# Patient Record
Sex: MALE | Race: WHITE | NOT HISPANIC OR LATINO | Employment: FULL TIME | URBAN - METROPOLITAN AREA
[De-identification: names, ages, dates, MRNs, and addresses within clinical notes are randomized per-mention and may not be internally consistent; named-entity substitution may affect disease eponyms.]

---

## 2017-12-22 LAB — CRC RECOMMENDATION EXT: NORMAL

## 2019-07-15 ENCOUNTER — OFFICE VISIT (OUTPATIENT)
Dept: URGENT CARE | Facility: CLINIC | Age: 54
End: 2019-07-15
Payer: COMMERCIAL

## 2019-07-15 VITALS
BODY MASS INDEX: 31.83 KG/M2 | WEIGHT: 235 LBS | OXYGEN SATURATION: 97 % | HEIGHT: 72 IN | DIASTOLIC BLOOD PRESSURE: 71 MMHG | HEART RATE: 69 BPM | RESPIRATION RATE: 20 BRPM | SYSTOLIC BLOOD PRESSURE: 119 MMHG | TEMPERATURE: 98 F

## 2019-07-15 DIAGNOSIS — R05.9 COUGH: ICD-10-CM

## 2019-07-15 DIAGNOSIS — R09.81 NASAL SINUS CONGESTION: Primary | ICD-10-CM

## 2019-07-15 PROCEDURE — 99213 OFFICE O/P EST LOW 20 MIN: CPT | Mod: S$GLB,,, | Performed by: NURSE PRACTITIONER

## 2019-07-15 PROCEDURE — 99213 PR OFFICE/OUTPT VISIT, EST, LEVL III, 20-29 MIN: ICD-10-PCS | Mod: S$GLB,,, | Performed by: NURSE PRACTITIONER

## 2019-07-15 RX ORDER — LEVOTHYROXINE SODIUM 200 UG/1
200 CAPSULE ORAL
COMMUNITY

## 2019-07-15 RX ORDER — FLUTICASONE PROPIONATE 50 MCG
1 SPRAY, SUSPENSION (ML) NASAL DAILY
Qty: 1 BOTTLE | Refills: 0 | Status: SHIPPED | OUTPATIENT
Start: 2019-07-15 | End: 2021-05-24

## 2019-07-15 NOTE — PATIENT INSTRUCTIONS
Return to Urgent Care or go to ER if symptoms worsen or fail to improve.  Follow up with PCP as recommended for further management.   Continue Advil cold and sinus medication    Use Nasal Saline to mechanically move any post nasal drip from your eustachian tube or from the back of your throat.    Use Afrin in each nare for no longer than 5 days, as it is addictive. It can also dry out your mucous membranes and cause elevated blood pressure.    Use Flonase 1-2 sprays/nostril per day. It is a local acting steroid nasal spray, if you develop a bloody nose, stop using the medication immediately.     Use warm salt water gargles to ease your throat pain. Warm salt water gargles as needed for sore throat-  1/2 tsp salt to 1 cup warm water, gargle as desired.    Sometimes Nyquil at night is beneficial to help you get some rest, however it is sedating and it does have an antihistamine, and tylenol.

## 2019-07-15 NOTE — PROGRESS NOTES
Subjective:       Patient ID: Walt Lopez is a 53 y.o. male.    Vitals:  height is 6' (1.829 m) and weight is 106.6 kg (235 lb). His oral temperature is 98.3 °F (36.8 °C). His blood pressure is 119/71 and his pulse is 69. His respiration is 20 and oxygen saturation is 97%.     Chief Complaint: Sinus Problem    Sinus Problem   This is a new problem. The current episode started 1 to 4 weeks ago (2 weeks). The problem has been gradually improving since onset. There has been no fever. His pain is at a severity of 0/10. He is experiencing no pain. Associated symptoms include congestion, coughing, headaches, sinus pressure and sneezing. Pertinent negatives include no chills, diaphoresis, ear pain, hoarse voice, neck pain, shortness of breath, sore throat or swollen glands. Past treatments include antibiotics and oral decongestants (Augmentin, NyQuil, Advil Cold & Sinus). The treatment provided mild relief.       Constitution: Positive for fatigue. Negative for chills, sweating and fever.   HENT: Positive for congestion, sinus pain and sinus pressure. Negative for ear pain, sore throat and voice change.    Neck: negative. Negative for neck pain and painful lymph nodes.   Cardiovascular: Negative.    Eyes: Negative for eye redness.   Respiratory: Positive for cough and sputum production. Negative for chest tightness, bloody sputum, COPD, shortness of breath, stridor, wheezing and asthma.    Gastrointestinal: Negative for nausea and vomiting.   Endocrine: negative.   Genitourinary: Negative.    Musculoskeletal: Negative for muscle ache.   Skin: Negative for rash.   Allergic/Immunologic: Positive for sneezing. Negative for seasonal allergies and asthma.   Neurological: Positive for headaches.   Hematologic/Lymphatic: Negative for swollen lymph nodes.   Psychiatric/Behavioral: Negative.        Objective:      Physical Exam   Constitutional: He is oriented to person, place, and time. He appears well-developed and  well-nourished. He is cooperative.  Non-toxic appearance. He does not appear ill. No distress.   HENT:   Head: Normocephalic and atraumatic.   Right Ear: Hearing, tympanic membrane, external ear and ear canal normal.   Left Ear: Hearing, tympanic membrane, external ear and ear canal normal.   Nose: Mucosal edema and rhinorrhea present. No nasal deformity. No epistaxis. Right sinus exhibits no maxillary sinus tenderness and no frontal sinus tenderness. Left sinus exhibits no maxillary sinus tenderness and no frontal sinus tenderness.   Mouth/Throat: Uvula is midline and mucous membranes are normal. No trismus in the jaw. Normal dentition. No uvula swelling. Posterior oropharyngeal erythema present. No oropharyngeal exudate.   Eyes: Conjunctivae and lids are normal. No scleral icterus.   Sclera clear bilat   Neck: Trachea normal, full passive range of motion without pain and phonation normal. Neck supple.   Cardiovascular: Normal rate, regular rhythm, normal heart sounds, intact distal pulses and normal pulses.   Pulmonary/Chest: Effort normal and breath sounds normal. No respiratory distress.   Abdominal: Soft. Normal appearance and bowel sounds are normal. He exhibits no distension. There is no tenderness.   Musculoskeletal: Normal range of motion. He exhibits no edema or deformity.   Lymphadenopathy:        Head (right side): No submental, no submandibular, no tonsillar, no preauricular and no posterior auricular adenopathy present.        Head (left side): No submental, no submandibular, no tonsillar, no preauricular and no posterior auricular adenopathy present.   Neurological: He is alert and oriented to person, place, and time. He exhibits normal muscle tone. Coordination normal.   Skin: Skin is warm, dry and intact. No rash noted. He is not diaphoretic. No pallor.   Psychiatric: He has a normal mood and affect. His speech is normal and behavior is normal. Judgment and thought content normal. Cognition and  memory are normal.   Nursing note and vitals reviewed.      Assessment:       1. Nasal sinus congestion    2. Cough        Plan:         Nasal sinus congestion    Cough    Other orders  -     sodium chloride (OCEAN NASAL) 0.65 % nasal spray; 1 spray by Nasal route as needed for Congestion.  Dispense: 60 mL; Refill: 1  -     fluticasone propionate (FLONASE) 50 mcg/actuation nasal spray; 1 spray (50 mcg total) by Each Nare route once daily.  Dispense: 1 Bottle; Refill: 0

## 2019-07-18 ENCOUNTER — TELEPHONE (OUTPATIENT)
Dept: URGENT CARE | Facility: CLINIC | Age: 54
End: 2019-07-18

## 2020-12-28 PROBLEM — E03.9 HYPOTHYROIDISM: Status: ACTIVE | Noted: 2020-12-28

## 2020-12-28 PROBLEM — I48.91 ATRIAL FIBRILLATION WITH TACHYCARDIC VENTRICULAR RATE: Status: ACTIVE | Noted: 2020-12-28

## 2020-12-29 PROBLEM — I48.3 TYPICAL ATRIAL FLUTTER: Status: ACTIVE | Noted: 2020-12-29

## 2020-12-29 PROBLEM — I48.91 ATRIAL FIBRILLATION WITH TACHYCARDIC VENTRICULAR RATE: Status: RESOLVED | Noted: 2020-12-28 | Resolved: 2020-12-29

## 2020-12-30 ENCOUNTER — PATIENT OUTREACH (OUTPATIENT)
Dept: ADMINISTRATIVE | Facility: CLINIC | Age: 55
End: 2020-12-30

## 2020-12-30 NOTE — PATIENT INSTRUCTIONS
Atrial Flutter    Atrial flutter means that your heart is beating very fast. It is caused by a problem in the electrical pathways of the heart. It can be a sign of heart disease or other health problems that affect your heart.  Palpitations are the most common symptom of atrial flutter. This is the feeling that your heart is fluttering or beating fast or hard. When your heart beats too fast, it doesnt pump blood very well. This can cause other symptoms, including:  · Anxiety  · Fatigue  · Shortness of breath  · Chest pain  · Dizziness  · Fainting  If this is the first time youve had atrial flutter, and you dont have heart or lung disease, it may never happen again. But in most cases, atrial flutter comes and goes. It can last from a few hours to a couple of days. Sometimes the atrial flutter doesnt ever go away. This is chronic atrial flutter.  Atrial flutter may be caused by heart disease. It may also be caused by other conditions that affect the heart:  · Coronary artery disease (arteriosclerosis)  · High blood pressure  · Disease of the heart valves  · Enlarged heart  Atrial flutter can occur without heart disease. This may be because of:  · Overactive thyroid (hyperthyroid)  · Chronic lung disease (COPD, emphysema, or bronchitis)  · Alcohol use  · Drugs or medicines that stimulate the heart. These include cocaine, amphetamines, diet pills, some decongestant cold medicines, caffeine, and nicotine.  · Infection  Treating or removing these causes will make it more likely that treatment for atrial flutter will work. It will also make it less likely that atrial flutter will come back.  Atrial flutter can happen along with another abnormal rhythm called atrial fibrillation. The risk for stroke is higher with these conditions. Getting treatment can reduce your risk.  Home care  Follow these guidelines when caring for yourself at home:  · Go back to your usual activities as soon as you feel back to normal.  · If  you smoke, stop smoking. Talk with your healthcare provider or call a local stop-smoking program for help.  · Dont take drugs or medicines that stimulate your heart. These include cocaine, amphetamines, diet pills, some decongestant cold medicines, caffeine, and nicotine.  · Your provider may have prescribed medicine to stop atrial fibrillation from coming back. Take this medicine exactly as directed. Some medicines must be taken every day to work as they should.  · Your provider may also have prescribed warfarin to lower your risk for stroke. Have your blood tested regularly as advised by your healthcare provider. This will help make sure the dose is right for you.  Follow-up care  Follow up with your healthcare provider, or as advised.  When should I call my healthcare provider?  Call your healthcare provider right away if any of these occur:  · Shortness of breath gets worse  · Swelling in either leg  · Unexpected weight gain  · Chest pain or pressure  · Near fainting or fainting  · You feel like your heart is fluttering, or beating fast or hard  · Fever of 100.4°F (38°C) or higher, or as directed by your healthcare provider  · Cough with dark-colored or bloody mucus  Also call your provider right away if you have signs of stroke:  · Weakness or numbness of an arm or leg or one side of the face  · Difficulty speaking or seeing  · Extreme drowsiness, confusion, dizziness, or fainting   Date Last Reviewed: 5/1/2016  © 0547-0480 [employer]. [address] [place] [id number]. All rights reserved. This information is not intended as a substitute for professional medical care. Always follow your healthcare professional's instructions.

## 2020-12-30 NOTE — PROGRESS NOTES
Please forward this important TCC information to your provider in order to maximize the post discharge care delivery of this patient.    C3 nurse spoke with Walt Lopez for a TCC post hospital discharge follow up call. The patient does not have a scheduled HOSFU appointment with Philip Kay MD within 7-14 days post hospital discharge date 12/29/2020. C3 nurse was unable to schedule HOSFU appointment in Psychiatric.  Please contact pcp and schedule follow up appointment using HOSFU visit type on or before 01/13/2021    Respectfully,  Doreen Ambrosio LPN    [facility]    [email or web address]       Please do not reply to this message, as this inbox is not routinely monitored.

## 2021-02-06 PROBLEM — I48.92 ATRIAL FLUTTER WITH RAPID VENTRICULAR RESPONSE: Status: ACTIVE | Noted: 2021-02-06

## 2021-03-19 ENCOUNTER — IMMUNIZATION (OUTPATIENT)
Dept: PRIMARY CARE CLINIC | Facility: CLINIC | Age: 56
End: 2021-03-19

## 2021-03-19 DIAGNOSIS — Z23 NEED FOR VACCINATION: Primary | ICD-10-CM

## 2021-04-09 ENCOUNTER — IMMUNIZATION (OUTPATIENT)
Dept: PRIMARY CARE CLINIC | Facility: CLINIC | Age: 56
End: 2021-04-09

## 2021-04-09 DIAGNOSIS — Z23 NEED FOR VACCINATION: Primary | ICD-10-CM

## 2021-05-24 ENCOUNTER — OFFICE VISIT (OUTPATIENT)
Dept: URGENT CARE | Facility: CLINIC | Age: 56
End: 2021-05-24
Payer: COMMERCIAL

## 2021-05-24 VITALS
TEMPERATURE: 97 F | DIASTOLIC BLOOD PRESSURE: 70 MMHG | RESPIRATION RATE: 20 BRPM | OXYGEN SATURATION: 95 % | SYSTOLIC BLOOD PRESSURE: 119 MMHG | BODY MASS INDEX: 34.13 KG/M2 | HEART RATE: 71 BPM | WEIGHT: 252 LBS | HEIGHT: 72 IN

## 2021-05-24 DIAGNOSIS — J06.9 UPPER RESPIRATORY INFECTION WITH COUGH AND CONGESTION: Primary | ICD-10-CM

## 2021-05-24 PROCEDURE — 99214 OFFICE O/P EST MOD 30 MIN: CPT | Mod: S$GLB,,, | Performed by: NURSE PRACTITIONER

## 2021-05-24 PROCEDURE — 99214 PR OFFICE/OUTPT VISIT, EST, LEVL IV, 30-39 MIN: ICD-10-PCS | Mod: S$GLB,,, | Performed by: NURSE PRACTITIONER

## 2021-05-24 RX ORDER — METOPROLOL SUCCINATE 50 MG/1
12.5 TABLET, EXTENDED RELEASE ORAL
COMMUNITY
End: 2021-07-01

## 2021-05-24 RX ORDER — AMOXICILLIN AND CLAVULANATE POTASSIUM 875; 125 MG/1; MG/1
1 TABLET, FILM COATED ORAL 2 TIMES DAILY
Qty: 14 TABLET | Refills: 0 | Status: SHIPPED | OUTPATIENT
Start: 2021-05-24 | End: 2021-05-31

## 2021-05-31 ENCOUNTER — OFFICE VISIT (OUTPATIENT)
Dept: URGENT CARE | Facility: CLINIC | Age: 56
End: 2021-05-31
Payer: COMMERCIAL

## 2021-05-31 VITALS
OXYGEN SATURATION: 98 % | TEMPERATURE: 98 F | DIASTOLIC BLOOD PRESSURE: 73 MMHG | HEART RATE: 66 BPM | WEIGHT: 250 LBS | SYSTOLIC BLOOD PRESSURE: 134 MMHG | BODY MASS INDEX: 33.86 KG/M2 | RESPIRATION RATE: 16 BRPM | HEIGHT: 72 IN

## 2021-05-31 DIAGNOSIS — R09.82 PND (POST-NASAL DRIP): Primary | ICD-10-CM

## 2021-05-31 PROCEDURE — 99214 PR OFFICE/OUTPT VISIT, EST, LEVL IV, 30-39 MIN: ICD-10-PCS | Mod: S$GLB,,, | Performed by: FAMILY MEDICINE

## 2021-05-31 PROCEDURE — 99214 OFFICE O/P EST MOD 30 MIN: CPT | Mod: S$GLB,,, | Performed by: FAMILY MEDICINE

## 2021-05-31 RX ORDER — BENZONATATE 200 MG/1
200 CAPSULE ORAL 3 TIMES DAILY PRN
Qty: 30 CAPSULE | Refills: 0 | Status: SHIPPED | OUTPATIENT
Start: 2021-05-31 | End: 2021-07-01

## 2021-05-31 RX ORDER — FLUTICASONE PROPIONATE 50 MCG
1 SPRAY, SUSPENSION (ML) NASAL 2 TIMES DAILY
Qty: 9.9 ML | Refills: 0 | Status: SHIPPED | OUTPATIENT
Start: 2021-05-31 | End: 2021-07-01

## 2021-06-01 ENCOUNTER — OFFICE VISIT (OUTPATIENT)
Dept: OTOLARYNGOLOGY | Facility: CLINIC | Age: 56
End: 2021-06-01
Payer: COMMERCIAL

## 2021-06-01 VITALS
WEIGHT: 258.5 LBS | BODY MASS INDEX: 35.01 KG/M2 | HEIGHT: 72 IN | SYSTOLIC BLOOD PRESSURE: 126 MMHG | DIASTOLIC BLOOD PRESSURE: 78 MMHG | TEMPERATURE: 98 F

## 2021-06-01 DIAGNOSIS — R09.82 PND (POST-NASAL DRIP): ICD-10-CM

## 2021-06-01 DIAGNOSIS — J34.2 NASAL SEPTAL DEVIATION: ICD-10-CM

## 2021-06-01 DIAGNOSIS — J31.0 RHINITIS, UNSPECIFIED TYPE: ICD-10-CM

## 2021-06-01 DIAGNOSIS — J34.89 NASAL HYPERTROPHY: Primary | ICD-10-CM

## 2021-06-01 DIAGNOSIS — J34.89 SINUS PRESSURE: ICD-10-CM

## 2021-06-01 DIAGNOSIS — R09.A2 GLOBUS SENSATION: ICD-10-CM

## 2021-06-01 PROCEDURE — 99203 OFFICE O/P NEW LOW 30 MIN: CPT | Mod: 25,S$GLB,, | Performed by: NURSE PRACTITIONER

## 2021-06-01 PROCEDURE — 31575 DIAGNOSTIC LARYNGOSCOPY: CPT | Mod: S$GLB,,, | Performed by: NURSE PRACTITIONER

## 2021-06-01 PROCEDURE — 31575 LARYNGOSCOPY: ICD-10-PCS | Mod: S$GLB,,, | Performed by: NURSE PRACTITIONER

## 2021-06-01 PROCEDURE — 99203 PR OFFICE/OUTPT VISIT, NEW, LEVL III, 30-44 MIN: ICD-10-PCS | Mod: 25,S$GLB,, | Performed by: NURSE PRACTITIONER

## 2021-06-01 RX ORDER — METHYLPREDNISOLONE 4 MG/1
TABLET ORAL
Qty: 1 PACKAGE | Refills: 0 | Status: SHIPPED | OUTPATIENT
Start: 2021-06-01 | End: 2021-06-22

## 2021-06-11 ENCOUNTER — PATIENT MESSAGE (OUTPATIENT)
Dept: OTOLARYNGOLOGY | Facility: CLINIC | Age: 56
End: 2021-06-11

## 2021-06-11 DIAGNOSIS — J01.90 ACUTE SINUSITIS, RECURRENCE NOT SPECIFIED, UNSPECIFIED LOCATION: Primary | ICD-10-CM

## 2021-06-11 RX ORDER — DOXYCYCLINE HYCLATE 100 MG
100 TABLET ORAL 2 TIMES DAILY
Qty: 20 TABLET | Refills: 0 | Status: SHIPPED | OUTPATIENT
Start: 2021-06-11 | End: 2021-06-21

## 2021-06-14 ENCOUNTER — OFFICE VISIT (OUTPATIENT)
Dept: OTOLARYNGOLOGY | Facility: CLINIC | Age: 56
End: 2021-06-14
Payer: COMMERCIAL

## 2021-06-14 VITALS — SYSTOLIC BLOOD PRESSURE: 135 MMHG | DIASTOLIC BLOOD PRESSURE: 89 MMHG | HEART RATE: 73 BPM

## 2021-06-14 DIAGNOSIS — R68.2 DRY MOUTH: ICD-10-CM

## 2021-06-14 DIAGNOSIS — R09.A2 GLOBUS SENSATION: ICD-10-CM

## 2021-06-14 DIAGNOSIS — R13.10 DYSPHAGIA, UNSPECIFIED TYPE: Primary | ICD-10-CM

## 2021-06-14 DIAGNOSIS — J34.89 SINUS PRESSURE: ICD-10-CM

## 2021-06-14 PROCEDURE — 99203 PR OFFICE/OUTPT VISIT, NEW, LEVL III, 30-44 MIN: ICD-10-PCS | Mod: S$GLB,,, | Performed by: NURSE PRACTITIONER

## 2021-06-14 PROCEDURE — 99999 PR PBB SHADOW E&M-EST. PATIENT-LVL III: ICD-10-PCS | Mod: PBBFAC,,, | Performed by: NURSE PRACTITIONER

## 2021-06-14 PROCEDURE — 99999 PR PBB SHADOW E&M-EST. PATIENT-LVL III: CPT | Mod: PBBFAC,,, | Performed by: NURSE PRACTITIONER

## 2021-06-14 PROCEDURE — 99203 OFFICE O/P NEW LOW 30 MIN: CPT | Mod: S$GLB,,, | Performed by: NURSE PRACTITIONER

## 2021-06-25 ENCOUNTER — PATIENT MESSAGE (OUTPATIENT)
Dept: OTOLARYNGOLOGY | Facility: CLINIC | Age: 56
End: 2021-06-25

## 2021-07-01 ENCOUNTER — OFFICE VISIT (OUTPATIENT)
Dept: OTOLARYNGOLOGY | Facility: CLINIC | Age: 56
End: 2021-07-01
Payer: COMMERCIAL

## 2021-07-01 VITALS — WEIGHT: 257.5 LBS | BODY MASS INDEX: 34.92 KG/M2

## 2021-07-01 DIAGNOSIS — J34.2 DEVIATED NASAL SEPTUM: ICD-10-CM

## 2021-07-01 DIAGNOSIS — H61.23 IMPACTED CERUMEN OF BOTH EARS: ICD-10-CM

## 2021-07-01 DIAGNOSIS — J34.3 NASAL TURBINATE HYPERTROPHY: ICD-10-CM

## 2021-07-01 DIAGNOSIS — J34.89 NASAL CAVITY MASS: Primary | ICD-10-CM

## 2021-07-01 PROCEDURE — 31231 NASAL ENDOSCOPY DX: CPT | Mod: S$GLB,,, | Performed by: OTOLARYNGOLOGY

## 2021-07-01 PROCEDURE — 99215 PR OFFICE/OUTPT VISIT, EST, LEVL V, 40-54 MIN: ICD-10-PCS | Mod: 25,S$GLB,, | Performed by: OTOLARYNGOLOGY

## 2021-07-01 PROCEDURE — 31231 NASAL/SINUS ENDOSCOPY: ICD-10-PCS | Mod: S$GLB,,, | Performed by: OTOLARYNGOLOGY

## 2021-07-01 PROCEDURE — 99215 OFFICE O/P EST HI 40 MIN: CPT | Mod: 25,S$GLB,, | Performed by: OTOLARYNGOLOGY

## 2021-07-01 PROCEDURE — 99999 PR PBB SHADOW E&M-EST. PATIENT-LVL III: ICD-10-PCS | Mod: PBBFAC,,, | Performed by: OTOLARYNGOLOGY

## 2021-07-01 PROCEDURE — 99999 PR PBB SHADOW E&M-EST. PATIENT-LVL III: CPT | Mod: PBBFAC,,, | Performed by: OTOLARYNGOLOGY

## 2021-07-02 ENCOUNTER — PATIENT MESSAGE (OUTPATIENT)
Dept: OTOLARYNGOLOGY | Facility: CLINIC | Age: 56
End: 2021-07-02

## 2021-07-06 ENCOUNTER — HOSPITAL ENCOUNTER (OUTPATIENT)
Dept: RADIOLOGY | Facility: HOSPITAL | Age: 56
Discharge: HOME | End: 2021-07-06
Attending: PHYSICIAN ASSISTANT
Payer: COMMERCIAL

## 2021-07-06 DIAGNOSIS — J34.2 DEVIATED NASAL SEPTUM: ICD-10-CM

## 2021-07-06 DIAGNOSIS — J34.3 NASAL TURBINATE HYPERTROPHY: ICD-10-CM

## 2021-07-06 PROCEDURE — 70486 CT MEDTRONIC SINUSES WITHOUT: ICD-10-PCS | Mod: 26,,, | Performed by: RADIOLOGY

## 2021-07-06 PROCEDURE — 70486 CT MAXILLOFACIAL W/O DYE: CPT | Mod: TC

## 2021-07-06 PROCEDURE — 70486 CT MAXILLOFACIAL W/O DYE: CPT | Mod: 26,,, | Performed by: RADIOLOGY

## 2021-07-07 ENCOUNTER — PATIENT MESSAGE (OUTPATIENT)
Dept: OTOLARYNGOLOGY | Facility: CLINIC | Age: 56
End: 2021-07-07

## 2021-07-07 ENCOUNTER — OFFICE VISIT (OUTPATIENT)
Dept: OTOLARYNGOLOGY | Facility: CLINIC | Age: 56
End: 2021-07-07
Payer: COMMERCIAL

## 2021-07-07 VITALS — BODY MASS INDEX: 34.92 KG/M2 | WEIGHT: 257.5 LBS

## 2021-07-07 DIAGNOSIS — J34.3 NASAL TURBINATE HYPERTROPHY: ICD-10-CM

## 2021-07-07 DIAGNOSIS — J34.89 NASAL CAVITY MASS: Primary | ICD-10-CM

## 2021-07-07 DIAGNOSIS — J34.2 DEVIATED NASAL SEPTUM: ICD-10-CM

## 2021-07-07 PROCEDURE — 99999 PR PBB SHADOW E&M-EST. PATIENT-LVL III: CPT | Mod: PBBFAC,,, | Performed by: OTOLARYNGOLOGY

## 2021-07-07 PROCEDURE — 99999 PR PBB SHADOW E&M-EST. PATIENT-LVL III: ICD-10-PCS | Mod: PBBFAC,,, | Performed by: OTOLARYNGOLOGY

## 2021-07-07 PROCEDURE — 99214 OFFICE O/P EST MOD 30 MIN: CPT | Mod: S$GLB,,, | Performed by: OTOLARYNGOLOGY

## 2021-07-07 PROCEDURE — 99214 PR OFFICE/OUTPT VISIT, EST, LEVL IV, 30-39 MIN: ICD-10-PCS | Mod: S$GLB,,, | Performed by: OTOLARYNGOLOGY

## 2021-07-12 DIAGNOSIS — J34.3 NASAL TURBINATE HYPERTROPHY: ICD-10-CM

## 2021-07-12 DIAGNOSIS — J34.89 SINUS PRESSURE: ICD-10-CM

## 2021-07-12 DIAGNOSIS — J34.89 NASAL CAVITY MASS: Primary | ICD-10-CM

## 2021-07-12 DIAGNOSIS — J01.90 ACUTE SINUSITIS, RECURRENCE NOT SPECIFIED, UNSPECIFIED LOCATION: ICD-10-CM

## 2021-07-12 DIAGNOSIS — J34.2 DEVIATED NASAL SEPTUM: ICD-10-CM

## 2021-07-22 ENCOUNTER — PATIENT MESSAGE (OUTPATIENT)
Dept: OTOLARYNGOLOGY | Facility: CLINIC | Age: 56
End: 2021-07-22

## 2021-07-28 ENCOUNTER — ANESTHESIA EVENT (OUTPATIENT)
Dept: SURGERY | Facility: HOSPITAL | Age: 56
End: 2021-07-28
Payer: COMMERCIAL

## 2021-07-28 ENCOUNTER — TELEPHONE (OUTPATIENT)
Dept: OTOLARYNGOLOGY | Facility: CLINIC | Age: 56
End: 2021-07-28

## 2021-07-28 RX ORDER — ASPIRIN 81 MG/1
81 TABLET ORAL DAILY
COMMUNITY
End: 2021-12-23

## 2021-07-29 ENCOUNTER — HOSPITAL ENCOUNTER (OUTPATIENT)
Facility: HOSPITAL | Age: 56
Discharge: HOME | End: 2021-07-29
Attending: OTOLARYNGOLOGY | Admitting: OTOLARYNGOLOGY
Payer: COMMERCIAL

## 2021-07-29 ENCOUNTER — ANESTHESIA (OUTPATIENT)
Dept: SURGERY | Facility: HOSPITAL | Age: 56
End: 2021-07-29
Payer: COMMERCIAL

## 2021-07-29 VITALS
DIASTOLIC BLOOD PRESSURE: 82 MMHG | TEMPERATURE: 98 F | HEART RATE: 77 BPM | RESPIRATION RATE: 18 BRPM | OXYGEN SATURATION: 96 % | BODY MASS INDEX: 33.18 KG/M2 | SYSTOLIC BLOOD PRESSURE: 148 MMHG | HEIGHT: 72 IN | WEIGHT: 245 LBS

## 2021-07-29 DIAGNOSIS — J34.89 NASAL MASS: Primary | ICD-10-CM

## 2021-07-29 DIAGNOSIS — J34.3 NASAL TURBINATE HYPERTROPHY: ICD-10-CM

## 2021-07-29 DIAGNOSIS — J34.2 DEVIATED NASAL SEPTUM: ICD-10-CM

## 2021-07-29 PROCEDURE — 37000008 HC ANESTHESIA 1ST 15 MINUTES: Performed by: OTOLARYNGOLOGY

## 2021-07-29 PROCEDURE — D9220A PRA ANESTHESIA: ICD-10-PCS | Mod: ANES,,, | Performed by: ANESTHESIOLOGY

## 2021-07-29 PROCEDURE — 88307 TISSUE EXAM BY PATHOLOGIST: CPT | Performed by: PATHOLOGY

## 2021-07-29 PROCEDURE — 27201423 OPTIME MED/SURG SUP & DEVICES STERILE SUPPLY: Performed by: OTOLARYNGOLOGY

## 2021-07-29 PROCEDURE — 36000707: Performed by: OTOLARYNGOLOGY

## 2021-07-29 PROCEDURE — 63600175 PHARM REV CODE 636 W HCPCS: Performed by: NURSE ANESTHETIST, CERTIFIED REGISTERED

## 2021-07-29 PROCEDURE — 25000003 PHARM REV CODE 250: Performed by: OTOLARYNGOLOGY

## 2021-07-29 PROCEDURE — 30117 REMOVAL OF INTRANASAL LESION: CPT | Mod: 59,,, | Performed by: OTOLARYNGOLOGY

## 2021-07-29 PROCEDURE — 30140 RESECT INFERIOR TURBINATE: CPT | Mod: 50,51,, | Performed by: OTOLARYNGOLOGY

## 2021-07-29 PROCEDURE — 30140 PR EXCISION TURBINATE,SUBMUCOUS: ICD-10-PCS | Mod: 50,51,, | Performed by: OTOLARYNGOLOGY

## 2021-07-29 PROCEDURE — 25000003 PHARM REV CODE 250: Performed by: STUDENT IN AN ORGANIZED HEALTH CARE EDUCATION/TRAINING PROGRAM

## 2021-07-29 PROCEDURE — 88307 TISSUE EXAM BY PATHOLOGIST: CPT | Mod: 26,,, | Performed by: PATHOLOGY

## 2021-07-29 PROCEDURE — D9220A PRA ANESTHESIA: ICD-10-PCS | Mod: CRNA,,, | Performed by: NURSE ANESTHETIST, CERTIFIED REGISTERED

## 2021-07-29 PROCEDURE — 25000003 PHARM REV CODE 250: Performed by: NURSE ANESTHETIST, CERTIFIED REGISTERED

## 2021-07-29 PROCEDURE — 94761 N-INVAS EAR/PLS OXIMETRY MLT: CPT

## 2021-07-29 PROCEDURE — 30117 PR EXCIS/DEST INTRANAS LESION; INT APP: ICD-10-PCS | Mod: 59,,, | Performed by: OTOLARYNGOLOGY

## 2021-07-29 PROCEDURE — 30520 PR REPAIR, NASAL SEPTUM: ICD-10-PCS | Mod: ,,, | Performed by: OTOLARYNGOLOGY

## 2021-07-29 PROCEDURE — D9220A PRA ANESTHESIA: Mod: ANES,,, | Performed by: ANESTHESIOLOGY

## 2021-07-29 PROCEDURE — 88307 PR  SURG PATH,LEVEL V: ICD-10-PCS | Mod: 26,,, | Performed by: PATHOLOGY

## 2021-07-29 PROCEDURE — 63600175 PHARM REV CODE 636 W HCPCS: Performed by: STUDENT IN AN ORGANIZED HEALTH CARE EDUCATION/TRAINING PROGRAM

## 2021-07-29 PROCEDURE — 63600175 PHARM REV CODE 636 W HCPCS: Performed by: OTOLARYNGOLOGY

## 2021-07-29 PROCEDURE — 71000033 HC RECOVERY, INTIAL HOUR: Performed by: OTOLARYNGOLOGY

## 2021-07-29 PROCEDURE — D9220A PRA ANESTHESIA: Mod: CRNA,,, | Performed by: NURSE ANESTHETIST, CERTIFIED REGISTERED

## 2021-07-29 PROCEDURE — 36000706: Performed by: OTOLARYNGOLOGY

## 2021-07-29 PROCEDURE — 37000009 HC ANESTHESIA EA ADD 15 MINS: Performed by: OTOLARYNGOLOGY

## 2021-07-29 PROCEDURE — 30520 REPAIR OF NASAL SEPTUM: CPT | Mod: ,,, | Performed by: OTOLARYNGOLOGY

## 2021-07-29 RX ORDER — CEFAZOLIN SODIUM 1 G/3ML
2 INJECTION, POWDER, FOR SOLUTION INTRAMUSCULAR; INTRAVENOUS
Status: COMPLETED | OUTPATIENT
Start: 2021-07-29 | End: 2021-07-29

## 2021-07-29 RX ORDER — ACETAMINOPHEN 500 MG
1000 TABLET ORAL EVERY 6 HOURS PRN
Qty: 30 TABLET | Refills: 1 | Status: SHIPPED | OUTPATIENT
Start: 2021-07-29 | End: 2021-08-10

## 2021-07-29 RX ORDER — FENTANYL CITRATE 50 UG/ML
INJECTION, SOLUTION INTRAMUSCULAR; INTRAVENOUS
Status: DISCONTINUED | OUTPATIENT
Start: 2021-07-29 | End: 2021-07-29

## 2021-07-29 RX ORDER — MIDAZOLAM HYDROCHLORIDE 1 MG/ML
INJECTION, SOLUTION INTRAMUSCULAR; INTRAVENOUS
Status: DISCONTINUED | OUTPATIENT
Start: 2021-07-29 | End: 2021-07-29

## 2021-07-29 RX ORDER — LIDOCAINE HYDROCHLORIDE 10 MG/ML
1 INJECTION, SOLUTION EPIDURAL; INFILTRATION; INTRACAUDAL; PERINEURAL ONCE
Status: COMPLETED | OUTPATIENT
Start: 2021-07-29 | End: 2021-07-29

## 2021-07-29 RX ORDER — LIDOCAINE HYDROCHLORIDE AND EPINEPHRINE 10; 10 MG/ML; UG/ML
INJECTION, SOLUTION INFILTRATION; PERINEURAL
Status: DISCONTINUED | OUTPATIENT
Start: 2021-07-29 | End: 2021-07-29 | Stop reason: HOSPADM

## 2021-07-29 RX ORDER — ONDANSETRON 4 MG/1
8 TABLET, ORALLY DISINTEGRATING ORAL EVERY 8 HOURS PRN
Qty: 10 TABLET | Refills: 0 | Status: SHIPPED | OUTPATIENT
Start: 2021-07-29 | End: 2021-08-10

## 2021-07-29 RX ORDER — ACETAMINOPHEN 10 MG/ML
INJECTION, SOLUTION INTRAVENOUS
Status: DISCONTINUED | OUTPATIENT
Start: 2021-07-29 | End: 2021-07-29

## 2021-07-29 RX ORDER — SODIUM CHLORIDE 0.9 % (FLUSH) 0.9 %
3 SYRINGE (ML) INJECTION
Status: DISCONTINUED | OUTPATIENT
Start: 2021-07-29 | End: 2021-07-29 | Stop reason: HOSPADM

## 2021-07-29 RX ORDER — SODIUM CHLORIDE 0.9 % (FLUSH) 0.9 %
10 SYRINGE (ML) INJECTION
Status: DISCONTINUED | OUTPATIENT
Start: 2021-07-29 | End: 2021-07-29 | Stop reason: HOSPADM

## 2021-07-29 RX ORDER — PROPOFOL 10 MG/ML
VIAL (ML) INTRAVENOUS
Status: DISCONTINUED | OUTPATIENT
Start: 2021-07-29 | End: 2021-07-29

## 2021-07-29 RX ORDER — AMOXICILLIN 875 MG/1
875 TABLET, FILM COATED ORAL EVERY 12 HOURS
Qty: 14 TABLET | Refills: 0 | Status: SHIPPED | OUTPATIENT
Start: 2021-07-29 | End: 2021-08-05

## 2021-07-29 RX ORDER — IBUPROFEN 600 MG/1
600 TABLET ORAL EVERY 6 HOURS PRN
Qty: 30 TABLET | Refills: 1 | Status: SHIPPED | OUTPATIENT
Start: 2021-07-29 | End: 2021-08-10

## 2021-07-29 RX ORDER — DEXAMETHASONE SODIUM PHOSPHATE 4 MG/ML
INJECTION, SOLUTION INTRA-ARTICULAR; INTRALESIONAL; INTRAMUSCULAR; INTRAVENOUS; SOFT TISSUE
Status: DISCONTINUED | OUTPATIENT
Start: 2021-07-29 | End: 2021-07-29

## 2021-07-29 RX ORDER — LIDOCAINE HYDROCHLORIDE 20 MG/ML
INJECTION, SOLUTION EPIDURAL; INFILTRATION; INTRACAUDAL; PERINEURAL
Status: DISCONTINUED | OUTPATIENT
Start: 2021-07-29 | End: 2021-07-29

## 2021-07-29 RX ORDER — REMIFENTANIL HYDROCHLORIDE 1 MG/ML
INJECTION, POWDER, LYOPHILIZED, FOR SOLUTION INTRAVENOUS CONTINUOUS PRN
Status: DISCONTINUED | OUTPATIENT
Start: 2021-07-29 | End: 2021-07-29

## 2021-07-29 RX ORDER — ROCURONIUM BROMIDE 10 MG/ML
INJECTION, SOLUTION INTRAVENOUS
Status: DISCONTINUED | OUTPATIENT
Start: 2021-07-29 | End: 2021-07-29

## 2021-07-29 RX ORDER — EPINEPHRINE CONVENIENCE KIT 1 MG/ML(1)
KIT INTRAMUSCULAR; SUBCUTANEOUS
Status: DISCONTINUED | OUTPATIENT
Start: 2021-07-29 | End: 2021-07-29 | Stop reason: HOSPADM

## 2021-07-29 RX ORDER — ONDANSETRON 2 MG/ML
INJECTION INTRAMUSCULAR; INTRAVENOUS
Status: DISCONTINUED | OUTPATIENT
Start: 2021-07-29 | End: 2021-07-29

## 2021-07-29 RX ADMIN — FENTANYL CITRATE 100 MCG: 50 INJECTION INTRAMUSCULAR; INTRAVENOUS at 04:07

## 2021-07-29 RX ADMIN — PROPOFOL 200 MG: 10 INJECTION, EMULSION INTRAVENOUS at 04:07

## 2021-07-29 RX ADMIN — ONDANSETRON 4 MG: 2 INJECTION INTRAMUSCULAR; INTRAVENOUS at 06:07

## 2021-07-29 RX ADMIN — CEFAZOLIN 2 G: 330 INJECTION, POWDER, FOR SOLUTION INTRAMUSCULAR; INTRAVENOUS at 04:07

## 2021-07-29 RX ADMIN — REMIFENTANIL HYDROCHLORIDE 0.1 MCG/KG/MIN: 1 INJECTION, POWDER, LYOPHILIZED, FOR SOLUTION INTRAVENOUS at 04:07

## 2021-07-29 RX ADMIN — PROPOFOL 150 MCG/KG/MIN: 10 INJECTION, EMULSION INTRAVENOUS at 04:07

## 2021-07-29 RX ADMIN — DEXAMETHASONE SODIUM PHOSPHATE 4 MG: 4 INJECTION INTRA-ARTICULAR; INTRALESIONAL; INTRAMUSCULAR; INTRAVENOUS; SOFT TISSUE at 04:07

## 2021-07-29 RX ADMIN — LIDOCAINE HYDROCHLORIDE 100 MG: 20 INJECTION, SOLUTION EPIDURAL; INFILTRATION; INTRACAUDAL at 04:07

## 2021-07-29 RX ADMIN — MIDAZOLAM 2 MG: 1 INJECTION INTRAMUSCULAR; INTRAVENOUS at 04:07

## 2021-07-29 RX ADMIN — DEXAMETHASONE SODIUM PHOSPHATE 100 MG: 4 INJECTION INTRA-ARTICULAR; INTRALESIONAL; INTRAMUSCULAR; INTRAVENOUS; SOFT TISSUE at 04:07

## 2021-07-29 RX ADMIN — ACETAMINOPHEN 1000 MG: 10 INJECTION INTRAVENOUS at 05:07

## 2021-07-29 RX ADMIN — SODIUM CHLORIDE: 0.9 INJECTION, SOLUTION INTRAVENOUS at 03:07

## 2021-07-29 RX ADMIN — ROCURONIUM BROMIDE 30 MG: 10 INJECTION INTRAVENOUS at 04:07

## 2021-07-30 ENCOUNTER — PATIENT MESSAGE (OUTPATIENT)
Dept: OTOLARYNGOLOGY | Facility: CLINIC | Age: 56
End: 2021-07-30

## 2021-08-03 LAB
FINAL PATHOLOGIC DIAGNOSIS: NORMAL
GROSS: NORMAL
Lab: NORMAL

## 2021-08-10 ENCOUNTER — OFFICE VISIT (OUTPATIENT)
Dept: OTOLARYNGOLOGY | Facility: CLINIC | Age: 56
End: 2021-08-10
Payer: COMMERCIAL

## 2021-08-10 VITALS — BODY MASS INDEX: 34.04 KG/M2 | HEIGHT: 72 IN | WEIGHT: 251.31 LBS

## 2021-08-10 DIAGNOSIS — J34.3 NASAL TURBINATE HYPERTROPHY: ICD-10-CM

## 2021-08-10 DIAGNOSIS — J34.2 DEVIATED NASAL SEPTUM: Primary | ICD-10-CM

## 2021-08-10 DIAGNOSIS — D23.39 PAPILLOMA OF NOSE: ICD-10-CM

## 2021-08-10 PROCEDURE — 99999 PR PBB SHADOW E&M-EST. PATIENT-LVL III: CPT | Mod: PBBFAC,,, | Performed by: OTOLARYNGOLOGY

## 2021-08-10 PROCEDURE — 99024 POSTOP FOLLOW-UP VISIT: CPT | Mod: S$GLB,,, | Performed by: OTOLARYNGOLOGY

## 2021-08-10 PROCEDURE — 99024 PR POST-OP FOLLOW-UP VISIT: ICD-10-PCS | Mod: S$GLB,,, | Performed by: OTOLARYNGOLOGY

## 2021-08-10 PROCEDURE — 99999 PR PBB SHADOW E&M-EST. PATIENT-LVL III: ICD-10-PCS | Mod: PBBFAC,,, | Performed by: OTOLARYNGOLOGY

## 2021-08-10 PROCEDURE — 31231 NASAL ENDOSCOPY DX: CPT | Mod: 79,S$GLB,, | Performed by: OTOLARYNGOLOGY

## 2021-08-10 PROCEDURE — 31231 NASAL/SINUS ENDOSCOPY: ICD-10-PCS | Mod: 79,S$GLB,, | Performed by: OTOLARYNGOLOGY

## 2021-09-15 ENCOUNTER — OFFICE VISIT (OUTPATIENT)
Dept: OTOLARYNGOLOGY | Facility: CLINIC | Age: 56
End: 2021-09-15
Payer: COMMERCIAL

## 2021-09-15 VITALS — WEIGHT: 248.25 LBS | HEIGHT: 72 IN | BODY MASS INDEX: 33.62 KG/M2

## 2021-09-15 DIAGNOSIS — J34.2 DEVIATED NASAL SEPTUM: ICD-10-CM

## 2021-09-15 DIAGNOSIS — D23.39 PAPILLOMA OF NOSE: Primary | ICD-10-CM

## 2021-09-15 DIAGNOSIS — J34.3 NASAL TURBINATE HYPERTROPHY: ICD-10-CM

## 2021-09-15 PROCEDURE — 99999 PR PBB SHADOW E&M-EST. PATIENT-LVL III: CPT | Mod: PBBFAC,,, | Performed by: OTOLARYNGOLOGY

## 2021-09-15 PROCEDURE — 31231 NASAL ENDOSCOPY DX: CPT | Mod: 79,S$GLB,, | Performed by: OTOLARYNGOLOGY

## 2021-09-15 PROCEDURE — 99024 POSTOP FOLLOW-UP VISIT: CPT | Mod: S$GLB,,, | Performed by: OTOLARYNGOLOGY

## 2021-09-15 PROCEDURE — 31231 NASAL/SINUS ENDOSCOPY: ICD-10-PCS | Mod: 79,S$GLB,, | Performed by: OTOLARYNGOLOGY

## 2021-09-15 PROCEDURE — 99024 PR POST-OP FOLLOW-UP VISIT: ICD-10-PCS | Mod: S$GLB,,, | Performed by: OTOLARYNGOLOGY

## 2021-09-15 PROCEDURE — 99999 PR PBB SHADOW E&M-EST. PATIENT-LVL III: ICD-10-PCS | Mod: PBBFAC,,, | Performed by: OTOLARYNGOLOGY

## 2021-10-18 ENCOUNTER — PATIENT MESSAGE (OUTPATIENT)
Dept: GASTROENTEROLOGY | Facility: CLINIC | Age: 56
End: 2021-10-18

## 2021-10-18 ENCOUNTER — OFFICE VISIT (OUTPATIENT)
Dept: GASTROENTEROLOGY | Facility: CLINIC | Age: 56
End: 2021-10-18
Payer: COMMERCIAL

## 2021-10-18 VITALS
WEIGHT: 249.81 LBS | RESPIRATION RATE: 18 BRPM | HEART RATE: 80 BPM | DIASTOLIC BLOOD PRESSURE: 78 MMHG | HEIGHT: 72 IN | BODY MASS INDEX: 33.83 KG/M2 | OXYGEN SATURATION: 98 % | SYSTOLIC BLOOD PRESSURE: 120 MMHG

## 2021-10-18 DIAGNOSIS — R09.A2 GLOBUS SENSATION: ICD-10-CM

## 2021-10-18 DIAGNOSIS — R13.19 ESOPHAGEAL DYSPHAGIA: Primary | ICD-10-CM

## 2021-10-18 PROBLEM — I48.92 ATRIAL FLUTTER WITH RAPID VENTRICULAR RESPONSE: Status: RESOLVED | Noted: 2021-02-06 | Resolved: 2021-10-18

## 2021-10-18 PROBLEM — I48.3 TYPICAL ATRIAL FLUTTER: Status: RESOLVED | Noted: 2020-12-29 | Resolved: 2021-10-18

## 2021-10-18 PROBLEM — Z98.890 S/P ABLATION OF ATRIAL FLUTTER: Status: ACTIVE | Noted: 2021-10-18

## 2021-10-18 PROBLEM — Z86.79 S/P ABLATION OF ATRIAL FLUTTER: Status: ACTIVE | Noted: 2021-10-18

## 2021-10-18 PROCEDURE — 99999 PR PBB SHADOW E&M-EST. PATIENT-LVL V: CPT | Mod: PBBFAC,,, | Performed by: NURSE PRACTITIONER

## 2021-10-18 PROCEDURE — 99203 PR OFFICE/OUTPT VISIT, NEW, LEVL III, 30-44 MIN: ICD-10-PCS | Mod: S$GLB,,, | Performed by: NURSE PRACTITIONER

## 2021-10-18 PROCEDURE — 99999 PR PBB SHADOW E&M-EST. PATIENT-LVL V: ICD-10-PCS | Mod: PBBFAC,,, | Performed by: NURSE PRACTITIONER

## 2021-10-18 PROCEDURE — 99203 OFFICE O/P NEW LOW 30 MIN: CPT | Mod: S$GLB,,, | Performed by: NURSE PRACTITIONER

## 2021-10-28 ENCOUNTER — TELEPHONE (OUTPATIENT)
Dept: OTOLARYNGOLOGY | Facility: CLINIC | Age: 56
End: 2021-10-28
Payer: COMMERCIAL

## 2021-11-04 ENCOUNTER — TELEPHONE (OUTPATIENT)
Dept: GASTROENTEROLOGY | Facility: CLINIC | Age: 56
End: 2021-11-04
Payer: COMMERCIAL

## 2021-11-09 ENCOUNTER — OFFICE VISIT (OUTPATIENT)
Dept: FAMILY MEDICINE | Facility: CLINIC | Age: 56
End: 2021-11-09
Payer: COMMERCIAL

## 2021-11-09 VITALS
OXYGEN SATURATION: 98 % | DIASTOLIC BLOOD PRESSURE: 58 MMHG | HEART RATE: 85 BPM | WEIGHT: 250.13 LBS | BODY MASS INDEX: 33.88 KG/M2 | HEIGHT: 72 IN | SYSTOLIC BLOOD PRESSURE: 110 MMHG

## 2021-11-09 DIAGNOSIS — Z12.5 PROSTATE CANCER SCREENING: ICD-10-CM

## 2021-11-09 DIAGNOSIS — Z11.4 ENCOUNTER FOR SCREENING FOR HIV: ICD-10-CM

## 2021-11-09 DIAGNOSIS — Z00.00 GENERAL MEDICAL EXAM: ICD-10-CM

## 2021-11-09 DIAGNOSIS — Z11.59 ENCOUNTER FOR HEPATITIS C SCREENING TEST FOR LOW RISK PATIENT: Primary | ICD-10-CM

## 2021-11-09 PROCEDURE — 90686 FLU VACCINE (QUAD) GREATER THAN OR EQUAL TO 3YO PRESERVATIVE FREE IM: ICD-10-PCS | Mod: S$GLB,,, | Performed by: FAMILY MEDICINE

## 2021-11-09 PROCEDURE — 99999 PR PBB SHADOW E&M-EST. PATIENT-LVL III: CPT | Mod: PBBFAC,,, | Performed by: FAMILY MEDICINE

## 2021-11-09 PROCEDURE — 99396 PR PREVENTIVE VISIT,EST,40-64: ICD-10-PCS | Mod: 25,S$GLB,, | Performed by: FAMILY MEDICINE

## 2021-11-09 PROCEDURE — 90471 IMMUNIZATION ADMIN: CPT | Mod: S$GLB,,, | Performed by: FAMILY MEDICINE

## 2021-11-09 PROCEDURE — 90686 IIV4 VACC NO PRSV 0.5 ML IM: CPT | Mod: S$GLB,,, | Performed by: FAMILY MEDICINE

## 2021-11-09 PROCEDURE — 90471 FLU VACCINE (QUAD) GREATER THAN OR EQUAL TO 3YO PRESERVATIVE FREE IM: ICD-10-PCS | Mod: S$GLB,,, | Performed by: FAMILY MEDICINE

## 2021-11-09 PROCEDURE — 99396 PREV VISIT EST AGE 40-64: CPT | Mod: 25,S$GLB,, | Performed by: FAMILY MEDICINE

## 2021-11-09 PROCEDURE — 99999 PR PBB SHADOW E&M-EST. PATIENT-LVL III: ICD-10-PCS | Mod: PBBFAC,,, | Performed by: FAMILY MEDICINE

## 2021-11-27 LAB
HBA1C MFR BLD: 5.6 % (ref 4.8–5.6)
HCV AB S/CO SERPL IA: 0.2 S/CO RATIO (ref 0–0.9)
HIV 1+2 AB+HIV1 P24 AG SERPL QL IA: NON REACTIVE
PSA SERPL-MCNC: 0.5 NG/ML (ref 0–4)

## 2021-12-23 ENCOUNTER — OFFICE VISIT (OUTPATIENT)
Dept: OTOLARYNGOLOGY | Facility: CLINIC | Age: 56
End: 2021-12-23
Payer: COMMERCIAL

## 2021-12-23 VITALS — BODY MASS INDEX: 32.51 KG/M2 | HEIGHT: 72 IN | WEIGHT: 240 LBS

## 2021-12-23 DIAGNOSIS — K21.9 GASTROESOPHAGEAL REFLUX DISEASE WITHOUT ESOPHAGITIS: ICD-10-CM

## 2021-12-23 DIAGNOSIS — D23.39 PAPILLOMA OF NOSE: Primary | ICD-10-CM

## 2021-12-23 PROCEDURE — 99999 PR PBB SHADOW E&M-EST. PATIENT-LVL III: CPT | Mod: PBBFAC,,, | Performed by: OTOLARYNGOLOGY

## 2021-12-23 PROCEDURE — 99999 PR PBB SHADOW E&M-EST. PATIENT-LVL III: ICD-10-PCS | Mod: PBBFAC,,, | Performed by: OTOLARYNGOLOGY

## 2021-12-23 PROCEDURE — 99213 OFFICE O/P EST LOW 20 MIN: CPT | Mod: 25,S$GLB,, | Performed by: OTOLARYNGOLOGY

## 2021-12-23 PROCEDURE — 31231 NASAL ENDOSCOPY DX: CPT | Mod: S$GLB,,, | Performed by: OTOLARYNGOLOGY

## 2021-12-23 PROCEDURE — 99213 PR OFFICE/OUTPT VISIT, EST, LEVL III, 20-29 MIN: ICD-10-PCS | Mod: 25,S$GLB,, | Performed by: OTOLARYNGOLOGY

## 2021-12-23 PROCEDURE — 31231 NASAL/SINUS ENDOSCOPY: ICD-10-PCS | Mod: S$GLB,,, | Performed by: OTOLARYNGOLOGY

## 2021-12-23 RX ORDER — TERBINAFINE HYDROCHLORIDE 250 MG/1
250 TABLET ORAL DAILY
COMMUNITY
Start: 2021-12-08 | End: 2022-06-17

## 2022-05-30 ENCOUNTER — OFFICE VISIT (OUTPATIENT)
Dept: OPTOMETRY | Facility: CLINIC | Age: 57
End: 2022-05-30
Payer: COMMERCIAL

## 2022-05-30 DIAGNOSIS — H43.812 POSTERIOR VITREOUS DETACHMENT OF LEFT EYE: Primary | ICD-10-CM

## 2022-05-30 PROCEDURE — 92004 COMPRE OPH EXAM NEW PT 1/>: CPT | Mod: S$GLB,,, | Performed by: OPTOMETRIST

## 2022-05-30 PROCEDURE — 99999 PR PBB SHADOW E&M-EST. PATIENT-LVL III: CPT | Mod: PBBFAC,,, | Performed by: OPTOMETRIST

## 2022-05-30 PROCEDURE — 99999 PR PBB SHADOW E&M-EST. PATIENT-LVL III: ICD-10-PCS | Mod: PBBFAC,,, | Performed by: OPTOMETRIST

## 2022-05-30 PROCEDURE — 92004 PR EYE EXAM, NEW PATIENT,COMPREHESV: ICD-10-PCS | Mod: S$GLB,,, | Performed by: OPTOMETRIST

## 2022-05-30 NOTE — PROGRESS NOTES
"HPI     Pt here for urgent exam for new floater OS. Pt sts noticed on Saturday at   pool party. Denies head trauma. Pt sts when in the dark he can see "a   little flutter of lights" for a second in OS. Denies eye pain.     Last edited by Rachele Ball MA on 5/30/2022  2:12 PM. (History)            Assessment /Plan     For exam results, see Encounter Report.    Posterior vitreous detachment of left eye      1. No evidence of holes, tears or detachment of retina. Negative Shafers sign in vitreous. 90 diopter lens exam negative in all quadrants. Patient educated to signs and symptoms of retinal detachment and return to clinic immediately if signs or symptoms arise. RTC 1 month follow up or sooner if any worsening or no improvement in symptoms.                  "

## 2022-06-17 ENCOUNTER — OFFICE VISIT (OUTPATIENT)
Dept: GASTROENTEROLOGY | Facility: CLINIC | Age: 57
End: 2022-06-17
Payer: COMMERCIAL

## 2022-06-17 VITALS
SYSTOLIC BLOOD PRESSURE: 137 MMHG | HEART RATE: 72 BPM | DIASTOLIC BLOOD PRESSURE: 81 MMHG | WEIGHT: 252.88 LBS | BODY MASS INDEX: 34.3 KG/M2

## 2022-06-17 DIAGNOSIS — K21.00 GASTROESOPHAGEAL REFLUX DISEASE WITH ESOPHAGITIS WITHOUT HEMORRHAGE: Primary | ICD-10-CM

## 2022-06-17 PROCEDURE — 99999 PR PBB SHADOW E&M-EST. PATIENT-LVL III: ICD-10-PCS | Mod: PBBFAC,,, | Performed by: NURSE PRACTITIONER

## 2022-06-17 PROCEDURE — 99214 PR OFFICE/OUTPT VISIT, EST, LEVL IV, 30-39 MIN: ICD-10-PCS | Mod: S$GLB,,, | Performed by: NURSE PRACTITIONER

## 2022-06-17 PROCEDURE — 99999 PR PBB SHADOW E&M-EST. PATIENT-LVL III: CPT | Mod: PBBFAC,,, | Performed by: NURSE PRACTITIONER

## 2022-06-17 PROCEDURE — 99214 OFFICE O/P EST MOD 30 MIN: CPT | Mod: S$GLB,,, | Performed by: NURSE PRACTITIONER

## 2022-06-17 RX ORDER — MECOBALAMIN 1000 MCG
TABLET,CHEWABLE ORAL
COMMUNITY
End: 2022-10-14

## 2022-06-17 NOTE — PROGRESS NOTES
Subjective:       Patient ID: Walt Lopez is a 56 y.o. male.    Chief Complaint: Gastroesophageal Reflux    55 y/o male with GERD presents to clinic for follow up. Patient reports symptoms resolved and would like to stop PPI. EGD 10/2021 showed reflux esophagitis. No issues with dysphagia or cough. Will titrate off medication and resume medication if symptoms return.      Past Medical History:   Diagnosis Date    Atrial fibrillation     History of prior ablation treatment     Hypothyroidism     S/P ablation of atrial fibrillation        Past Surgical History:   Procedure Laterality Date    CARDIAC ELECTROPHYSIOLOGY STUDY AND ABLATION      ESOPHAGOGASTRODUODENOSCOPY N/A 10/28/2021    Procedure: EGD (ESOPHAGOGASTRODUODENOSCOPY);  Surgeon: Patricia Soni MD;  [facility]: [facility];  Service: Endoscopy;  Laterality: N/A;    KNEE SURGERY      NASAL SEPTOPLASTY N/A 7/29/2021    Procedure: SEPTOPLASTY, NOSE;  Surgeon: Josué Villafuerte MD;  Location: [address];  Service: ENT;  Laterality: N/A;    NASAL TURBINATE REDUCTION Bilateral 7/29/2021    Procedure: REDUCTION, NASAL TURBINATE;  Surgeon: Josué Villafuerte MD;  Location: [facility] OR [address] [place] [address];  Service: ENT;  Laterality: Bilateral;       Family History   Problem Relation Age of Onset    Hypertension Mother     Atrial fibrillation Mother     Cancer Father        Social History     Socioeconomic History    Marital status:    Tobacco Use    Smoking status: Never Smoker    Smokeless tobacco: Never Used   Substance and Sexual Activity    Alcohol use: Yes     Comment: occasional    Drug use: Never    Sexual activity: Yes     Partners: Female       Review of Systems   Constitutional: Negative for appetite change, fatigue, fever and unexpected weight change.   HENT: Negative for trouble swallowing.    Respiratory: Negative for cough, choking and shortness of breath.    Cardiovascular: Negative for chest pain.   Gastrointestinal: Negative  The patient is a 70y Female complaining of diarrhea. for abdominal pain.   Musculoskeletal: Negative for back pain.   Neurological: Negative for dizziness and weakness.   Hematological: Negative for adenopathy. Does not bruise/bleed easily.   Psychiatric/Behavioral: Negative for dysphoric mood.         Objective:     Vitals:    06/17/22 1126   BP: 137/81   BP Location: Right arm   Patient Position: Sitting   BP Method: Large (Automatic)   Pulse: 72   Weight: 114.7 kg (252 lb 14.4 oz)          Physical Exam  Constitutional:       General: He is not in acute distress.     Appearance: Normal appearance. He is not ill-appearing.   HENT:      Head: Normocephalic.   Eyes:      Conjunctiva/sclera: Conjunctivae normal.      Pupils: Pupils are equal, round, and reactive to light.   Pulmonary:      Effort: Pulmonary effort is normal. No respiratory distress.   Musculoskeletal:         General: Normal range of motion.      Cervical back: Normal range of motion.   Skin:     General: Skin is warm and dry.   Neurological:      Mental Status: He is alert and oriented to person, place, and time.   Psychiatric:         Mood and Affect: Mood normal.         Behavior: Behavior normal.               Assessment:         ICD-10-CM ICD-9-CM   1. Gastroesophageal reflux disease with esophagitis without hemorrhage  K21.00 530.81     530.10       Plan:       Gastroesophageal reflux disease with esophagitis without hemorrhage  - May stop medication and resume if symptoms recur.    Follow up if symptoms worsen or fail to improve.     Patient's Medications   New Prescriptions    No medications on file   Previous Medications    LEVOTHYROXINE 200 MCG CAP    Take 200 mcg by mouth.     MECOBALAMIN, VITAMIN B12, (B12 ACTIVE) 1,000 MCG CHEW        MULTIVIT-MIN/FA/LYCOPEN/LUTEIN (CENTRUM SILVER MEN ORAL)    Take 1 tablet by mouth once daily.    OMEGA-3 FATTY ACIDS-FISH -1,200 MG CPDR    Take 1,000 mg by mouth.    PANTOPRAZOLE (PROTONIX) 40 MG TABLET    Take 1 tablet (40 mg total) by mouth once  daily.    TERBINAFINE HCL (LAMISIL) 250 MG TABLET    Take 250 mg by mouth once daily.   Modified Medications    No medications on file   Discontinued Medications    No medications on file

## 2022-06-23 ENCOUNTER — OFFICE VISIT (OUTPATIENT)
Dept: OTOLARYNGOLOGY | Facility: CLINIC | Age: 57
End: 2022-06-23
Payer: COMMERCIAL

## 2022-06-23 VITALS — HEIGHT: 72 IN | WEIGHT: 250 LBS | BODY MASS INDEX: 33.86 KG/M2

## 2022-06-23 DIAGNOSIS — D23.39 PAPILLOMA OF NOSE: Primary | ICD-10-CM

## 2022-06-23 DIAGNOSIS — H61.23 IMPACTED CERUMEN OF BOTH EARS: ICD-10-CM

## 2022-06-23 PROCEDURE — 69210 REMOVE IMPACTED EAR WAX UNI: CPT | Mod: 51,S$GLB,, | Performed by: OTOLARYNGOLOGY

## 2022-06-23 PROCEDURE — 31231 NASAL/SINUS ENDOSCOPY: ICD-10-PCS | Mod: S$GLB,,, | Performed by: OTOLARYNGOLOGY

## 2022-06-23 PROCEDURE — 99212 PR OFFICE/OUTPT VISIT, EST, LEVL II, 10-19 MIN: ICD-10-PCS | Mod: 25,S$GLB,, | Performed by: OTOLARYNGOLOGY

## 2022-06-23 PROCEDURE — 69210 EAR CERUMEN REMOVAL: ICD-10-PCS | Mod: 51,S$GLB,, | Performed by: OTOLARYNGOLOGY

## 2022-06-23 PROCEDURE — 99212 OFFICE O/P EST SF 10 MIN: CPT | Mod: 25,S$GLB,, | Performed by: OTOLARYNGOLOGY

## 2022-06-23 PROCEDURE — 99999 PR PBB SHADOW E&M-EST. PATIENT-LVL III: ICD-10-PCS | Mod: PBBFAC,,, | Performed by: OTOLARYNGOLOGY

## 2022-06-23 PROCEDURE — 99999 PR PBB SHADOW E&M-EST. PATIENT-LVL III: CPT | Mod: PBBFAC,,, | Performed by: OTOLARYNGOLOGY

## 2022-06-23 PROCEDURE — 31231 NASAL ENDOSCOPY DX: CPT | Mod: S$GLB,,, | Performed by: OTOLARYNGOLOGY

## 2022-06-23 NOTE — PROGRESS NOTES
Subjective:      Walt is a 56 y.o. male who comes for follow-up of sinusitis.  His last visit with me was on 12/23/2021.  Now nearly 11 months status-post endoscopic sinus surgery.   Doing well, no blockage, facial pain, epistaxis.  Some minor congestion and postnasal drip.  Also left ear fullness and muffled hearing.    SNOT-22 score: : (P) 2  NOSE score:: (P) 10%  ETDQ-7 score:: (P) 1.4    The patient's medications, allergies, past medical, surgical, social and family histories were reviewed and updated as appropriate.    A detailed review of systems was obtained with pertinent positives as per the above HPI, and otherwise negative.        Objective:     Ht 6' (1.829 m)   Wt 113.4 kg (250 lb)   BMI 33.91 kg/m²        Constitutional:   He appears well-developed. He is cooperative. Normal speech.  No hoarse voice.      Head:  Normocephalic. Salivary glands normal.  Facial strength is normal.      Ears:    Right Ear: No drainage or tenderness. Tympanic membrane is not perforated. Tympanic membrane mobility is normal. No middle ear effusion. No decreased hearing is noted.   Left Ear: No drainage or tenderness. Tympanic membrane is not perforated. Tympanic membrane mobility is normal.  No middle ear effusion. No decreased hearing is noted.     Nose:  No mucosal edema, rhinorrhea, septal deviation or polyps. No epistaxis. Turbinates normal, no turbinate masses and no turbinate hypertrophy.  Right sinus exhibits no maxillary sinus tenderness and no frontal sinus tenderness. Left sinus exhibits no maxillary sinus tenderness and no frontal sinus tenderness.     Mouth/Throat  Oropharynx clear and moist without lesions or asymmetry and normal uvula midline. He does not have dentures. Normal dentition. No oral lesions or mucous membrane lesions. No oropharyngeal exudate or posterior oropharyngeal erythema. Mirror exam not performed due to patient tolerance.  Mirror exam not performed due to patient tolerance.       Neck:  Neck normal without thyromegaly masses, asymmetry, normal tracheal structure, crepitus, and tenderness, thyroid normal, trachea normal and no adenopathy. Normal range of motion present.     He has no cervical adenopathy.     Cardiovascular:   Regular rhythm.      Pulmonary/Chest:   Effort normal.     Psychiatric:   He has a normal mood and affect. His speech is normal and behavior is normal.     Neurological:   No cranial nerve deficit.     Skin:   No rash noted.       Procedure    Cerumen impaction removed.  See procedure note.    Nasal endoscopy performed.  See procedure note.        Data Reviewed    WBC (K/uL)   Date Value   02/04/2021 9.92     Eosinophil % (%)   Date Value   02/04/2021 1.4     Eos # (K/uL)   Date Value   02/04/2021 0.1     Platelets (K/uL)   Date Value   02/04/2021 200     Glucose (mg/dL)   Date Value   02/05/2021 128 (H)     No results found for: IGE    Pathology report indicated exophytic papilloma.           Assessment:     1. Papilloma of nose    2. Impacted cerumen of both ears         Plan:     Reassurance, no evidence of recurrence.  Try debrox for wax prevention.  Follow up if symptoms worsen or fail to improve.

## 2022-06-23 NOTE — PROCEDURES
Nasal/sinus endoscopy    Date/Time: 6/23/2022 8:45 AM  Performed by: Josué Villafuerte MD  Authorized by: Josué Villafuerte MD     Consent Done?:  Yes (Verbal)  Anesthesia:     Local anesthetic:  Lidocaine 4% and Benoit-Synephrine 1/2%    Patient tolerance:  Patient tolerated the procedure well with no immediate complications  Nose:     Procedure Performed:  Nasal Endoscopy  External:      No external nasal deformity  Intranasal:      Mucosa no polyps     Mucosa ulcers not present     No mucosa lesions present     Turbinates not enlarged     No septum gross deformity  Nasopharynx:      No mucosa lesions     Adenoids not present     Posterior choanae patent     Eustachian tube patent     No papilloma or mass

## 2022-06-23 NOTE — PROCEDURES
Ear Cerumen Removal    Date/Time: 6/23/2022 8:45 AM  Performed by: Josué Villafuerte MD  Authorized by: Josué Villafuerte MD     Consent Done?:  Yes (Verbal)    Local anesthetic:  None  Location details:  Both ears  Procedure type: curette    Cerumen  Removal Results:  Cerumen completely removed  Patient tolerance:  Patient tolerated the procedure well with no immediate complications

## 2022-09-05 PROBLEM — E87.6 HYPOKALEMIA: Status: ACTIVE | Noted: 2022-09-05

## 2022-09-12 ENCOUNTER — OFFICE VISIT (OUTPATIENT)
Dept: CARDIOLOGY | Facility: CLINIC | Age: 57
End: 2022-09-12
Payer: COMMERCIAL

## 2022-09-12 VITALS
SYSTOLIC BLOOD PRESSURE: 118 MMHG | HEART RATE: 75 BPM | WEIGHT: 246 LBS | BODY MASS INDEX: 33.32 KG/M2 | HEIGHT: 72 IN | DIASTOLIC BLOOD PRESSURE: 72 MMHG | OXYGEN SATURATION: 98 %

## 2022-09-12 DIAGNOSIS — I48.0 PAF (PAROXYSMAL ATRIAL FIBRILLATION): ICD-10-CM

## 2022-09-12 DIAGNOSIS — Z86.79 S/P ABLATION OF ATRIAL FLUTTER: ICD-10-CM

## 2022-09-12 DIAGNOSIS — Z98.890 S/P ABLATION OF ATRIAL FLUTTER: ICD-10-CM

## 2022-09-12 PROCEDURE — 99999 PR PBB SHADOW E&M-EST. PATIENT-LVL III: CPT | Mod: PBBFAC,,, | Performed by: INTERNAL MEDICINE

## 2022-09-12 PROCEDURE — 99999 PR PBB SHADOW E&M-EST. PATIENT-LVL III: ICD-10-PCS | Mod: PBBFAC,,, | Performed by: INTERNAL MEDICINE

## 2022-09-12 PROCEDURE — 93000 ELECTROCARDIOGRAM COMPLETE: CPT | Mod: S$GLB,,, | Performed by: INTERNAL MEDICINE

## 2022-09-12 PROCEDURE — 93000 EKG 12-LEAD: ICD-10-PCS | Mod: S$GLB,,, | Performed by: INTERNAL MEDICINE

## 2022-09-12 PROCEDURE — 99204 OFFICE O/P NEW MOD 45 MIN: CPT | Mod: 25,S$GLB,, | Performed by: INTERNAL MEDICINE

## 2022-09-12 PROCEDURE — 99204 PR OFFICE/OUTPT VISIT, NEW, LEVL IV, 45-59 MIN: ICD-10-PCS | Mod: 25,S$GLB,, | Performed by: INTERNAL MEDICINE

## 2022-09-12 NOTE — ASSESSMENT & PLAN NOTE
CHADSVAS=0.  Continue w/ ASA.  Pt was previously followed by [facility] and is s/p RFA.    - obtain [facility] records for EP  - continue medical therapy   - risk factor and lifestyle modifications   - check ECG  - refer to EP  - check ETT to r/o ischemia as may attempt pill in pocket method v. maintenance

## 2022-09-13 DIAGNOSIS — I48.0 PAF (PAROXYSMAL ATRIAL FIBRILLATION): Primary | ICD-10-CM

## 2022-09-14 PROBLEM — E87.6 HYPOKALEMIA: Status: RESOLVED | Noted: 2022-09-05 | Resolved: 2022-09-14

## 2022-10-14 ENCOUNTER — OFFICE VISIT (OUTPATIENT)
Dept: CARDIOLOGY | Facility: CLINIC | Age: 57
End: 2022-10-14
Payer: COMMERCIAL

## 2022-10-14 VITALS
HEART RATE: 75 BPM | BODY MASS INDEX: 32.89 KG/M2 | DIASTOLIC BLOOD PRESSURE: 81 MMHG | SYSTOLIC BLOOD PRESSURE: 118 MMHG | WEIGHT: 242.5 LBS

## 2022-10-14 DIAGNOSIS — I48.4 ATYPICAL ATRIAL FLUTTER: ICD-10-CM

## 2022-10-14 DIAGNOSIS — I48.0 PAF (PAROXYSMAL ATRIAL FIBRILLATION): Primary | ICD-10-CM

## 2022-10-14 PROCEDURE — 99204 PR OFFICE/OUTPT VISIT, NEW, LEVL IV, 45-59 MIN: ICD-10-PCS | Mod: S$GLB,,, | Performed by: INTERNAL MEDICINE

## 2022-10-14 PROCEDURE — 93010 ELECTROCARDIOGRAM REPORT: CPT | Mod: S$GLB,,, | Performed by: INTERNAL MEDICINE

## 2022-10-14 PROCEDURE — 93005 EKG 12-LEAD: ICD-10-PCS | Mod: S$GLB,,, | Performed by: INTERNAL MEDICINE

## 2022-10-14 PROCEDURE — 93005 ELECTROCARDIOGRAM TRACING: CPT | Mod: S$GLB,,, | Performed by: INTERNAL MEDICINE

## 2022-10-14 PROCEDURE — 99204 OFFICE O/P NEW MOD 45 MIN: CPT | Mod: S$GLB,,, | Performed by: INTERNAL MEDICINE

## 2022-10-14 PROCEDURE — 93010 EKG 12-LEAD: ICD-10-PCS | Mod: S$GLB,,, | Performed by: INTERNAL MEDICINE

## 2022-10-14 PROCEDURE — 99999 PR PBB SHADOW E&M-EST. PATIENT-LVL III: ICD-10-PCS | Mod: PBBFAC,,, | Performed by: INTERNAL MEDICINE

## 2022-10-14 PROCEDURE — 99999 PR PBB SHADOW E&M-EST. PATIENT-LVL III: CPT | Mod: PBBFAC,,, | Performed by: INTERNAL MEDICINE

## 2022-10-14 RX ORDER — DILTIAZEM HYDROCHLORIDE 30 MG/1
30 TABLET, FILM COATED ORAL 4 TIMES DAILY PRN
Qty: 120 TABLET | Refills: 11 | Status: SHIPPED | OUTPATIENT
Start: 2022-10-14 | End: 2023-08-28

## 2022-10-14 NOTE — PROGRESS NOTES
Subjective:    Patient ID:  Walt Lopez is a 57 y.o. male who presents for evaluation of Atrial Fibrillation and Atrial Flutter    Referring Cardiologist: Kiel Britton III, MD    HPI  I had the pleasure of seeing Mr. Lopez today in our electrophysiology clinic in consultation for his atrial arrhythmias. As you are aware he is a pleasant 57 year-old man with paroxysmal atrial fibrillation and atypical atrial flutter s/p redo-PVI x 3 and mitral annular flutter ablation by Dr. Chaudhari in March of 2021. At that ablation procedure his right veins required re-isolation. The patient presented in atrial flutter which per procedure report was consistent with mitral annular flutter. He underwent a lateral mitral isthmus ablation. He did well until recently when he presented to the ER on 9/4/2022 with palpitations and was in AFL with RVR. He was admitted to the ICU with a diltiazem and amiodarone drip however quickly converted to sinus rhythm. He had recently started diethylpropion for weight loss. He stopped this. Since that time he has done well.    ECHO 9/6/2022 notes preserved LV function.  Treadmill stress ECG 9/26/2022 noted no ischemia.    ECGs from 2/4-5/2021 and 9/4/2022 are consistent with atypical atrial flutter.    My interpretation of today's in clinic ECG is sinus rhythm with a rate of 74 bpm.    Review of Systems   Constitutional: Negative for fever and malaise/fatigue.   HENT:  Negative for congestion and sore throat.    Eyes:  Negative for blurred vision and visual disturbance.   Cardiovascular:  Negative for chest pain, dyspnea on exertion, irregular heartbeat, near-syncope, palpitations and syncope.   Respiratory:  Negative for cough and shortness of breath.    Hematologic/Lymphatic: Negative for bleeding problem. Does not bruise/bleed easily.   Skin: Negative.    Musculoskeletal: Negative.    Gastrointestinal:  Negative for bloating, abdominal pain, hematochezia and melena.   Neurological:   Negative for focal weakness and weakness.   Psychiatric/Behavioral: Negative.        Objective:    Physical Exam  Vitals reviewed.   Constitutional:       General: He is not in acute distress.     Appearance: He is well-developed. He is not diaphoretic.   HENT:      Head: Normocephalic and atraumatic.   Eyes:      General:         Right eye: No discharge.         Left eye: No discharge.      Conjunctiva/sclera: Conjunctivae normal.   Cardiovascular:      Rate and Rhythm: Normal rate and regular rhythm.      Heart sounds: No murmur heard.    No friction rub. No gallop.   Pulmonary:      Effort: Pulmonary effort is normal. No respiratory distress.      Breath sounds: Normal breath sounds. No wheezing or rales.   Abdominal:      General: Bowel sounds are normal. There is no distension.      Palpations: Abdomen is soft.      Tenderness: There is no abdominal tenderness.   Musculoskeletal:      Cervical back: Neck supple.   Skin:     General: Skin is warm and dry.   Neurological:      Mental Status: He is alert and oriented to person, place, and time.   Psychiatric:         Behavior: Behavior normal.         Thought Content: Thought content normal.         Judgment: Judgment normal.         Assessment:       1. PAF (paroxysmal atrial fibrillation)    2. Atypical atrial flutter         Plan:       In summary, Mr. Lopez is a pleasant 57 year-old man with paroxysmal atrial fibrillation and atypical atrial flutter s/p redo-PVI x 3 and mitral annular flutter ablation by Dr. Chaudhari in March of 2021 with recurrent atypical atrial flutter with RVR that may have been induced with weight loss medication. [id number] score is 0. He is on aspirin. Discussed if this occurs off the weight loss drug then recommend redo-ablation. He is inquiring about a pill in pocket type strategy. Discussed my concern was his recent episode was AFL with 2:1 AV conduction and 1c agent may paradoxically increase heart rate from this (would need a lot of  AV jen blockade concomitantly). For now will give as needed diltiazem and if it occurs again would be him back to the lab.    RTC in 6 months    Thank you for allowing me to participate in the care of this patient. Please do not hesitate to call me with any questions or concerns.    Godwin Argueta MD, PhD  Cardiac Electrophysiology

## 2022-11-23 ENCOUNTER — OFFICE VISIT (OUTPATIENT)
Dept: OTOLARYNGOLOGY | Facility: CLINIC | Age: 57
End: 2022-11-23
Payer: COMMERCIAL

## 2022-11-23 VITALS — HEIGHT: 72 IN | BODY MASS INDEX: 31.83 KG/M2 | WEIGHT: 235 LBS

## 2022-11-23 DIAGNOSIS — J34.0 FURUNCLE OF NOSE: Primary | ICD-10-CM

## 2022-11-23 DIAGNOSIS — D23.39 PAPILLOMA OF NOSE: ICD-10-CM

## 2022-11-23 PROCEDURE — 99999 PR PBB SHADOW E&M-EST. PATIENT-LVL III: CPT | Mod: PBBFAC,,, | Performed by: OTOLARYNGOLOGY

## 2022-11-23 PROCEDURE — 99214 PR OFFICE/OUTPT VISIT, EST, LEVL IV, 30-39 MIN: ICD-10-PCS | Mod: S$GLB,,, | Performed by: OTOLARYNGOLOGY

## 2022-11-23 PROCEDURE — 99999 PR PBB SHADOW E&M-EST. PATIENT-LVL III: ICD-10-PCS | Mod: PBBFAC,,, | Performed by: OTOLARYNGOLOGY

## 2022-11-23 PROCEDURE — 99214 OFFICE O/P EST MOD 30 MIN: CPT | Mod: S$GLB,,, | Performed by: OTOLARYNGOLOGY

## 2022-11-23 RX ORDER — OXYMETAZOLINE HYDROCHLORIDE 1 G/100G
1 CREAM TOPICAL EVERY MORNING
COMMUNITY
Start: 2022-11-04 | End: 2023-04-14

## 2022-11-23 RX ORDER — SULFAMETHOXAZOLE AND TRIMETHOPRIM 800; 160 MG/1; MG/1
1 TABLET ORAL 2 TIMES DAILY
Qty: 14 TABLET | Refills: 0 | Status: SHIPPED | OUTPATIENT
Start: 2022-11-23 | End: 2022-11-30

## 2022-11-23 RX ORDER — MUPIROCIN CALCIUM 20 MG/G
CREAM TOPICAL 2 TIMES DAILY
Qty: 15 G | Refills: 0 | Status: SHIPPED | OUTPATIENT
Start: 2022-11-23 | End: 2022-11-30

## 2022-11-23 NOTE — PROGRESS NOTES
Subjective:      Walt is a 57 y.o. male who comes for follow-up of sinusitis.  His last visit with me was on 6/23/2022.  Now 16 months status-post endoscopic sinus surgery.   Fine until 1 month ago, new onset of swollen red area to right side of nasal dip.  Saw dermatologist, given doxycycline 7 day course, some improvement then given Rhofade cream, but still persists.  Minimally tender, not today at all, unable to pop or drain.  No epistaxis or blockage.    SNOT-22 score: : (P) 3  NOSE score:: (P) 10%  ETDQ-7 score:: (P) 1.1    The patient's medications, allergies, past medical, surgical, social and family histories were reviewed and updated as appropriate.    A detailed review of systems was obtained with pertinent positives as per the above HPI, and otherwise negative.        Objective:     Ht 6' (1.829 m)   Wt 106.6 kg (235 lb)   BMI 31.87 kg/m²        Constitutional:   He appears well-developed. He is cooperative.     Head:  Normocephalic.     Nose:  No mucosal edema, rhinorrhea, septal deviation or polyps. No epistaxis. Turbinates normal, no turbinate masses and no turbinate hypertrophy.  Right sinus exhibits no maxillary sinus tenderness and no frontal sinus tenderness. Left sinus exhibits no maxillary sinus tenderness and no frontal sinus tenderness.   1 cm raised red swelling to right of nasal dip  Minimally tender, nonfluctuant  Vibrissiae trimmed on right, no evidence of lesion at vestibular apex    Mouth/Throat  Oropharynx clear and moist without lesions or asymmetry. No oropharyngeal exudate or posterior oropharyngeal erythema.     Neck:  No adenopathy. Normal range of motion present.     He has no cervical adenopathy.     Procedure    None        Data Reviewed    WBC (K/uL)   Date Value   09/06/2022 7.57     Eosinophil % (%)   Date Value   09/06/2022 1.8     Eos # (K/uL)   Date Value   09/06/2022 0.1     Platelets (K/uL)   Date Value   09/06/2022 153     Glucose (mg/dL)   Date Value   09/06/2022  97     No results found for: IGE    Pathology report indicated  exophytic papilloma .          Assessment:     1. Furuncle of nose    2. Papilloma of nose         Plan:     Reassurance, this is an external skin condition, no evidence of intranasal pathology.  Rx bactrim 7 day course.  Rx mupirocin ointment concurrently.  F/U with dermatologist if persists.  Will defer scope today as papilloma was removed over 1 year ago with low likelihood of recurrence.  Follow up if symptoms worsen or fail to improve.

## 2023-04-14 ENCOUNTER — OFFICE VISIT (OUTPATIENT)
Dept: CARDIOLOGY | Facility: CLINIC | Age: 58
End: 2023-04-14
Payer: COMMERCIAL

## 2023-04-14 VITALS
HEIGHT: 72 IN | WEIGHT: 248.69 LBS | BODY MASS INDEX: 33.68 KG/M2 | SYSTOLIC BLOOD PRESSURE: 131 MMHG | HEART RATE: 68 BPM | DIASTOLIC BLOOD PRESSURE: 87 MMHG

## 2023-04-14 DIAGNOSIS — I48.4 ATYPICAL ATRIAL FLUTTER: ICD-10-CM

## 2023-04-14 DIAGNOSIS — I48.0 PAF (PAROXYSMAL ATRIAL FIBRILLATION): Primary | ICD-10-CM

## 2023-04-14 DIAGNOSIS — Z98.890 S/P ABLATION OF ATRIAL FLUTTER: ICD-10-CM

## 2023-04-14 DIAGNOSIS — Z86.79 S/P ABLATION OF ATRIAL FLUTTER: ICD-10-CM

## 2023-04-14 PROCEDURE — 99999 PR PBB SHADOW E&M-EST. PATIENT-LVL III: ICD-10-PCS | Mod: PBBFAC,,, | Performed by: INTERNAL MEDICINE

## 2023-04-14 PROCEDURE — 93010 ELECTROCARDIOGRAM REPORT: CPT | Mod: S$GLB,,, | Performed by: INTERNAL MEDICINE

## 2023-04-14 PROCEDURE — 93005 EKG 12-LEAD: ICD-10-PCS | Mod: S$GLB,,, | Performed by: INTERNAL MEDICINE

## 2023-04-14 PROCEDURE — 93010 EKG 12-LEAD: ICD-10-PCS | Mod: S$GLB,,, | Performed by: INTERNAL MEDICINE

## 2023-04-14 PROCEDURE — 99214 OFFICE O/P EST MOD 30 MIN: CPT | Mod: S$GLB,,, | Performed by: INTERNAL MEDICINE

## 2023-04-14 PROCEDURE — 99214 PR OFFICE/OUTPT VISIT, EST, LEVL IV, 30-39 MIN: ICD-10-PCS | Mod: S$GLB,,, | Performed by: INTERNAL MEDICINE

## 2023-04-14 PROCEDURE — 93005 ELECTROCARDIOGRAM TRACING: CPT | Mod: S$GLB,,, | Performed by: INTERNAL MEDICINE

## 2023-04-14 PROCEDURE — 99999 PR PBB SHADOW E&M-EST. PATIENT-LVL III: CPT | Mod: PBBFAC,,, | Performed by: INTERNAL MEDICINE

## 2023-04-14 RX ORDER — MELOXICAM 15 MG/1
15 TABLET ORAL
COMMUNITY
Start: 2023-04-06 | End: 2023-08-28

## 2023-04-14 NOTE — PROGRESS NOTES
Subjective:    Patient ID:  Walt Lopez is a 57 y.o. male who presents for evaluation of No chief complaint on file.    Referring Cardiologist: Kiel Britton III, MD    HPI  Prior Hx:  I had the pleasure of seeing Mr. Lopez today in our electrophysiology clinic in follow-up for his atrial arrhythmias. As you are aware he is a pleasant 57 year-old man with paroxysmal atrial fibrillation and atypical atrial flutter s/p redo-PVI x 3 and mitral annular flutter ablation by Dr. Chaudhari in March of 2021. At that ablation procedure his right veins required re-isolation. The patient presented in atrial flutter which per procedure report was consistent with mitral annular flutter. He underwent a lateral mitral isthmus ablation. He did well until recently when he presented to the ER on 9/4/2022 with palpitations and was in AFL with RVR. He was admitted to the ICU with a diltiazem and amiodarone drip however quickly converted to sinus rhythm. He had recently started diethylpropion for weight loss. He stopped this. Since that time he has done well. At our initial visit in October of 2022 we discussed treatment options. He was stopping the weight loss drug. He was inquiring about a pill in pocket type strategy. Discussed my concern was his recent episode was AFL with 2:1 AV conduction and 1c agent may paradoxically increase heart rate from this (would need a lot of AV jen blockade concomitantly). For now will give as needed diltiazem and if it occurs again would be him back to the lab.    ECHO 9/6/2022 notes preserved LV function.  Treadmill stress ECG 9/26/2022 noted no ischemia.    ECGs from 2/4-5/2021 and 9/4/2022 are consistent with atypical atrial flutter.    Interim Hx:  Mr. Lopez returns for follow-up. No symptomatic AF/AFL. Only occasional skipped beats.    My interpretation of today's in clinic ECG is sinus rhythm with incomplete RBBB    Review of Systems   Constitutional: Negative for fever and  malaise/fatigue.   HENT:  Negative for congestion and sore throat.    Eyes:  Negative for blurred vision and visual disturbance.   Cardiovascular:  Positive for palpitations. Negative for chest pain, dyspnea on exertion, irregular heartbeat, near-syncope and syncope.   Respiratory:  Negative for cough and shortness of breath.    Hematologic/Lymphatic: Negative for bleeding problem. Does not bruise/bleed easily.   Skin: Negative.    Musculoskeletal: Negative.    Gastrointestinal:  Negative for bloating, abdominal pain, hematochezia and melena.   Neurological:  Negative for focal weakness and weakness.   Psychiatric/Behavioral: Negative.        Objective:    Physical Exam  Vitals reviewed.   Constitutional:       General: He is not in acute distress.     Appearance: He is well-developed. He is not diaphoretic.   HENT:      Head: Normocephalic and atraumatic.   Eyes:      General:         Right eye: No discharge.         Left eye: No discharge.      Conjunctiva/sclera: Conjunctivae normal.   Cardiovascular:      Rate and Rhythm: Normal rate and regular rhythm.      Heart sounds: No murmur heard.    No friction rub. No gallop.   Pulmonary:      Effort: Pulmonary effort is normal. No respiratory distress.      Breath sounds: Normal breath sounds. No wheezing or rales.   Abdominal:      General: Bowel sounds are normal. There is no distension.      Palpations: Abdomen is soft.      Tenderness: There is no abdominal tenderness.   Musculoskeletal:      Cervical back: Neck supple.   Skin:     General: Skin is warm and dry.   Neurological:      Mental Status: He is alert and oriented to person, place, and time.   Psychiatric:         Behavior: Behavior normal.         Thought Content: Thought content normal.         Judgment: Judgment normal.         Assessment:       1. PAF (paroxysmal atrial fibrillation)    2. Atypical atrial flutter    3. S/P ablation of atrial flutter         Plan:       In summary, Mr. Lopez is a  pleasant 57 year-old man with paroxysmal atrial fibrillation and atypical atrial flutter s/p redo-PVI x 3 and mitral annular flutter ablation by Dr. Chaudhari in March of 2021 with recurrent atypical atrial flutter with RVR that may have been induced with weight loss medication. [id number] score is 0. He is on aspirin. Discussed if this occurs off the weight loss drug then recommend redo-ablation. Continue prn diltiazem, hasn't needed any recently.    RTC in 6 months    Thank you for allowing me to participate in the care of this patient. Please do not hesitate to call me with any questions or concerns.    Godwin Argueta MD, PhD  Cardiac Electrophysiology

## 2023-08-28 ENCOUNTER — PATIENT MESSAGE (OUTPATIENT)
Dept: FAMILY MEDICINE | Facility: CLINIC | Age: 58
End: 2023-08-28

## 2023-08-28 ENCOUNTER — OFFICE VISIT (OUTPATIENT)
Dept: FAMILY MEDICINE | Facility: CLINIC | Age: 58
End: 2023-08-28
Payer: COMMERCIAL

## 2023-08-28 VITALS
DIASTOLIC BLOOD PRESSURE: 78 MMHG | SYSTOLIC BLOOD PRESSURE: 132 MMHG | OXYGEN SATURATION: 98 % | BODY MASS INDEX: 34.64 KG/M2 | TEMPERATURE: 98 F | HEART RATE: 91 BPM | WEIGHT: 255.75 LBS | HEIGHT: 72 IN

## 2023-08-28 DIAGNOSIS — E06.3 HYPOTHYROIDISM DUE TO HASHIMOTO'S THYROIDITIS: ICD-10-CM

## 2023-08-28 DIAGNOSIS — E78.2 MIXED HYPERLIPIDEMIA: ICD-10-CM

## 2023-08-28 DIAGNOSIS — R51.9 NONINTRACTABLE HEADACHE, UNSPECIFIED CHRONICITY PATTERN, UNSPECIFIED HEADACHE TYPE: ICD-10-CM

## 2023-08-28 DIAGNOSIS — E03.8 HYPOTHYROIDISM DUE TO HASHIMOTO'S THYROIDITIS: ICD-10-CM

## 2023-08-28 DIAGNOSIS — Z86.79 S/P ABLATION OF ATRIAL FLUTTER: ICD-10-CM

## 2023-08-28 DIAGNOSIS — Z98.890 S/P ABLATION OF ATRIAL FLUTTER: ICD-10-CM

## 2023-08-28 DIAGNOSIS — R73.03 PREDIABETES: ICD-10-CM

## 2023-08-28 DIAGNOSIS — Z00.00 ENCOUNTER FOR MEDICAL EXAMINATION TO ESTABLISH CARE: Primary | ICD-10-CM

## 2023-08-28 DIAGNOSIS — I48.0 PAF (PAROXYSMAL ATRIAL FIBRILLATION): ICD-10-CM

## 2023-08-28 DIAGNOSIS — Z12.5 SCREENING PSA (PROSTATE SPECIFIC ANTIGEN): ICD-10-CM

## 2023-08-28 DIAGNOSIS — E66.09 CLASS 1 OBESITY DUE TO EXCESS CALORIES WITH BODY MASS INDEX (BMI) OF 34.0 TO 34.9 IN ADULT, UNSPECIFIED WHETHER SERIOUS COMORBIDITY PRESENT: ICD-10-CM

## 2023-08-28 PROBLEM — J34.89 NASAL MASS: Status: RESOLVED | Noted: 2021-07-29 | Resolved: 2023-08-28

## 2023-08-28 PROBLEM — R09.A2 GLOBUS SENSATION: Status: RESOLVED | Noted: 2021-10-18 | Resolved: 2023-08-28

## 2023-08-28 PROBLEM — E66.811 CLASS 1 OBESITY DUE TO EXCESS CALORIES WITH BODY MASS INDEX (BMI) OF 34.0 TO 34.9 IN ADULT: Status: ACTIVE | Noted: 2023-08-28

## 2023-08-28 PROBLEM — R13.19 ESOPHAGEAL DYSPHAGIA: Status: RESOLVED | Noted: 2021-10-18 | Resolved: 2023-08-28

## 2023-08-28 PROCEDURE — 99214 PR OFFICE/OUTPT VISIT, EST, LEVL IV, 30-39 MIN: ICD-10-PCS | Mod: S$GLB,,, | Performed by: NURSE PRACTITIONER

## 2023-08-28 PROCEDURE — 99214 OFFICE O/P EST MOD 30 MIN: CPT | Mod: S$GLB,,, | Performed by: NURSE PRACTITIONER

## 2023-08-28 PROCEDURE — 99999 PR PBB SHADOW E&M-EST. PATIENT-LVL IV: ICD-10-PCS | Mod: PBBFAC,,, | Performed by: NURSE PRACTITIONER

## 2023-08-28 PROCEDURE — 99999 PR PBB SHADOW E&M-EST. PATIENT-LVL IV: CPT | Mod: PBBFAC,,, | Performed by: NURSE PRACTITIONER

## 2023-08-28 RX ORDER — IBUPROFEN 600 MG/1
600 TABLET ORAL 3 TIMES DAILY PRN
Qty: 30 TABLET | Refills: 1 | Status: SHIPPED | OUTPATIENT
Start: 2023-08-28

## 2023-08-28 RX ORDER — FLUTICASONE PROPIONATE 50 MCG
2 SPRAY, SUSPENSION (ML) NASAL DAILY
Qty: 16 G | Refills: 3 | Status: SHIPPED | OUTPATIENT
Start: 2023-08-28

## 2023-08-28 NOTE — PROGRESS NOTES
Subjective:       Patient ID: Walt Lopez is a 57 y.o. male.    Chief Complaint: Establish Care and Headache    ####NEW PATIENT TO ME - previous patient of Dr. Houser####    Patient is a 57 year old white male with paroxysmal AFIB S/P Cardiac Ablation, Hypothyroidism and Prediabetes followed by Endocrinology Dr. Alexander, and Mixed Hyperlipidemia that is here today to ESTABLISH CARE WITH NEW PROVIDER and complaint of mild frontal headache for past several weeks.    Paroxysmal AFIB  S/P cardiac ablation  Followed by Electrophysiology Dr. Argueta - last appt 4/14/2023  Taking ASA 81 mg daily    Hypothyroidism due to Hashimoto's  Followed by Endocrinology Dr. Alexander  Taking Levothyroxine 200 mcg daily  TSH 1.78 with Free T4 1.52 in May 2023  TPO Ab 231      Prediabetes  HgbA1C 5.7% in May 2023  Followed by Endocrinology Dr. Alexander    Mixed Hyperlipidemia  Total cholesterol 214 with  and HDL 38  10- year risk 8.7%  Recommendations for cholesterol med based on risk  States he is working on lifestyle modifications and diet - his endocrinologist is planning to recheck in 6 months  Gave handouts on diet and other lifestyle modifications to improve levels.      Obesity  Body mass index is 34.68 kg/m².    Headache   This is a new problem. Episode onset: past 3 to 4 weeks. The problem occurs constantly. The problem has been unchanged. The pain is located in the Bilateral and frontal region. The quality of the pain is described as dull and aching. The pain is at a severity of 1/10. The pain is mild. Associated symptoms include sinus pressure. Pertinent negatives include no blurred vision, dizziness, ear pain, eye pain, eye redness, eye watering, fever, loss of balance, phonophobia, photophobia or rhinorrhea. He has tried acetaminophen for the symptoms. His past medical history is significant for sinus disease. There is no history of migraine headaches.               Review of Systems   Constitutional:   Negative for fever.   HENT:  Positive for sinus pressure. Negative for ear pain and rhinorrhea.    Eyes:  Negative for blurred vision, photophobia, pain and redness.   Neurological:  Positive for headaches. Negative for dizziness and loss of balance.         Objective:     Vitals:    08/28/23 1414 08/28/23 1441   BP: (!) 140/70 132/78   BP Location: Left arm    Patient Position: Sitting    BP Method: Large (Manual)    Pulse: 91    Temp: 97.7 °F (36.5 °C)    TempSrc: Temporal    SpO2: 98%    Weight: 116 kg (255 lb 11.7 oz)    Height: 6' (1.829 m)           Physical Exam  Constitutional:       General: He is not in acute distress.     Appearance: Normal appearance. He is obese. He is not toxic-appearing.      Comments: Body mass index is 34.68 kg/m².     HENT:      Head: Normocephalic and atraumatic.      Right Ear: Tympanic membrane, ear canal and external ear normal.      Left Ear: Tympanic membrane, ear canal and external ear normal.      Nose: Congestion present. No rhinorrhea.      Mouth/Throat:      Pharynx: No oropharyngeal exudate or posterior oropharyngeal erythema.   Eyes:      Extraocular Movements: Extraocular movements intact.      Conjunctiva/sclera: Conjunctivae normal.   Cardiovascular:      Rate and Rhythm: Normal rate and regular rhythm.      Heart sounds: Normal heart sounds.   Pulmonary:      Effort: Pulmonary effort is normal. No respiratory distress.      Breath sounds: Normal breath sounds.   Abdominal:      General: Bowel sounds are normal. There is no distension.      Palpations: Abdomen is soft. There is no mass.      Tenderness: There is no abdominal tenderness. There is no guarding.      Hernia: No hernia is present.   Musculoskeletal:         General: Normal range of motion.      Right lower leg: No edema.      Left lower leg: No edema.   Skin:     General: Skin is warm and dry.   Neurological:      Mental Status: He is alert and oriented to person, place, and time.   Psychiatric:          Mood and Affect: Mood normal.         Behavior: Behavior normal.         Thought Content: Thought content normal.           Assessment:         ICD-10-CM ICD-9-CM   1. Encounter for medical examination to establish care  Z00.00 V70.9   2. PAF (paroxysmal atrial fibrillation)  I48.0 427.31   3. S/P ablation of atrial flutter  Z98.890 V45.89    Z86.79    4. Hypothyroidism due to Hashimoto's thyroiditis  E03.8 244.8    E06.3 245.2   5. Prediabetes  R73.03 790.29   6. Mixed hyperlipidemia  E78.2 272.2   7. Class 1 obesity due to excess calories with body mass index (BMI) of 34.0 to 34.9 in adult, unspecified whether serious comorbidity present  E66.09 278.00    Z68.34 V85.34   8. Screening PSA (prostate specific antigen)  Z12.5 V76.44   9. Nonintractable headache, unspecified chronicity pattern, unspecified headache type  R51.9 784.0       Plan:       Encounter for medical examination to establish care    PAF (paroxysmal atrial fibrillation)  S/p ablation  Followed by EP Dr. Kendall Phillips.    S/P ablation of atrial flutter    Hypothyroidism due to Hashimoto's thyroiditis  Stable  Followed by ENDO    Prediabetes  Work on diet    Mixed hyperlipidemia  Gave handouts on lifestyle modifications  Will be rechecking labs with umang at end of year.    Class 1 obesity due to excess calories with body mass index (BMI) of 34.0 to 34.9 in adult, unspecified whether serious comorbidity present  Work on lifestyle modifications.    Screening PSA (prostate specific antigen)  PSA level today  -     PSA, SCREENING; Future; Expected date: 08/28/2023    Nonintractable headache, unspecified chronicity pattern, unspecified headache type  -     fluticasone propionate (FLONASE) 50 mcg/actuation nasal spray; 2 sprays (100 mcg total) by Each Nostril route once daily.  Dispense: 16 g; Refill: 3  -     ibuprofen (ADVIL,MOTRIN) 600 MG tablet; Take 1 tablet (600 mg total) by mouth 3 (three) times daily as needed (headacne).  Dispense: 30 tablet;  Refill: 1          Patient's Medications   New Prescriptions    FLUTICASONE PROPIONATE (FLONASE) 50 MCG/ACTUATION NASAL SPRAY    2 sprays (100 mcg total) by Each Nostril route once daily.    IBUPROFEN (ADVIL,MOTRIN) 600 MG TABLET    Take 1 tablet (600 mg total) by mouth 3 (three) times daily as needed (headacne).   Previous Medications    ASPIRIN 81 MG CHEW    Take 1 tablet (81 mg total) by mouth once daily.    LEVOTHYROXINE 200 MCG CAP    Take 200 mcg by mouth.     MULTIVIT-MIN/FA/LYCOPEN/LUTEIN (CENTRUM SILVER MEN ORAL)    Take 1 tablet by mouth once daily.    OMEGA-3 FATTY ACIDS-FISH -1,200 MG CPDR    Take 1,000 mg by mouth.   Modified Medications    No medications on file   Discontinued Medications    DILTIAZEM (CARDIZEM) 30 MG TABLET    Take 1 tablet (30 mg total) by mouth 4 (four) times daily as needed (palpitations).    MELOXICAM (MOBIC) 15 MG TABLET    Take 15 mg by mouth.       Past Medical History:   Diagnosis Date    Atrial fibrillation     History of prior ablation treatment     Hypothyroidism     S/P ablation of atrial fibrillation        Past Surgical History:   Procedure Laterality Date    CARDIAC ELECTROPHYSIOLOGY STUDY AND ABLATION      ESOPHAGOGASTRODUODENOSCOPY N/A 10/28/2021    Procedure: EGD (ESOPHAGOGASTRODUODENOSCOPY);  Surgeon: Patricia Soni MD;  Location: [facility];  Service: Endoscopy;  Laterality: N/A;    KNEE SURGERY Right     NASAL SEPTOPLASTY N/A 07/29/2021    Procedure: SEPTOPLASTY, NOSE;  Surgeon: Josué Villafuerte MD;  Location: [address];  Service: ENT;  Laterality: N/A;    NASAL TURBINATE REDUCTION Bilateral 07/29/2021    Procedure: REDUCTION, NASAL TURBINATE;  Surgeon: Josué Villafuerte MD;  Location: [address];  Service: ENT;  Laterality: Bilateral;    SHOULDER ARTHROSCOPY Right        Family History   Problem Relation Age of Onset    Hypertension Mother     Atrial fibrillation Mother     Cancer Father         esophageal cancer    No Known Problems  Brother        Social History     Socioeconomic History    Marital status:    Occupational History    Occupation:    Tobacco Use    Smoking status: Never     Passive exposure: Never    Smokeless tobacco: Never   Substance and Sexual Activity    Alcohol use: Yes     Comment: once a month or less    Drug use: Never    Sexual activity: Yes     Partners: Female

## 2023-09-06 ENCOUNTER — PATIENT OUTREACH (OUTPATIENT)
Dept: ADMINISTRATIVE | Facility: HOSPITAL | Age: 58
End: 2023-09-06
Payer: COMMERCIAL

## 2023-09-14 ENCOUNTER — TELEPHONE (OUTPATIENT)
Dept: ELECTROPHYSIOLOGY | Facility: CLINIC | Age: 58
End: 2023-09-14
Payer: COMMERCIAL

## 2023-09-15 ENCOUNTER — OFFICE VISIT (OUTPATIENT)
Dept: CARDIOLOGY | Facility: CLINIC | Age: 58
End: 2023-09-15
Payer: COMMERCIAL

## 2023-09-15 VITALS
HEIGHT: 73 IN | HEART RATE: 74 BPM | BODY MASS INDEX: 33 KG/M2 | DIASTOLIC BLOOD PRESSURE: 80 MMHG | WEIGHT: 249 LBS | SYSTOLIC BLOOD PRESSURE: 112 MMHG

## 2023-09-15 DIAGNOSIS — I48.0 PAF (PAROXYSMAL ATRIAL FIBRILLATION): Primary | ICD-10-CM

## 2023-09-15 DIAGNOSIS — I48.4 ATYPICAL ATRIAL FLUTTER: ICD-10-CM

## 2023-09-15 PROCEDURE — 99213 PR OFFICE/OUTPT VISIT, EST, LEVL III, 20-29 MIN: ICD-10-PCS | Mod: S$GLB,,, | Performed by: INTERNAL MEDICINE

## 2023-09-15 PROCEDURE — 99213 OFFICE O/P EST LOW 20 MIN: CPT | Mod: S$GLB,,, | Performed by: INTERNAL MEDICINE

## 2023-09-15 PROCEDURE — 93010 EKG 12-LEAD: ICD-10-PCS | Mod: S$GLB,,, | Performed by: INTERNAL MEDICINE

## 2023-09-15 PROCEDURE — 93005 ELECTROCARDIOGRAM TRACING: CPT | Mod: S$GLB,,, | Performed by: INTERNAL MEDICINE

## 2023-09-15 PROCEDURE — 99999 PR PBB SHADOW E&M-EST. PATIENT-LVL III: CPT | Mod: PBBFAC,,, | Performed by: INTERNAL MEDICINE

## 2023-09-15 PROCEDURE — 99999 PR PBB SHADOW E&M-EST. PATIENT-LVL III: ICD-10-PCS | Mod: PBBFAC,,, | Performed by: INTERNAL MEDICINE

## 2023-09-15 PROCEDURE — 93005 EKG 12-LEAD: ICD-10-PCS | Mod: S$GLB,,, | Performed by: INTERNAL MEDICINE

## 2023-09-15 PROCEDURE — 93010 ELECTROCARDIOGRAM REPORT: CPT | Mod: S$GLB,,, | Performed by: INTERNAL MEDICINE

## 2023-09-15 NOTE — PROGRESS NOTES
Subjective:    Patient ID:  Walt Lopez is a 57 y.o. male who presents for evaluation of Atrial Fibrillation    Referring Cardiologist: Kiel Britton III, MD    HPI  Prior Hx:  I had the pleasure of seeing Mr. Lopez today in our electrophysiology clinic in follow-up for his atrial arrhythmias. As you are aware he is a pleasant 57 year-old man with paroxysmal atrial fibrillation and atypical atrial flutter s/p redo-PVI x 3 and mitral annular flutter ablation by Dr. Chaudhari in March of 2021. At that ablation procedure his right veins required re-isolation. The patient presented in atrial flutter which per procedure report was consistent with mitral annular flutter. He underwent a lateral mitral isthmus ablation. He did well until recently when he presented to the ER on 9/4/2022 with palpitations and was in AFL with RVR. He was admitted to the ICU with a diltiazem and amiodarone drip however quickly converted to sinus rhythm. He had recently started diethylpropion for weight loss. He stopped this. Since that time he has done well. At our initial visit in October of 2022 we discussed treatment options. He was stopping the weight loss drug. He was inquiring about a pill in pocket type strategy. Discussed my concern was his recent episode was AFL with 2:1 AV conduction and 1c agent may paradoxically increase heart rate from this (would need a lot of AV jen blockade concomitantly). For now will give as needed diltiazem and if it occurs again would be him back to the lab.    ECHO 9/6/2022 notes preserved LV function.  Treadmill stress ECG 9/26/2022 noted no ischemia.    ECGs from 2/4-5/2021 and 9/4/2022 are consistent with atypical atrial flutter.    4/2023: Mr. Lopez returns for follow-up. No symptomatic AF/AFL. Only occasional skipped beats.    Interim Hx:  Mr. Lopez returns for follow-up. Notes rare short brief palpitations.    My interpretation of today's in clinic ECG is sinus rhythm with incomplete  RBBB    Review of Systems   Constitutional: Negative for fever and malaise/fatigue.   HENT:  Negative for congestion and sore throat.    Eyes:  Negative for blurred vision and visual disturbance.   Cardiovascular:  Positive for palpitations. Negative for chest pain, dyspnea on exertion, irregular heartbeat, near-syncope and syncope.   Respiratory:  Negative for cough and shortness of breath.    Hematologic/Lymphatic: Negative for bleeding problem. Does not bruise/bleed easily.   Skin: Negative.    Musculoskeletal: Negative.    Gastrointestinal:  Negative for bloating, abdominal pain, hematochezia and melena.   Neurological:  Negative for focal weakness and weakness.   Psychiatric/Behavioral: Negative.          Objective:    Physical Exam  Vitals reviewed.   Constitutional:       General: He is not in acute distress.     Appearance: He is well-developed. He is not diaphoretic.   HENT:      Head: Normocephalic and atraumatic.   Eyes:      General:         Right eye: No discharge.         Left eye: No discharge.      Conjunctiva/sclera: Conjunctivae normal.   Cardiovascular:      Rate and Rhythm: Normal rate and regular rhythm.      Heart sounds: No murmur heard.     No friction rub. No gallop.   Pulmonary:      Effort: Pulmonary effort is normal. No respiratory distress.      Breath sounds: Normal breath sounds. No wheezing or rales.   Abdominal:      General: Bowel sounds are normal. There is no distension.      Palpations: Abdomen is soft.      Tenderness: There is no abdominal tenderness.   Musculoskeletal:      Cervical back: Neck supple.   Skin:     General: Skin is warm and dry.   Neurological:      Mental Status: He is alert and oriented to person, place, and time.   Psychiatric:         Behavior: Behavior normal.         Thought Content: Thought content normal.         Judgment: Judgment normal.           Assessment:       1. PAF (paroxysmal atrial fibrillation)    2. Atypical atrial flutter         Plan:        In summary, Mr. Lopez is a pleasant 57 year-old man with paroxysmal atrial fibrillation and atypical atrial flutter s/p redo-PVI x 3 and mitral annular flutter ablation by Dr. Chaudhari in March of 2021 with recurrent atypical atrial flutter with RVR that may have been induced with weight loss medication. [id number] score is 0. He is on aspirin. Discussed if this occurs off the weight loss drug then recommend redo-ablation. Continue prn diltiazem, hasn't needed any recently.    RTC in 6 months    Thank you for allowing me to participate in the care of this patient. Please do not hesitate to call me with any questions or concerns.    Godwin Argueta MD, PhD  Cardiac Electrophysiology

## 2024-05-31 LAB — HBA1C MFR BLD: 5.6 % (ref 4–6)

## 2024-06-06 ENCOUNTER — CLINICAL SUPPORT (OUTPATIENT)
Dept: OTOLARYNGOLOGY | Facility: CLINIC | Age: 59
End: 2024-06-06
Payer: COMMERCIAL

## 2024-06-06 ENCOUNTER — OFFICE VISIT (OUTPATIENT)
Dept: FAMILY MEDICINE | Facility: CLINIC | Age: 59
End: 2024-06-06
Payer: COMMERCIAL

## 2024-06-06 ENCOUNTER — OFFICE VISIT (OUTPATIENT)
Dept: OTOLARYNGOLOGY | Facility: CLINIC | Age: 59
End: 2024-06-06
Payer: COMMERCIAL

## 2024-06-06 ENCOUNTER — TELEPHONE (OUTPATIENT)
Dept: FAMILY MEDICINE | Facility: CLINIC | Age: 59
End: 2024-06-06
Payer: COMMERCIAL

## 2024-06-06 VITALS
BODY MASS INDEX: 33.54 KG/M2 | HEART RATE: 82 BPM | SYSTOLIC BLOOD PRESSURE: 146 MMHG | WEIGHT: 254.19 LBS | DIASTOLIC BLOOD PRESSURE: 87 MMHG

## 2024-06-06 VITALS
DIASTOLIC BLOOD PRESSURE: 80 MMHG | WEIGHT: 254.19 LBS | HEART RATE: 82 BPM | HEIGHT: 73 IN | SYSTOLIC BLOOD PRESSURE: 130 MMHG | BODY MASS INDEX: 33.69 KG/M2 | TEMPERATURE: 98 F | OXYGEN SATURATION: 98 %

## 2024-06-06 DIAGNOSIS — Z09 HOSPITAL DISCHARGE FOLLOW-UP: Primary | ICD-10-CM

## 2024-06-06 DIAGNOSIS — H93.293 ABNORMAL AUDITORY PERCEPTION OF BOTH EARS: Primary | ICD-10-CM

## 2024-06-06 DIAGNOSIS — I48.0 PAROXYSMAL ATRIAL FIBRILLATION WITH RVR: ICD-10-CM

## 2024-06-06 DIAGNOSIS — E06.3 HYPOTHYROIDISM DUE TO HASHIMOTO'S THYROIDITIS: ICD-10-CM

## 2024-06-06 DIAGNOSIS — E66.09 CLASS 1 OBESITY DUE TO EXCESS CALORIES WITH SERIOUS COMORBIDITY AND BODY MASS INDEX (BMI) OF 33.0 TO 33.9 IN ADULT: ICD-10-CM

## 2024-06-06 DIAGNOSIS — E78.2 MIXED HYPERLIPIDEMIA: ICD-10-CM

## 2024-06-06 DIAGNOSIS — Z77.122 HISTORY OF EXPOSURE TO NOISE: ICD-10-CM

## 2024-06-06 DIAGNOSIS — H93.299 ABNORMAL AUDITORY PERCEPTION, UNSPECIFIED LATERALITY: Primary | ICD-10-CM

## 2024-06-06 DIAGNOSIS — E03.8 HYPOTHYROIDISM DUE TO HASHIMOTO'S THYROIDITIS: ICD-10-CM

## 2024-06-06 DIAGNOSIS — R73.03 PREDIABETES: ICD-10-CM

## 2024-06-06 PROCEDURE — 92567 TYMPANOMETRY: CPT | Mod: S$GLB,,,

## 2024-06-06 PROCEDURE — 99999 PR PBB SHADOW E&M-EST. PATIENT-LVL I: CPT | Mod: PBBFAC,,,

## 2024-06-06 PROCEDURE — 99999 PR PBB SHADOW E&M-EST. PATIENT-LVL IV: CPT | Mod: PBBFAC,,, | Performed by: NURSE PRACTITIONER

## 2024-06-06 PROCEDURE — 99214 OFFICE O/P EST MOD 30 MIN: CPT | Mod: S$GLB,,, | Performed by: NURSE PRACTITIONER

## 2024-06-06 PROCEDURE — 92552 PURE TONE AUDIOMETRY AIR: CPT | Mod: S$GLB,,,

## 2024-06-06 PROCEDURE — 92556 SPEECH AUDIOMETRY COMPLETE: CPT | Mod: S$GLB,,,

## 2024-06-06 PROCEDURE — 99999 PR PBB SHADOW E&M-EST. PATIENT-LVL III: CPT | Mod: PBBFAC,,, | Performed by: NURSE PRACTITIONER

## 2024-06-06 PROCEDURE — 99213 OFFICE O/P EST LOW 20 MIN: CPT | Mod: S$GLB,,, | Performed by: NURSE PRACTITIONER

## 2024-06-06 RX ORDER — LORATADINE 10 MG/1
10 TABLET ORAL DAILY
COMMUNITY

## 2024-06-06 RX ORDER — LEVOTHYROXINE SODIUM 200 UG/1
200 TABLET ORAL
COMMUNITY

## 2024-06-06 NOTE — TELEPHONE ENCOUNTER
----- Message from Donna Hamilton sent at 6/6/2024  4:13 PM CDT -----  Type:  Needs Medical Advice    Who Called: Pt  Would the patient rather a call back or a response via [facility]? Callback   Best Call Back Number: [phone]  Additional Information: Caller requesting callback

## 2024-06-06 NOTE — TELEPHONE ENCOUNTER
Spoke with patient- patient was seen in office left a copy of his labs in office from [facility]- I ask patient if he wanted his copy back he said no he already one.

## 2024-06-06 NOTE — PROGRESS NOTES
Walt Lopez, a 58 y.o. male was seen today in the clinic for an audiologic evaluation. The patient's primary complaint was possible wax impaction. Patient  reports having abnormal auditory perception prior to using drops to help clear wax prior to today's appointment. He was seen by ENT prior to testing where no significant wax was found. 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 He denies tinnitus, ear pain, and dizziness.     Pure tone testing revealed normal hearing, bilaterally. Speech reception thresholds were obtained at 15 dBHL in the right ear and 15 dBHL in the left ear. Speech discrimination scores were 100% in the right ear and 100% in the left ear. Tympanometry revealed Type A tympanograms in both ears.    Recommendations:  Otologic evaluation  Annual audiologic evaluation  Hearing protection in noise

## 2024-06-06 NOTE — PROGRESS NOTES
"Subjective:       Patient ID: Walt Lopez is a 58 y.o. male.    Chief Complaint: Hospital Follow Up    HPI    Patient is a 58 year old white male with paroxysmal AFIB S/P Cardiac Ablation, Hypothyroidism and Prediabetes followed by Endocrinology Dr. Alexander, and Mixed Hyperlipidemia that is here today to HOSPITAL FOLLOW UP.    Hospital Follow up  Admitted 5/29/24-5/30/24 [facility]) for Aflutter  Patient felt "different" = went to [facility] for evaluation. + irregular rhythm  The patient was placed on amiodarone infusion and transferred to [facility] for cardiology evaluation  Initial labs were unremarkable with the exception of his 2nd troponin was 0.034. Patient was in AFib with RVR until he received 15 mg bolus of diltiazem per ED MD (Alvin) which resulted in him going into a flutter with CVR approximately 5 minutes after the bolus. Will start him on a 5 mg continuous diltiazem infusion, aggressively hydrate, and monitor on telemetry.   Hospital Course:   5/30-ef-patient is seen and examined at bedside.  Converted to normal sinus rhythm earlier this morning.  Cardiology evaluated patient and recommended 30 day course of Eliquis as CHADS-VASc is 0.  Additionally added on diltiazem 30 mg Q 8 hours, patient instructed to take an extra 30 mg if he starts to feel palpitations.  Patient cleared by Cardiology to discharge.  Patient to follow up with his electrophysiologist outpatient within 1-2 weeks, call to schedule as he sees them in [place].  Additionally given strict return precautions.  Patient in agreement.   Cardiology Discharge Follow Up:  Seen on 6/4/24 by [facility] Cardiologist Dr. Chaudhari progress notes:  ASSESSMENT  Plan  - Rare atrial fibrillation events  - Discussed option of possible pill in pocket strategy.  - Recommend stress testing prior to pursuing. Also echocardiogram for structural evaluation.  - Please schedule Lexiscan PET MPI for ischemic evaluation, atrial fibrillation. " Plans to initiate class Ic anterior drug.  - Echocardiogram for structural assessment given recurrent atrial fibrillation. Multiple prior ablations.  - Continue Eliquis 1 month after ER visit. Can transition to daily 81 mg aspirin.  - Discontinue current Cardizem  - Continue Synthroid for thyroid management, defer to endocrine  - Follow-up after stress, echo. Same day if ableIncreased BMI: Provide patient with information regarding diet and lifestyle changes.  Stress Test scheduled for 6/14/24  ECHO scheduled for 6/18/24  Dr. Chaudhari follow up scheduled for 6/18/24  Stable today      Paroxysmal AFIB  S/P cardiac ablation in past  Followed by Electrophysiology Dr. Argueta - last appt 9/15/2023  Recent episode 5/29/2024 requiring hospitalization  Seen on 6/4/24 by [facility] Cardiologist Dr. Chaudhari progress notes:  ASSESSMENT  Plan  - Rare atrial fibrillation events  - Discussed option of possible pill in pocket strategy.  - Recommend stress testing prior to pursuing. Also echocardiogram for structural evaluation.  - Please schedule Lexiscan PET MPI for ischemic evaluation, atrial fibrillation. Plans to initiate class Ic anterior drug.  - Echocardiogram for structural assessment given recurrent atrial fibrillation. Multiple prior ablations.  - Continue Eliquis 1 month after ER visit. Can transition to daily 81 mg aspirin.  - Discontinue current Cardizem  - Continue Synthroid for thyroid management, defer to endocrine  - Follow-up after stress, echo. Same day if ableIncreased BMI: Provide patient with information regarding diet and lifestyle changes.  Stress Test scheduled for 6/14/24  ECHO scheduled for 6/18/24  Dr. Chaudhari follow up scheduled for 6/18/24  Stable today      Hypothyroidism due to Hashimoto's  Followed by Endocrinology Dr. Alexander  Taking Synthroid 200 mcg daily  TSH 1.32 with Free T4 1.75 ion 5/31/24  TPO Ab 297  Next appt with Endocrinology 6/12/24          Prediabetes  HgbA1C 5.7% in May 2023  Currently  "5.6% 5/31/24 labs  Followed by Endocrinology Dr. Alexander        Mixed Hyperlipidemia  Total cholesterol 199 with  and HDL 42  States he is working on lifestyle modifications and diet   His levels are trending down and monitored by cardiologist        Obesity  Body mass index is 33.54 kg/m².  Working on lifestyle modifications                 Vitals:    06/06/24 1425   BP: 130/80   BP Location: Left arm   Patient Position: Sitting   BP Method: Large (Manual)   Pulse: 82   Temp: 97.9 °F (36.6 °C)   TempSrc: Temporal   SpO2: 98%   Weight: 115.3 kg (254 lb 3.1 oz)   Height: 6' 1" (1.854 m)       Assessment:         ICD-10-CM ICD-9-CM   1. Hospital discharge follow-up  Z09 V67.59   2. Paroxysmal atrial fibrillation with RVR  I48.0 427.31   3. Hypothyroidism due to Hashimoto's thyroiditis  E03.8 244.8    E06.3 245.2   4. Prediabetes  R73.03 790.29   5. Mixed hyperlipidemia  E78.2 272.2   6. Class 1 obesity due to excess calories with serious comorbidity and body mass index (BMI) of 33.0 to 33.9 in adult  E66.09 278.00    Z68.33 V85.33       Plan:       1. Hospital discharge follow-up  Overview:  Admitted 5/29/24-5/30/24 [facility] ([facility]) for Aflutter  Patient felt "different" = went to [facility] for evaluation. + irregular rhythm  The patient was placed on amiodarone infusion and transferred to [facility] for cardiology evaluation  Initial labs were unremarkable with the exception of his 2nd troponin was 0.034. Patient was in AFib with RVR until he received 15 mg bolus of diltiazem per ED MD (Alvin) which resulted in him going into a flutter with CVR approximately 5 minutes after the bolus. Will start him on a 5 mg continuous diltiazem infusion, aggressively hydrate, and monitor on telemetry.   Hospital Course:   5/30-ef-patient is seen and examined at bedside.  Converted to normal sinus rhythm earlier this morning.  Cardiology evaluated patient and recommended 30 day course of Eliquis as CHADS-VASc is 0.  " Additionally added on diltiazem 30 mg Q 8 hours, patient instructed to take an extra 30 mg if he starts to feel palpitations.  Patient cleared by Cardiology to discharge.  Patient to follow up with his electrophysiologist outpatient within 1-2 weeks, call to schedule as he sees them in [place].  Additionally given strict return precautions.  Patient in agreement.   Cardiology Discharge Follow Up:  Seen on 6/4/24 by [facility] Cardiologist Dr. Chaudhari progress notes:  ASSESSMENT  Plan  - Rare atrial fibrillation events  - Discussed option of possible pill in pocket strategy.  - Recommend stress testing prior to pursuing. Also echocardiogram for structural evaluation.  - Please schedule Lexiscan PET MPI for ischemic evaluation, atrial fibrillation. Plans to initiate class Ic anterior drug.  - Echocardiogram for structural assessment given recurrent atrial fibrillation. Multiple prior ablations.  - Continue Eliquis 1 month after ER visit. Can transition to daily 81 mg aspirin.  - Discontinue current Cardizem  - Continue Synthroid for thyroid management, defer to endocrine  - Follow-up after stress, echo. Same day if ableIncreased BMI: Provide patient with information regarding diet and lifestyle changes.  Stress Test scheduled for 6/14/24  ECHO scheduled for 6/18/24  Dr. Chaudhari follow up scheduled for 6/18/24  Stable today      2. Paroxysmal atrial fibrillation with RVR  Overview:  Paroxysmal AFIB  S/P cardiac ablation in past  Followed by Electrophysiology Dr. Argueta - last appt 9/15/2023  Recent episode 5/29/2024 requiring hospitalization  Seen on 6/4/24 by [facility] Cardiologist Dr. Chaudhari progress notes:  ASSESSMENT  Plan  - Rare atrial fibrillation events  - Discussed option of possible pill in pocket strategy.  - Recommend stress testing prior to pursuing. Also echocardiogram for structural evaluation.  - Please schedule Lexiscan PET MPI for ischemic evaluation, atrial fibrillation. Plans to initiate class Ic anterior  drug.  - Echocardiogram for structural assessment given recurrent atrial fibrillation. Multiple prior ablations.  - Continue Eliquis 1 month after ER visit. Can transition to daily 81 mg aspirin.  - Discontinue current Cardizem  - Continue Synthroid for thyroid management, defer to endocrine  - Follow-up after stress, echo. Same day if ableIncreased BMI: Provide patient with information regarding diet and lifestyle changes.  Stress Test scheduled for 6/14/24  ECHO scheduled for 6/18/24  Dr. Chaudhari follow up scheduled for 6/18/24  Stable today      3. Hypothyroidism due to Hashimoto's thyroiditis  Overview:  Hypothyroidism due to Hashimoto's  Followed by Endocrinology Dr. Alexander  Taking Synthroid 200 mcg daily  TSH 1.32 with Free T4 1.75 ion 5/31/24  TPO Ab 297  Next appt with Endocrinology 6/12/24        4. Prediabetes  Overview:  HgbA1C 5.7% in May 2023  Currently 5.6% 5/31/24 labs  Followed by Endocrinology Dr. Alexander        5. Mixed hyperlipidemia  Overview:  Total cholesterol 199 with  and HDL 42  States he is working on lifestyle modifications and diet   His levels are trending down and monitored by cardiologist        6. Class 1 obesity due to excess calories with serious comorbidity and body mass index (BMI) of 33.0 to 33.9 in adult  Overview:  Body mass index is 33.54 kg/m².  Working on lifestyle modifications         Follow up for keep appts with cardiology and endocrinology as scheduled..     Patient's Medications   New Prescriptions    No medications on file   Previous Medications    APIXABAN (ELIQUIS) 5 MG TAB    Take 1 tablet (5 mg total) by mouth 2 (two) times daily.    ASPIRIN (ECOTRIN) 81 MG EC TABLET    Take 81 mg by mouth once daily.    LORATADINE (CLARITIN) 10 MG TABLET    Take 10 mg by mouth once daily.    MULTIVIT-MIN/FA/LYCOPEN/LUTEIN (CENTRUM SILVER MEN ORAL)    Take 1 tablet by mouth once daily.    OMEGA-3 FATTY ACIDS-FISH -1,200 MG CPDR    Take 1,000 mg by mouth.     SYNTHROID 200 MCG TABLET    Take 200 mcg by mouth before breakfast.   Modified Medications    No medications on file   Discontinued Medications    DILTIAZEM (CARDIZEM) 30 MG TABLET    Take 1 tablet (30 mg total) by mouth every 8 (eight) hours.    FLUTICASONE PROPIONATE (FLONASE) 50 MCG/ACTUATION NASAL SPRAY    2 sprays (100 mcg total) by Each Nostril route once daily.    IBUPROFEN (ADVIL,MOTRIN) 600 MG TABLET    Take 1 tablet (600 mg total) by mouth 3 (three) times daily as needed (headacne).    LEVOTHYROXINE 200 MCG CAP    Take 200 mcg by mouth.          Review of Systems   Constitutional:  Negative for activity change and unexpected weight change.   HENT:  Negative for hearing loss, rhinorrhea and trouble swallowing.    Eyes:  Negative for discharge and visual disturbance.   Respiratory:  Negative for chest tightness and wheezing.    Cardiovascular:  Positive for palpitations. Negative for chest pain.   Gastrointestinal:  Negative for blood in stool, constipation, diarrhea and vomiting.   Endocrine: Negative for polydipsia and polyuria.   Genitourinary:  Negative for difficulty urinating, hematuria and urgency.   Musculoskeletal:  Positive for arthralgias. Negative for joint swelling and neck pain.   Neurological:  Negative for weakness and headaches.   Psychiatric/Behavioral:  Negative for confusion and dysphoric mood.          Objective:        Physical Exam  Constitutional:       General: He is not in acute distress.     Appearance: Normal appearance. He is obese. He is not toxic-appearing.      Comments: Body mass index is 33.54 kg/m².       HENT:      Head: Normocephalic and atraumatic.      Right Ear: Tympanic membrane, ear canal and external ear normal.      Left Ear: Tympanic membrane, ear canal and external ear normal.   Eyes:      Extraocular Movements: Extraocular movements intact.      Conjunctiva/sclera: Conjunctivae normal.   Cardiovascular:      Rate and Rhythm: Normal rate and regular rhythm.       Heart sounds: Normal heart sounds.   Pulmonary:      Effort: Pulmonary effort is normal. No respiratory distress.      Breath sounds: Normal breath sounds.   Abdominal:      General: Bowel sounds are normal. There is no distension.      Palpations: Abdomen is soft. There is no mass.      Tenderness: There is no abdominal tenderness. There is no guarding.      Hernia: No hernia is present.   Musculoskeletal:         General: Normal range of motion.      Right lower leg: No edema.      Left lower leg: No edema.   Skin:     General: Skin is warm and dry.   Neurological:      Mental Status: He is alert and oriented to person, place, and time.   Psychiatric:         Mood and Affect: Mood normal.         Behavior: Behavior normal.         Thought Content: Thought content normal.             Past Medical History:   Diagnosis Date    Atrial fibrillation     History of prior ablation treatment     Hypothyroidism     S/P ablation of atrial fibrillation        Past Surgical History:   Procedure Laterality Date    CARDIAC ELECTROPHYSIOLOGY STUDY AND ABLATION      COLONOSCOPY  12/22/2017    internal hemorrhoids, otherwise normal = repeat in 10 years - Dr. Cally Fuchs with [facility]    ESOPHAGOGASTRODUODENOSCOPY N/A 10/28/2021    Procedure: EGD (ESOPHAGOGASTRODUODENOSCOPY);  Surgeon: Patricia Soni MD;  Location: [facility];  Service: Endoscopy;  Laterality: N/A;    KNEE SURGERY Right     NASAL SEPTOPLASTY N/A 07/29/2021    Procedure: SEPTOPLASTY, NOSE;  Surgeon: Josué Villafuerte MD;  Location: [facility] OR [address] [place] [address];  Service: ENT;  Laterality: N/A;    NASAL TURBINATE REDUCTION Bilateral 07/29/2021    Procedure: REDUCTION, NASAL TURBINATE;  Surgeon: Josué Villafuerte MD;  Location: [facility] OR [address] [place] [address];  Service: ENT;  Laterality: Bilateral;    SHOULDER ARTHROSCOPY Right        Family History   Problem Relation Name Age of Onset    Hypertension Mother      Atrial fibrillation Mother      Cancer Father          esophageal cancer    No  Known Problems Brother         Social History     Socioeconomic History    Marital status:    Occupational History    Occupation:    Tobacco Use    Smoking status: Never     Passive exposure: Never    Smokeless tobacco: Never   Substance and Sexual Activity    Alcohol use: Yes     Comment: once a month or less    Drug use: Never    Sexual activity: Yes     Partners: Female     Social Determinants of Health     Financial Resource Strain: Low Risk  (6/5/2024)    Overall Financial Resource Strain (CARDIA)     Difficulty of Paying Living Expenses: Not hard at all   Food Insecurity: No Food Insecurity (6/5/2024)    Hunger Vital Sign     Worried About Running Out of Food in the Last Year: Never true     Ran Out of Food in the Last Year: Never true   Physical Activity: Sufficiently Active (6/5/2024)    Exercise Vital Sign     Days of Exercise per Week: 5 days     Minutes of Exercise per Session: 40 min   Stress: No Stress Concern Present (6/5/2024)    [employer] [employer] - Occupational Stress Questionnaire     Feeling of Stress : Not at all   Housing Stability: Unknown (6/5/2024)    Housing Stability Vital Sign     Unable to Pay for Housing in the Last Year: No

## 2024-06-06 NOTE — PROGRESS NOTES
Patient ID: Walt Lopez is a 58 y.o. y.o. male    Chief Complaint:   Chief Complaint   Patient presents with    Cerumen Impaction       Patient is self-referred for evaluation of a possible wax impaction in bilateral ears.  he has complaints of hearing loss in the affected ears, but denies pain or drainage.  This has been an issue in the past.  The patient has been using any sort of ear drop to soften the wax.  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 Denies tinnitus and dizziness.       Review of Systems   Constitutional: Negative for fever, chills, fatigue and unexpected weight change.   HENT: Positive for ear blockage. Negative for hearing loss, nosebleeds, congestion, sore throat, facial swelling, rhinorrhea, sneezing, trouble swallowing, dental problem, voice change, postnasal drip, sinus pressure, tinnitus and ear discharge.    Eyes: Negative for redness, itching and visual disturbance.   Respiratory: Negative for cough, choking and wheezing.    Cardiovascular: Negative for chest pain and palpitations.   Gastrointestinal: Negative for abdominal pain.        No reflux.   Musculoskeletal: Negative for gait problem.   Skin: Negative for rash.   Neurological: Negative for dizziness, light-headedness and headaches.     Past Medical History:   Diagnosis Date    Atrial fibrillation     History of prior ablation treatment     Hypothyroidism     S/P ablation of atrial fibrillation      Past Surgical History:   Procedure Laterality Date    CARDIAC ELECTROPHYSIOLOGY STUDY AND ABLATION      COLONOSCOPY  12/22/2017    internal hemorrhoids, otherwise normal = repeat in 10 years - Dr. Cally Fuchs with [facility]    ESOPHAGOGASTRODUODENOSCOPY N/A 10/28/2021    Procedure: EGD (ESOPHAGOGASTRODUODENOSCOPY);  Surgeon: Patricia Soni MD;  Location: [facility];  Service: Endoscopy;  Laterality: N/A;    KNEE SURGERY Right     NASAL SEPTOPLASTY N/A 07/29/2021    Procedure: SEPTOPLASTY,  NOSE;  Surgeon: Josué Villafuerte MD;  [facility];  Service: ENT;  Laterality: N/A;    NASAL TURBINATE REDUCTION Bilateral 07/29/2021    Procedure: REDUCTION, NASAL TURBINATE;  Surgeon: Josué Villafuerte MD;  [facility];  Service: ENT;  Laterality: Bilateral;    SHOULDER ARTHROSCOPY Right      Social History     Socioeconomic History    Marital status:    Occupational History    Occupation:    Tobacco Use    Smoking status: Never     Passive exposure: Never    Smokeless tobacco: Never   Substance and Sexual Activity    Alcohol use: Yes     Comment: once a month or less    Drug use: Never    Sexual activity: Yes     Partners: Female     Social Determinants of Health     Financial Resource Strain: Low Risk  (6/5/2024)    Overall Financial Resource Strain (CARDIA)     Difficulty of Paying Living Expenses: Not hard at all   Food Insecurity: No Food Insecurity (6/5/2024)    Hunger Vital Sign     Worried About Running Out of Food in the Last Year: Never true     Ran Out of Food in the Last Year: Never true   Physical Activity: Sufficiently Active (6/5/2024)    Exercise Vital Sign     Days of Exercise per Week: 5 days     Minutes of Exercise per Session: 40 min   Stress: No Stress Concern Present (6/5/2024)    [employer] [place] [employer] - Occupational Stress Questionnaire     Feeling of Stress : Not at all   Housing Stability: Unknown (6/5/2024)    Housing Stability Vital Sign     Unable to Pay for Housing in the Last Year: No     Family History   Problem Relation Name Age of Onset    Hypertension Mother      Atrial fibrillation Mother      Cancer Father          esophageal cancer    No Known Problems Brother         Objective:      Vitals:    06/06/24 1327   BP: (!) 146/87   Pulse: 82       Constitutional: Well appearing / communicating without difficutly.  NAD.  Eyes: EOM I Bilaterally  Head/Face: Normocephalic. Negative paranasal sinus pressure/tenderness.   Salivary glands WNL.  House Brackmann I Bilaterally.    Right Ear: Auricle normal appearance. External Auditory Canal within normal limits no lesions or masses,TM w/o masses/lesions/perforations. TM mobility noted.   Left Ear: Auricle normal appearance. External Auditory Canal within normal limits no lesions or masses,TM w/o masses/lesions/perforations. TM mobility noted.  Nose: No gross nasal septal deviation. Inferior Turbinates 3+ bilaterally. No septal perforation. No masses/lesions. External nasal skin appears normal without masses/lesions.   Oral Cavity: Gingiva/lips within normal limits.  Dentition/gingiva healthy appearing. Mucus membranes moist. Floor of mouth soft, no masses palpated. Oral Tongue mobile. Hard Palate appears normal.    Oropharynx: Base of tongue appears normal. No masses/lesions noted. Tonsillar fossa/pharyngeal wall without lesions. Posterior oropharynx WNL.  Soft palate without masses. Midline uvula.   Neck/Lymphatic: No LAD I-VI bilaterally.  No thyromegaly.  No masses noted on exam.    Mirror laryngoscopy/nasopharyngoscopy: Active gag reflex.  Unable to perform.    Neuro/Psychiatric: AOx3.  Normal mood and affect.   Cardiovascular: Normal carotid pulses bilaterally, no increasing jugular venous distention noted at cervical region bilaterally.    Respiratory: Normal respiratory effort, no stridor, no retractions noted.        Audiogram interpreted personally by me and discussed in detail with the patient today.   Pure tone testing revealed normal hearing, bilaterally. Speech reception thresholds were obtained at 15 dBHL in the right ear and 15 dBHL in the left ear. Speech discrimination scores were 100% in the right ear and 100% in the left ear. Tympanometry revealed Type A tympanograms in both ears.         Assessment:         ICD-10-CM ICD-9-CM    1. Abnormal auditory perception of both ears  H93.293 388.40       2. History of exposure to noise  Z77.122 V15.89            Plan:        -otoscopic exam revealed no cerumen impaction  -audiogram revealed normal hearing in bilateral ears  - We also discussed the use hearing protection when exposed to loud noise, including lawn equipment.   -follow up CRISTA Cazares NP    Answers submitted by the patient for this visit:  Review of Symptoms Questionnaire  (Submitted on 6/5/2024)  None of these: Yes  None of these : Yes  Snoring?: Yes  Irregular heartbeat?: Yes  None of these: Yes  None of these: Yes  None of these : Yes  Seasonal Allergies?: Yes  None of these : Yes  None of these: Yes  None of these: Yes  None of these: Yes

## 2024-06-07 PROBLEM — I48.4 ATYPICAL ATRIAL FLUTTER: Status: RESOLVED | Noted: 2021-02-06 | Resolved: 2024-06-07

## 2024-06-07 PROBLEM — Z09 HOSPITAL DISCHARGE FOLLOW-UP: Status: ACTIVE | Noted: 2024-06-07

## 2024-06-19 ENCOUNTER — PATIENT MESSAGE (OUTPATIENT)
Dept: CARDIOLOGY | Facility: CLINIC | Age: 59
End: 2024-06-19
Payer: COMMERCIAL

## 2024-06-21 ENCOUNTER — TELEPHONE (OUTPATIENT)
Dept: CARDIOLOGY | Facility: CLINIC | Age: 59
End: 2024-06-21
Payer: COMMERCIAL

## 2024-06-21 ENCOUNTER — OFFICE VISIT (OUTPATIENT)
Dept: CARDIOLOGY | Facility: CLINIC | Age: 59
End: 2024-06-21
Payer: COMMERCIAL

## 2024-06-21 VITALS
HEART RATE: 75 BPM | DIASTOLIC BLOOD PRESSURE: 88 MMHG | SYSTOLIC BLOOD PRESSURE: 132 MMHG | WEIGHT: 252 LBS | BODY MASS INDEX: 33.4 KG/M2 | HEIGHT: 73 IN | OXYGEN SATURATION: 98 %

## 2024-06-21 DIAGNOSIS — E03.8 HYPOTHYROIDISM DUE TO HASHIMOTO'S THYROIDITIS: ICD-10-CM

## 2024-06-21 DIAGNOSIS — E66.09 CLASS 1 OBESITY DUE TO EXCESS CALORIES WITH SERIOUS COMORBIDITY AND BODY MASS INDEX (BMI) OF 33.0 TO 33.9 IN ADULT: ICD-10-CM

## 2024-06-21 DIAGNOSIS — Z86.79 S/P ABLATION OF ATRIAL FLUTTER: ICD-10-CM

## 2024-06-21 DIAGNOSIS — I48.0 PAROXYSMAL ATRIAL FIBRILLATION WITH RVR: ICD-10-CM

## 2024-06-21 DIAGNOSIS — E78.2 MIXED HYPERLIPIDEMIA: Primary | ICD-10-CM

## 2024-06-21 DIAGNOSIS — E06.3 HYPOTHYROIDISM DUE TO HASHIMOTO'S THYROIDITIS: ICD-10-CM

## 2024-06-21 DIAGNOSIS — R73.03 PREDIABETES: ICD-10-CM

## 2024-06-21 DIAGNOSIS — Z98.890 S/P ABLATION OF ATRIAL FLUTTER: ICD-10-CM

## 2024-06-21 PROCEDURE — 99999 PR PBB SHADOW E&M-EST. PATIENT-LVL III: CPT | Mod: PBBFAC,,,

## 2024-06-21 PROCEDURE — 99204 OFFICE O/P NEW MOD 45 MIN: CPT | Mod: S$GLB,,,

## 2024-06-21 RX ORDER — FLECAINIDE ACETATE 150 MG/1
150 TABLET ORAL
COMMUNITY
Start: 2024-06-18

## 2024-06-21 RX ORDER — ROSUVASTATIN CALCIUM 10 MG/1
TABLET, FILM COATED ORAL
COMMUNITY
Start: 2024-06-18

## 2024-06-21 NOTE — TELEPHONE ENCOUNTER
----- Message from Eris Epperson sent at 6/21/2024  8:45 AM CDT -----  Regarding: self  Type: Patient Call Back    Who called:self    What is the request in detail:pt is asking to speak with the office about seeing if the dr murguia have privileges at [facility]     Can the clinic reply by [facility]?no    Would the patient rather a call back or a response via [facility]? callback    Best call back number:[phone]    Additional Information:

## 2024-06-21 NOTE — ASSESSMENT & PLAN NOTE
Body mass index is 33.25 kg/m². Morbid obesity complicates all aspects of disease management from diagnostic modalities to treatment. Weight loss encouraged and health benefits explained to patient.

## 2024-06-21 NOTE — ASSESSMENT & PLAN NOTE
- 5/30/24  -Continue - Crestor  10 mg   - patient request to remain on the lower dose despite elevated LDL

## 2024-06-21 NOTE — TELEPHONE ENCOUNTER
"----- Message from Asiya Topete sent at 6/21/2024  9:11 AM CDT -----  Type:  Pt Advice    Who Called:Pt   Does the patient know what this is regarding?: pt would like to know if this provider has "privileges" if he is admitted to [facility] for his heart palpitations. If not, would like any referrals to cardiologists with [facility] that does   Would the patient rather a call back or a response via [facility]? Call   Best Call Back Number: [phone]  Additional Information: Pt went to [facility] before for heart palpitations & there was no cardiologist with "privileges" to assist pt's disorder, so pt was transferred to [facility] hospital outside of [facility], so in result pt would like to establish with a provider that has "privileges" if he is admitted to [facility] in future. Or you have any referrals  "

## 2024-06-21 NOTE — ASSESSMENT & PLAN NOTE
Paroxysmal AFIB  S/P cardiac ablation in past  Followed by Electrophysiology Dr. Argueta - last appt 9/15/2023  Recent episode 5/29/2024 requiring hospitalization  Seen on 6/4/24 by [facility] Cardiologist Dr. Chaudhari progress notes:  ASSESSMENT  Plan  - Rare atrial fibrillation events  - Discussed option of possible pill in pocket strategy.  - Recommend stress testing prior to pursuing. Also echocardiogram for structural evaluation.  - Please schedule Lexiscan PET MPI for ischemic evaluation, atrial fibrillation. Plans to initiate class Ic anterior drug.  - Echocardiogram for structural assessment given recurrent atrial fibrillation. Multiple prior ablations.  - Continue Eliquis 1 month after ER visit. Can transition to daily 81 mg aspirin.  - Discontinue current Cardizem  - Continue Synthroid for thyroid management, defer to endocrine  - Follow-up after stress, echo. Same day if ableIncreased BMI: Provide patient with information regarding diet and lifestyle changes.  Stress Test scheduled for 6/14/24  ECHO scheduled for 6/18/24  Dr. Chaudhari follow up scheduled for 6/18/24  Stable today    -Will continue to follow with Dr. Almanza - PET[facility] av[facility]

## 2024-06-21 NOTE — PROGRESS NOTES
"Subjective:    Patient ID:  Walt Lopez is a 58 y.o. male who presents for evaluation of No chief complaint on file.      PCP: Love Carlisle NP     Referring Provider:     HPI: Patient is a 58 year old white male with paroxysmal AFIB S/P Cardiac Ablation, Hypothyroidism and Prediabetes followed by Endocrinology Dr. Alexander, and Mixed Hyperlipidemia that is here today to HOSPITAL FOLLOW UP and establish care for general cardiology. He plans to continue EP services with [facility] EP services Dr. Almanza.       Hospital Follow up  Admitted 5/29/24-5/30/[place] for [place]  Patient felt "different" = went to [facility] for evaluation. + irregular rhythm  The patient was placed on amiodarone infusion and transferred to [facility] for cardiology evaluation  Initial labs were unremarkable with the exception of his 2nd troponin was 0.034. Patient was in AFib with RVR until he received 15 mg bolus of diltiazem per ED MD (Alvin) which resulted in him going into a flutter with CVR approximately 5 minutes after the bolus. Will start him on a 5 mg continuous diltiazem infusion, aggressively hydrate, and monitor on telemetry.     Hospital Course:   5/30-ef-patient is seen and examined at bedside.  Converted to normal sinus rhythm earlier this morning.  Cardiology evaluated patient and recommended 30 day course of Eliquis as CHADS-VASc is 0.  Additionally added on diltiazem 30 mg Q 8 hours, patient instructed to take an extra 30 mg if he starts to feel palpitations.  Patient cleared by Cardiology to discharge.  Patient to follow up with his electrophysiologist outpatient within 1-2 weeks, call to schedule as he sees them in [place].  Additionally given strict return precautions.  Patient in agreement.     Cardiology Discharge Follow Up:  Seen on 6/4/24 by [facility] Cardiologist Dr. Chaudhari progress notes:  ASSESSMENT  Plan  - Rare atrial fibrillation events  - Discussed option of possible pill in pocket " strategy.  - Recommend stress testing prior to pursuing. Also echocardiogram for structural evaluation.  - Please schedule Lexiscan PET MPI for ischemic evaluation, atrial fibrillation. Plans to initiate class Ic anterior drug.  - Echocardiogram for structural assessment given recurrent atrial fibrillation. Multiple prior ablations.  - Continue Eliquis 1 month after ER visit. Can transition to daily 81 mg aspirin.  - Discontinue current Cardizem  - Continue Synthroid for thyroid management, defer to endocrine  - Follow-up after stress, echo. Same day if ableIncreased BMI: Provide patient with information regarding diet and lifestyle changes.  Stress Test scheduled for 6/14/24  ECHO scheduled for 6/18/24  Dr. Chaudhari follow up scheduled for 6/18/24  Stable today    Paroxysmal AFIB  S/P cardiac ablation in past  Followed by Electrophysiology Dr. Argueta - last appt 9/15/2023  Recent episode 5/29/2024 requiring hospitalization  Seen on 6/4/24 by [facility] Cardiologist Dr. Chaudhari progress notes:  ASSESSMENT  Plan  - Rare atrial fibrillation events  - Discussed option of possible pill in pocket strategy.  - Recommend stress testing prior to pursuing. Also echocardiogram for structural evaluation.  - Please schedule Lexiscan PET MPI for ischemic evaluation, atrial fibrillation. Plans to initiate class Ic anterior drug.  - Echocardiogram for structural assessment given recurrent atrial fibrillation. Multiple prior ablations.  - Continue Eliquis 1 month after ER visit. Can transition to daily 81 mg aspirin.  - Discontinue current Cardizem  - Continue Synthroid for thyroid management, defer to endocrine  - Follow-up after stress, echo. Same day if ableIncreased BMI: Provide patient with information regarding diet and lifestyle changes.  Stress Test scheduled for 6/14/24  ECHO scheduled for 6/18/24  Dr. Chaudhari follow up scheduled for 6/18/24  Stable today        Patient denies CP, SOB, palpitations, orthopnea, PND, presyncope,  LOC, swelling, or claudication. Patient monitors home BP and ranges 130s. Patient reports medication compliance without side effects  Patient does exercise regularly and cut his grass without limitations.        Past Medical History:   Diagnosis Date    Atrial fibrillation     History of prior ablation treatment     Hypothyroidism     S/P ablation of atrial fibrillation      Past Surgical History:   Procedure Laterality Date    CARDIAC ELECTROPHYSIOLOGY STUDY AND ABLATION      COLONOSCOPY  12/22/2017    internal hemorrhoids, otherwise normal = repeat in 10 years - Dr. Cally Fuchs with [facility]    ESOPHAGOGASTRODUODENOSCOPY N/A 10/28/2021    Procedure: EGD (ESOPHAGOGASTRODUODENOSCOPY);  Surgeon: Patricia Soni MD;  Location: [facility] END[facility];  Service: Endoscopy;  Laterality: N/A;    KNEE SURGERY Right     NASAL SEPTOPLASTY N/A 07/29/2021    Procedure: SEPTOPLASTY, NOSE;  Surgeon: Josué Villafuerte MD;  [facility];  Service: ENT;  Laterality: N/A;    NASAL TURBINATE REDUCTION Bilateral 07/29/2021    Procedure: REDUCTION, NASAL TURBINATE;  Surgeon: Josué Villafuerte MD;  [facility];  Service: ENT;  Laterality: Bilateral;    SHOULDER ARTHROSCOPY Right      Social History     Socioeconomic History    Marital status:    Occupational History    Occupation:    Tobacco Use    Smoking status: Never     Passive exposure: Never    Smokeless tobacco: Never   Substance and Sexual Activity    Alcohol use: Yes     Comment: once a month or less    Drug use: Never    Sexual activity: Yes     Partners: Female     Social Determinants of Health     Financial Resource Strain: Low Risk  (6/5/2024)    Overall Financial Resource Strain (CARDIA)     Difficulty of Paying Living Expenses: Not hard at all   Food Insecurity: No Food Insecurity (6/5/2024)    Hunger Vital Sign     Worried About Running Out of Food in the Last Year: Never true     Ran Out of Food in the Last Year: Never true   Physical  Activity: Sufficiently Active (6/5/2024)    Exercise Vital Sign     Days of Exercise per Week: 5 days     Minutes of Exercise per Session: 40 min   Stress: No Stress Concern Present (6/5/2024)    [employer] [place] - Occupational Stress Questionnaire     Feeling of Stress : Not at all   Housing Stability: Unknown (6/5/2024)    Housing Stability Vital Sign     Unable to Pay for Housing in the Last Year: No     Family History   Problem Relation Name Age of Onset    Hypertension Mother      Atrial fibrillation Mother      Cancer Father          esophageal cancer    No Known Problems Brother         Review of patient's allergies indicates:  No Known Allergies    Medication List with Changes/Refills   Current Medications    APIXABAN (ELIQUIS) 5 MG TAB    Take 1 tablet (5 mg total) by mouth 2 (two) times daily.    ASPIRIN (ECOTRIN) 81 MG EC TABLET    Take 81 mg by mouth once daily.    CRESTOR 10 MG TABLET    Crestor 10 mg tablet, [RxNorm: 794575]    FLECAINIDE (TAMBOCOR) 150 MG TAB    Take 150 mg by mouth as needed.    LORATADINE (CLARITIN) 10 MG TABLET    Take 10 mg by mouth once daily.    MULTIVIT-MIN/FA/LYCOPEN/LUTEIN (CENTRUM SILVER MEN ORAL)    Take 1 tablet by mouth once daily.    OMEGA-3 FATTY ACIDS-FISH -1,200 MG CPDR    Take 1,000 mg by mouth.    SYNTHROID 200 MCG TABLET    Take 200 mcg by mouth before breakfast.       Review of Systems   Constitutional: Negative for diaphoresis and fever.   HENT:  Negative for congestion and hearing loss.    Eyes:  Negative for blurred vision and pain.   Cardiovascular:  Negative for chest pain, claudication, dyspnea on exertion, leg swelling, near-syncope, palpitations and syncope.   Respiratory:  Negative for shortness of breath and sleep disturbances due to breathing.    Hematologic/Lymphatic: Negative for bleeding problem. Does not bruise/bleed easily.   Skin:  Negative for color change and poor wound healing.   Gastrointestinal:  Negative for  "abdominal pain and nausea.   Genitourinary:  Negative for bladder incontinence and flank pain.   Neurological:  Negative for focal weakness and light-headedness.        Objective:   /88 (BP Location: Left arm, Patient Position: Sitting, BP Method: Large (Manual))   Pulse 75   Ht 6' 1" (1.854 m)   Wt 114.3 kg (251 lb 15.8 oz)   SpO2 98%   BMI 33.25 kg/m²    Physical Exam  Constitutional:       Appearance: He is well-developed. He is not diaphoretic.   HENT:      Head: Normocephalic and atraumatic.   Eyes:      General: No scleral icterus.     Pupils: Pupils are equal, round, and reactive to light.   Neck:      Vascular: No JVD.   Cardiovascular:      Rate and Rhythm: Normal rate and regular rhythm.      Pulses: Intact distal pulses.      Heart sounds: S1 normal and S2 normal. No murmur heard.     No friction rub. No gallop.   Pulmonary:      Effort: Pulmonary effort is normal. No respiratory distress.      Breath sounds: Normal breath sounds. No wheezing or rales.   Chest:      Chest wall: No tenderness.   Abdominal:      General: Bowel sounds are normal. There is no distension.      Palpations: Abdomen is soft. There is no mass.      Tenderness: There is no abdominal tenderness. There is no rebound.   Musculoskeletal:         General: No tenderness. Normal range of motion.      Cervical back: Normal range of motion and neck supple.   Skin:     General: Skin is warm and dry.      Coloration: Skin is not pale.   Neurological:      Mental Status: He is alert and oriented to person, place, and time.      Coordination: Coordination normal.      Deep Tendon Reflexes: Reflexes normal.   Psychiatric:         Behavior: Behavior normal.         Judgment: Judgment normal.           Assessment:       1. Mixed hyperlipidemia    2. Paroxysmal atrial fibrillation with RVR    3. S/P ablation of atrial flutter    4. Class 1 obesity due to excess calories with serious comorbidity and body mass index (BMI) of 33.0 to 33.9 " in adult    5. Hypothyroidism due to Hashimoto's thyroiditis         Plan:         Mixed hyperlipidemia  - 5/30/24  -Continue - Crestor  10 mg   - patient request to remain on the lower dose despite elevated LDL       Paroxysmal atrial fibrillation with RVR  Paroxysmal AFIB  S/P cardiac ablation in past  Followed by Electrophysiology Dr. Argueta - last appt 9/15/2023  Recent episode 5/29/2024 requiring hospitalization  Seen on 6/4/24 by [facility] Cardiologist Dr. Chaudhari progress notes:  ASSESSMENT  Plan  - Rare atrial fibrillation events  - Discussed option of possible pill in pocket strategy.  - Recommend stress testing prior to pursuing. Also echocardiogram for structural evaluation.  - Please schedule Lexiscan PET MPI for ischemic evaluation, atrial fibrillation. Plans to initiate class Ic anterior drug.  - Echocardiogram for structural assessment given recurrent atrial fibrillation. Multiple prior ablations.  - Continue Eliquis 1 month after ER visit. Can transition to daily 81 mg aspirin.  - Discontinue current Cardizem  - Continue Synthroid for thyroid management, defer to endocrine  - Follow-up after stress, echo. Same day if ableIncreased BMI: Provide patient with information regarding diet and lifestyle changes.  Stress Test scheduled for 6/14/24  ECHO scheduled for 6/18/24  Dr. Chaudhari follow up scheduled for 6/18/24  Stable today    -Will continue to follow with Dr. Almanza - EP w [facility]     S/P ablation of atrial flutter  -Obtain records from  [facility]- EP     Class 1 obesity due to excess calories with body mass index (BMI) of 33.0 to 33.9 in adult  Body mass index is 33.25 kg/m². Morbid obesity complicates all aspects of disease management from diagnostic modalities to treatment. Weight loss encouraged and health benefits explained to patient.       Hypothyroidism  - Followed by PCP  -continue medical therapy       Total duration of face to face visit time 30 minutes.  Total time spent counseling greater than  fifty percent of total visit time.  Counseling included discussion regarding imaging findings, diagnosis, possibilities, treatment options, risks and benefits.  The patient had many questions regarding the options and long-term effects      He Gonsales DNP  Cardiology

## 2024-08-09 ENCOUNTER — PATIENT MESSAGE (OUTPATIENT)
Dept: FAMILY MEDICINE | Facility: CLINIC | Age: 59
End: 2024-08-09
Payer: COMMERCIAL

## 2024-08-29 ENCOUNTER — PATIENT MESSAGE (OUTPATIENT)
Dept: CARDIOLOGY | Facility: CLINIC | Age: 59
End: 2024-08-29
Payer: COMMERCIAL

## 2024-09-04 ENCOUNTER — OFFICE VISIT (OUTPATIENT)
Dept: OTOLARYNGOLOGY | Facility: CLINIC | Age: 59
End: 2024-09-04
Payer: COMMERCIAL

## 2024-09-04 VITALS
SYSTOLIC BLOOD PRESSURE: 144 MMHG | BODY MASS INDEX: 34.22 KG/M2 | WEIGHT: 259.38 LBS | DIASTOLIC BLOOD PRESSURE: 77 MMHG | HEART RATE: 72 BPM

## 2024-09-04 DIAGNOSIS — J30.89 NON-SEASONAL ALLERGIC RHINITIS, UNSPECIFIED TRIGGER: Primary | ICD-10-CM

## 2024-09-04 DIAGNOSIS — J34.3 NASAL TURBINATE HYPERTROPHY: ICD-10-CM

## 2024-09-04 PROCEDURE — 99999 PR PBB SHADOW E&M-EST. PATIENT-LVL III: CPT | Mod: PBBFAC,,, | Performed by: NURSE PRACTITIONER

## 2024-09-04 PROCEDURE — 99214 OFFICE O/P EST MOD 30 MIN: CPT | Mod: S$GLB,,, | Performed by: NURSE PRACTITIONER

## 2024-09-04 RX ORDER — CETIRIZINE HYDROCHLORIDE 10 MG/1
10 TABLET ORAL DAILY
Qty: 30 TABLET | Refills: 11 | Status: SHIPPED | OUTPATIENT
Start: 2024-09-04 | End: 2025-09-04

## 2024-09-04 RX ORDER — FLUTICASONE PROPIONATE 50 MCG
2 SPRAY, SUSPENSION (ML) NASAL DAILY
Qty: 9.9 ML | Refills: 11 | Status: SHIPPED | OUTPATIENT
Start: 2024-09-04

## 2024-09-04 NOTE — PROGRESS NOTES
Patient ID: Walt Lopez is a 58 y.o. y.o. male    Chief Complaint:   Chief Complaint   Patient presents with    Sinus Problem     HPI 6/6/2024: Patient is self-referred for evaluation of a possible wax impaction in bilateral ears.  he has complaints of hearing loss in the affected ears, but denies pain or drainage.  This has been an issue in the past.  The patient has been using any sort of ear drop to soften the wax.  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 Denies tinnitus and dizziness.     Interval HPI 9/4/2024:  Mr. Lopez is here with c/o nasal congestion for three months. He has been using the nasal saline flushes and Astepro along with taking Claritin. Denies runny nose, sinus pressure and headaches.   The patient with hx of chronic sinusitis and papilloma of nose. He is s/p endoscopic sinus surgery with Dr. Villafuerte in 2021.       Review of Systems   Constitutional: Negative for fever, chills, fatigue and unexpected weight change.   HENT: Positive for ear blockage. Negative for hearing loss, nosebleeds, congestion, sore throat, facial swelling, rhinorrhea, sneezing, trouble swallowing, dental problem, voice change, postnasal drip, sinus pressure, tinnitus and ear discharge.    Eyes: Negative for redness, itching and visual disturbance.   Respiratory: Negative for cough, choking and wheezing.    Cardiovascular: Negative for chest pain and palpitations.   Gastrointestinal: Negative for abdominal pain.        No reflux.   Musculoskeletal: Negative for gait problem.   Skin: Negative for rash.   Neurological: Negative for dizziness, light-headedness and headaches.     Past Medical History:   Diagnosis Date    Atrial fibrillation     History of prior ablation treatment     Hypothyroidism     S/P ablation of atrial fibrillation      Past Surgical History:   Procedure Laterality Date    CARDIAC ELECTROPHYSIOLOGY STUDY AND ABLATION      COLONOSCOPY  12/22/2017    internal  hemorrhoids, otherwise normal = repeat in 10 years - Dr. Cally Fuchs with [facility]    ESOPHAGOGASTRODUODENOSCOPY N/A 10/28/2021    Procedure: EGD (ESOPHAGOGASTRODUODENOSCOPY);  Surgeon: Patricia Soni MD;  Location: [facility];  Service: Endoscopy;  Laterality: N/A;    KNEE SURGERY Right     NASAL SEPTOPLASTY N/A 07/29/2021    Procedure: SEPTOPLASTY, NOSE;  Surgeon: Josué Villafuerte MD;  Location: [facility] OR [address] [place] [address];  Service: ENT;  Laterality: N/A;    NASAL TURBINATE REDUCTION Bilateral 07/29/2021    Procedure: REDUCTION, NASAL TURBINATE;  Surgeon: Josué Villafuerte MD;  Location: [facility] OR [address] [place] [address];  Service: ENT;  Laterality: Bilateral;    SHOULDER ARTHROSCOPY Right      Social History     Socioeconomic History    Marital status:    Occupational History    Occupation:    Tobacco Use    Smoking status: Never     Passive exposure: Never    Smokeless tobacco: Never   Substance and Sexual Activity    Alcohol use: Yes     Comment: once a month or less    Drug use: Never    Sexual activity: Yes     Partners: Female     Social Determinants of Health     Financial Resource Strain: Low Risk  (6/5/2024)    Overall Financial Resource Strain (CARDIA)     Difficulty of Paying Living Expenses: Not hard at all   Food Insecurity: No Food Insecurity (6/5/2024)    Hunger Vital Sign     Worried About Running Out of Food in the Last Year: Never true     Ran Out of Food in the Last Year: Never true   Physical Activity: Sufficiently Active (6/5/2024)    Exercise Vital Sign     Days of Exercise per Week: 5 days     Minutes of Exercise per Session: 40 min   Stress: No Stress Concern Present (6/5/2024)    [employer] - Occupational Stress Questionnaire     Feeling of Stress : Not at all   Housing Stability: Unknown (6/5/2024)    Housing Stability Vital Sign     Unable to Pay for Housing in the Last Year: No     Family History   Problem Relation Name Age of Onset    Hypertension Mother       Atrial fibrillation Mother      Cancer Father          esophageal cancer    No Known Problems Brother         Objective:      Vitals:    09/04/24 1336   BP: (!) 144/77   Pulse: 72         Constitutional: Well appearing / communicating without difficutly.  NAD.  Eyes: EOM I Bilaterally  Head/Face: Normocephalic. Negative paranasal sinus pressure/tenderness.  Salivary glands WNL.  House Brackmann I Bilaterally.    Right Ear: Auricle normal appearance. External Auditory Canal within normal limits no lesions or masses,TM w/o masses/lesions/perforations. TM mobility noted.   Left Ear: Auricle normal appearance. External Auditory Canal within normal limits no lesions or masses,TM w/o masses/lesions/perforations. TM mobility noted.  Nose: No gross nasal septal deviation. Inferior Turbinates 3+ bilaterally. No septal perforation. No masses/lesions. External nasal skin appears normal without masses/lesions.   Oral Cavity: Gingiva/lips within normal limits.  Dentition/gingiva healthy appearing. Mucus membranes moist. Floor of mouth soft, no masses palpated. Oral Tongue mobile. Hard Palate appears normal.    Oropharynx: Base of tongue appears normal. No masses/lesions noted. Tonsillar fossa/pharyngeal wall without lesions. Posterior oropharynx WNL.  Soft palate without masses. Midline uvula.   Neck/Lymphatic: No LAD I-VI bilaterally.  No thyromegaly.  No masses noted on exam.    Mirror laryngoscopy/nasopharyngoscopy: Active gag reflex.  Unable to perform.    Neuro/Psychiatric: AOx3.  Normal mood and affect.   Cardiovascular: Normal carotid pulses bilaterally, no increasing jugular venous distention noted at cervical region bilaterally.    Respiratory: Normal respiratory effort, no stridor, no retractions noted.      Audiogram interpreted personally by me and discussed in detail with the patient today.           Assessment:         ICD-10-CM ICD-9-CM    1. Non-seasonal allergic rhinitis, unspecified trigger  J30.89 477.8        2. Nasal turbinate hypertrophy  J34.3 478.0              Plan:       -start on Flonase 2 sprays in each nostril daily  -start on cetirizine 10 mg daily. Stop Claritin  -continue on azelastine nasal spray  -continue with nasal saline rinses  -follow up 1 year or sooner if no improvement      Leonie Cazares NP        Answers submitted by the patient for this visit:  Review of Symptoms Questionnaire  (Submitted on 9/4/2024)  None of these: Yes  sinus pressure : Yes  Sinus infection(s)?: Yes  None of these : Yes  Snoring?: Yes  None of these : Yes  heartburn: Yes  Acid Reflux?: Yes  None of these: Yes  None of these : Yes  None of these: Yes  None of these : Yes  None of these: Yes  None of these: Yes  None of these: Yes

## 2024-09-05 ENCOUNTER — OFFICE VISIT (OUTPATIENT)
Dept: FAMILY MEDICINE | Facility: CLINIC | Age: 59
End: 2024-09-05
Payer: COMMERCIAL

## 2024-09-05 VITALS
HEIGHT: 72 IN | OXYGEN SATURATION: 97 % | HEART RATE: 77 BPM | TEMPERATURE: 98 F | BODY MASS INDEX: 34.95 KG/M2 | SYSTOLIC BLOOD PRESSURE: 140 MMHG | DIASTOLIC BLOOD PRESSURE: 81 MMHG | WEIGHT: 258.06 LBS

## 2024-09-05 DIAGNOSIS — Z86.79 S/P ABLATION OF ATRIAL FLUTTER: ICD-10-CM

## 2024-09-05 DIAGNOSIS — E06.3 HYPOTHYROIDISM DUE TO HASHIMOTO'S THYROIDITIS: ICD-10-CM

## 2024-09-05 DIAGNOSIS — E78.2 MIXED HYPERLIPIDEMIA: ICD-10-CM

## 2024-09-05 DIAGNOSIS — E03.8 HYPOTHYROIDISM DUE TO HASHIMOTO'S THYROIDITIS: ICD-10-CM

## 2024-09-05 DIAGNOSIS — E66.01 CLASS 2 SEVERE OBESITY DUE TO EXCESS CALORIES WITH SERIOUS COMORBIDITY AND BODY MASS INDEX (BMI) OF 35.0 TO 35.9 IN ADULT: Primary | ICD-10-CM

## 2024-09-05 DIAGNOSIS — I48.0 PAROXYSMAL ATRIAL FIBRILLATION WITH RVR: ICD-10-CM

## 2024-09-05 DIAGNOSIS — Z98.890 S/P ABLATION OF ATRIAL FLUTTER: ICD-10-CM

## 2024-09-05 DIAGNOSIS — R73.03 PREDIABETES: ICD-10-CM

## 2024-09-05 PROBLEM — Z09 HOSPITAL DISCHARGE FOLLOW-UP: Status: RESOLVED | Noted: 2024-06-07 | Resolved: 2024-09-05

## 2024-09-05 PROBLEM — E66.812 CLASS 2 SEVERE OBESITY DUE TO EXCESS CALORIES WITH SERIOUS COMORBIDITY AND BODY MASS INDEX (BMI) OF 35.0 TO 35.9 IN ADULT: Status: ACTIVE | Noted: 2024-09-05

## 2024-09-05 PROCEDURE — 99999 PR PBB SHADOW E&M-EST. PATIENT-LVL IV: CPT | Mod: PBBFAC,,, | Performed by: NURSE PRACTITIONER

## 2024-09-05 PROCEDURE — 99214 OFFICE O/P EST MOD 30 MIN: CPT | Mod: S$GLB,,, | Performed by: NURSE PRACTITIONER

## 2024-09-05 RX ORDER — ROSUVASTATIN CALCIUM 20 MG/1
20 TABLET, COATED ORAL NIGHTLY
COMMUNITY
Start: 2024-07-13

## 2024-09-05 RX ORDER — TIRZEPATIDE 2.5 MG/.5ML
2.5 INJECTION, SOLUTION SUBCUTANEOUS
Qty: 4 PEN | Refills: 0 | Status: SHIPPED | OUTPATIENT
Start: 2024-09-05

## 2024-09-05 NOTE — PROGRESS NOTES
Subjective:       Patient ID: Walt Lopez is a 58 y.o. male.    Chief Complaint: Weight Loss        HPI WITH ASSESSMENT AND PLAN OF CARE:      Patient is a 58 year old white male with paroxysmal AFIB S/P Cardiac Ablation, Hypothyroidism and Prediabetes followed by Endocrinology Dr. Alexander, and Mixed Hyperlipidemia that is here today to talk about weight loss.         Paroxysmal AFIB  S/P cardiac ablation in past  Followed by Electrophysiology Dr. Argueta - last appt 9/15/2023  Recent episode 5/29/2024 requiring hospitalization  Transferred to [facility] and followed by Electrophysiologist Dr. Chaudhari  F/U on 6/17/24 by [facility] Electrophysiologist Dr. Chaudhari progress notes:  ASSESSMENT  Plan  - Testing results reviewed patient  Stress test with no perfusion abnormalities appreciated.  - Prior coronary calcium scoring 6.  - Mild elevated LDL  - Advised Crestor 10 mg daily, new prescription to be provided.  - PAF, will initiate pill in pocket strategy. Flecainide 300mg to be taken as needed for atrial fibrillation recurrence. Advised patient to present to ER for evaluation prior to taking.  - Patient can also utilize diltiazem for aid in heart rate control should events recur.  - Will repeat coronary calcium scoring, patient request performing an [facility] hospital.  - Also plan to repeat cholesterol panel 3 months, [facility]  - Plan to notify patient of results  - Follow-up 6 months, sooner if neededIncreased BMI: Provide patient with information regarding diet and lifestyle changes.         Prediabetes  HgbA1C 5.7% in May 2023  Currently 5.6% 5/31/24 labs  Followed by Endocrinology Dr. Alexander          Mixed Hyperlipidemia  Total cholesterol 199 with  and HDL 42  Currently taking Rosuvastatin 20 mg daily.  States he is working on lifestyle modifications and diet   His levels are trending down and monitored by cardiologist            Hypothyroidism due to  Hashimoto's  Followed by Endocrinology Dr. Alexander  Last appt with Endocrinology 6/12/24 POC:  Refill Synthroid Tablet, 200 MCG, 1 tablet in the morning on an empty stomach, Oral, 1 tab M- Sat and 1/2 on Sunday, 90 days, 90, Refills 3.         LAB: Thyroid Peroxidase (TPO) [id number]       LAB: CBC, Platelet, No [id number]       LAB: PSA, [id number]       LAB: Comp. Metabolic Panel [id number]       LAB: Lipid Panel-[id number]       LAB: TSH+T4F+[id number]       LAB: Hemoglobin [id number]  Notes: stable  1. decrease Synthroid 200 mcg daily - MALOU, M- Sat and 1/2 on Sunday  2. discussed how to take  3. monitor free t4     4.  Repeat labs and follow up in 6 months.        Obesity  Body mass index is 35 kg/m².  Working on lifestyle modifications  Would like to try Zepbound injections for weight loss.   Denies personal and family history on pancreatitis or thyroid cancer.   Will start on 2.5mg for 4 weeks, then increase to .5 for 4 weeks  Encouraged to continue working on diet and exercise.   Will follow-up in 8 weeks.        Vitals:    09/05/24 1408   BP: (!) 140/81   BP Location: Right arm   Patient Position: Sitting   BP Method: Large (Manual)   Pulse: 77   Temp: 97.7 °F (36.5 °C)   SpO2: 97%   Weight: 117 kg (258 lb 0.8 oz)   Height: 6' (1.829 m)         Diagnoses this Encounter:         ICD-10-CM ICD-9-CM   1. Class 2 severe obesity due to excess calories with serious comorbidity and body mass index (BMI) of 35.0 to 35.9 in adult  E66.01 278.01    Z68.35 V85.35   2. Prediabetes  R73.03 790.29   3. Hypothyroidism due to Hashimoto's thyroiditis  E03.8 244.8    E06.3 245.2   4. Mixed hyperlipidemia  E78.2 272.2   5. Paroxysmal atrial fibrillation with RVR  I48.0 427.31   6. S/P ablation of atrial flutter  Z98.890 V45.89    Z86.79        Orders Placed This Encounter    tirzepatide, weight loss, (ZEPBOUND) 2.5 mg/0.5 mL PnIj        Follow up in about 8 weeks (around 10/31/2024) for follow-up weight loss.      Patient's Medications   New Prescriptions    TIRZEPATIDE, WEIGHT LOSS, (ZEPBOUND) 2.5 MG/0.5 ML PNIJ    Inject 2.5 mg into the skin every 7 days.   Previous Medications    ASPIRIN (ECOTRIN) 81 MG EC TABLET    Take 81 mg by mouth once daily.    CETIRIZINE (ZYRTEC) 10 MG TABLET    Take 1 tablet (10 mg total) by mouth once daily.    FLECAINIDE (TAMBOCOR) 150 MG TAB    Take 150 mg by mouth as needed.    FLUTICASONE PROPIONATE (FLONASE) 50 MCG/ACTUATION NASAL SPRAY    2 sprays (100 mcg total) by Each Nostril route once daily.    MULTIVIT-MIN/FA/LYCOPEN/LUTEIN (CENTRUM SILVER MEN ORAL)    Take 1 tablet by mouth once daily.    OMEGA-3 FATTY ACIDS-FISH -1,200 MG CPDR    Take 1,000 mg by mouth.    ROSUVASTATIN (CRESTOR) 20 MG TABLET    Take 20 mg by mouth every evening.    SYNTHROID 200 MCG TABLET    Take 200 mcg by mouth before breakfast.   Modified Medications    No medications on file   Discontinued Medications    CRESTOR 10 MG TABLET    Crestor 10 mg tablet, [RxNorm: [id number]]         Review of Systems   Constitutional:  Negative for chills and fever.   Respiratory:  Negative for shortness of breath.    Cardiovascular:  Negative for chest pain.   Gastrointestinal:  Negative for diarrhea, nausea and vomiting.         Objective:        Physical Exam  Constitutional:       General: He is not in acute distress.     Appearance: Normal appearance. He is obese. He is not ill-appearing.   HENT:      Right Ear: Tympanic membrane normal.      Left Ear: Tympanic membrane normal.      Nose: Nose normal.      Mouth/Throat:      Mouth: Mucous membranes are moist.      Pharynx: Oropharynx is clear.   Eyes:      Conjunctiva/sclera: Conjunctivae normal.   Cardiovascular:      Rate and Rhythm: Normal rate and regular rhythm.      Heart sounds: Normal heart sounds.   Pulmonary:      Effort: Pulmonary effort is normal. No respiratory distress.      Breath sounds: Normal breath sounds. No wheezing.   Abdominal:      General: Bowel  sounds are normal. There is no distension.      Palpations: Abdomen is soft. There is no mass.      Tenderness: There is no abdominal tenderness.   Musculoskeletal:      Cervical back: Neck supple.      Right lower leg: No edema.      Left lower leg: No edema.   Skin:     General: Skin is warm and dry.   Neurological:      Mental Status: He is alert and oriented to person, place, and time.   Psychiatric:         Mood and Affect: Mood normal.         Behavior: Behavior normal.         Thought Content: Thought content normal.         Judgment: Judgment normal.             Past Medical History:   Diagnosis Date    Atrial fibrillation     History of prior ablation treatment     Hypothyroidism     S/P ablation of atrial fibrillation        Past Surgical History:   Procedure Laterality Date    CARDIAC ELECTROPHYSIOLOGY STUDY AND ABLATION      COLONOSCOPY  12/22/2017    internal hemorrhoids, otherwise normal = repeat in 10 years - Dr. Cally Fuchs with [facility]    ESOPHAGOGASTRODUODENOSCOPY N/A 10/28/2021    Procedure: EGD (ESOPHAGOGASTRODUODENOSCOPY);  Surgeon: Patricia Soni MD;  Location: [facility];  Service: Endoscopy;  Laterality: N/A;    KNEE SURGERY Right     NASAL SEPTOPLASTY N/A 07/29/2021    Procedure: SEPTOPLASTY, NOSE;  Surgeon: Josué Villafuerte MD;  Location: [facility] OR [address] [place] [address];  Service: ENT;  Laterality: N/A;    NASAL TURBINATE REDUCTION Bilateral 07/29/2021    Procedure: REDUCTION, NASAL TURBINATE;  Surgeon: Josué Villafuerte MD;  Location: [facility] OR [address] [place] [address];  Service: ENT;  Laterality: Bilateral;    SHOULDER ARTHROSCOPY Right        Family History   Problem Relation Name Age of Onset    Hypertension Mother      Atrial fibrillation Mother      Cancer Father          esophageal cancer    No Known Problems Brother         Social History     Socioeconomic History    Marital status:    Occupational History    Occupation:    Tobacco Use    Smoking status: Never     Passive exposure: Never     Smokeless tobacco: Never   Substance and Sexual Activity    Alcohol use: Yes     Comment: once a month or less    Drug use: Never    Sexual activity: Yes     Partners: Female     Social Determinants of Health     Financial Resource Strain: Low Risk  (6/5/2024)    Overall Financial Resource Strain (CARDIA)     Difficulty of Paying Living Expenses: Not hard at all   Food Insecurity: No Food Insecurity (6/5/2024)    Hunger Vital Sign     Worried About Running Out of Food in the Last Year: Never true     Ran Out of Food in the Last Year: Never true   Physical Activity: Sufficiently Active (6/5/2024)    Exercise Vital Sign     Days of Exercise per Week: 5 days     Minutes of Exercise per Session: 40 min   Stress: No Stress Concern Present (6/5/2024)    [employer] [place] [employer] - Occupational Stress Questionnaire     Feeling of Stress : Not at all   Housing Stability: Unknown (6/5/2024)    Housing Stability Vital Sign     Unable to Pay for Housing in the Last Year: No

## 2024-09-06 ENCOUNTER — PATIENT OUTREACH (OUTPATIENT)
Dept: ADMINISTRATIVE | Facility: HOSPITAL | Age: 59
End: 2024-09-06
Payer: COMMERCIAL

## 2024-09-06 NOTE — PROGRESS NOTES
Health Maintenance Topic(s) Outreach Outcomes & Actions Taken:    Lab(s) - Outreach Outcomes & Actions Taken  : External Records Uploaded & Care Team Updated if Applicable

## 2024-09-27 ENCOUNTER — OFFICE VISIT (OUTPATIENT)
Dept: CARDIOLOGY | Facility: CLINIC | Age: 59
End: 2024-09-27
Payer: COMMERCIAL

## 2024-09-27 VITALS
HEART RATE: 62 BPM | HEIGHT: 72 IN | SYSTOLIC BLOOD PRESSURE: 132 MMHG | DIASTOLIC BLOOD PRESSURE: 82 MMHG | BODY MASS INDEX: 33.35 KG/M2 | WEIGHT: 246.25 LBS | OXYGEN SATURATION: 98 %

## 2024-09-27 DIAGNOSIS — Z98.890 S/P ABLATION OF ATRIAL FLUTTER: Primary | ICD-10-CM

## 2024-09-27 DIAGNOSIS — I48.0 PAROXYSMAL ATRIAL FIBRILLATION WITH RVR: ICD-10-CM

## 2024-09-27 DIAGNOSIS — E66.812 CLASS 2 SEVERE OBESITY DUE TO EXCESS CALORIES WITH SERIOUS COMORBIDITY AND BODY MASS INDEX (BMI) OF 35.0 TO 35.9 IN ADULT: ICD-10-CM

## 2024-09-27 DIAGNOSIS — E66.01 CLASS 2 SEVERE OBESITY DUE TO EXCESS CALORIES WITH SERIOUS COMORBIDITY AND BODY MASS INDEX (BMI) OF 35.0 TO 35.9 IN ADULT: ICD-10-CM

## 2024-09-27 DIAGNOSIS — E06.3 HYPOTHYROIDISM DUE TO HASHIMOTO'S THYROIDITIS: ICD-10-CM

## 2024-09-27 DIAGNOSIS — R73.03 PREDIABETES: ICD-10-CM

## 2024-09-27 DIAGNOSIS — Z86.79 S/P ABLATION OF ATRIAL FLUTTER: Primary | ICD-10-CM

## 2024-09-27 DIAGNOSIS — E78.2 MIXED HYPERLIPIDEMIA: ICD-10-CM

## 2024-09-27 PROCEDURE — 99214 OFFICE O/P EST MOD 30 MIN: CPT | Mod: S$GLB,,,

## 2024-09-27 PROCEDURE — 99999 PR PBB SHADOW E&M-EST. PATIENT-LVL III: CPT | Mod: PBBFAC,,,

## 2024-09-27 NOTE — ASSESSMENT & PLAN NOTE
CM/SW Rounding Notes    Current Status:  Significant Events: pending auth for [facility], discharge ready- clinically accepted  Clinical barriers to Discharge/Transition: Medical necessity for acute care  External Barriers to Discharge/Transition: Payer/Authorization  Psychosocial Barriers to Discharge/Transition: Family/Patient Barriers, Family/Patient Education  Recommendations (response to barriers): Continue current plan  Recommended Referrals: [address] (O) (need orders)  Updates sent, auth pending    Addendum:  Received insurance auth for admission. Prior Admit Status:   Living Situation: Alone and residing at "[facility]    Support Systems: Children  Home Devices/Equipment: None  Mobility Assist Devices: None  Type of Service Prior to Hospitalization: None    Patient/Family Discharge Goal:   SNF       Therapy Recommendation for Discharge:   PT: Patient is appropriate for daily Physical Therapy   OT: Patient is appropriate for daily Occupational Therapy   SLP:    Recommended Mobility Equipment for Discharge:      Discharge Plan/Needs:   Recommendations for Discharge: SW/CM: SNF  Planned Discharge Destination: Home/apartment with Services/Support    Referral to: [facility]     [x] Accepted    [] Pending insurance authorization    [] Others:     Does the patient need assistance with arranging discharge transportation?: Yes   Patient will be picked up by: ambulance- max assist of 2, isolation Discussed with patient/family transportation options and potential  out of pocket cost.                 Devices/ Equipment that need to be arranged for discharge: None at this time      Accepting Facility/Home Service:  Receiving Agency/Facility: [facility]  Receiving Agency/Facility phone number:   Receiving Agency/Facility fax number: allscripts  Receiving Agency/Facility address: [address] [place] [address]  Receiving Agency/Facility city/state: [place]  Receiving Agency/Facility Type: Skilled Paroxysmal AFIB  S/P cardiac ablation in past  Followed by Electrophysiology Dr. Argueta - last appt 9/15/2023  Recent episode 5/29/2024 requiring hospitalization  Seen on 6/4/24 by [facility] Cardiologist Dr. Chaudhari progress notes:  ASSESSMENT  Plan  - Rare atrial fibrillation events  - Discussed option of possible pill in pocket strategy.  - Recommend stress testing prior to pursuing. Also echocardiogram for structural evaluation.  - Please schedule Lexiscan PET MPI for ischemic evaluation, atrial fibrillation. Plans to initiate class Ic anterior drug.  - Echocardiogram for structural assessment given recurrent atrial fibrillation. Multiple prior ablations.  - Continue Eliquis 1 month after ER visit. Can transition to daily 81 mg aspirin.  - Discontinue current Cardizem  - Continue Synthroid for thyroid management, defer to endocrine  - Follow-up after stress, echo. Same day if ableIncreased BMI: Provide patient with information regarding diet and lifestyle changes.  Stress Test scheduled for 6/14/24  ECHO scheduled for 6/18/24  Dr. Chaudhari follow up scheduled for 6/18/24  Stable today    -Will continue to follow with Dr. Almanza - [facility]    Nursing Facility  Receiving Agency/Facility Comment: accepted with [facility]

## 2024-09-27 NOTE — PROGRESS NOTES
Subjective:    Patient ID:  Walt Lopez is a 58 y.o. male who presents for evaluation of No chief complaint on file.      PCP: Love Carlisle NP     Referring Provider:     HPI: Patient is a 58 year old white male with paroxysmal AFIB S/P Cardiac Ablation, Hypothyroidism and Prediabetes followed by Endocrinology Dr. Alexander, and Mixed Hyperlipidemia who presents today for f/u appt. He was last seen on 6/21/24 to establish care and hospital follow up. He plans to continue EP services with [facility] EP services Dr. Almanza. He was continued on medical therapy.   Patient denies CP, SOB, palpitations, orthopnea, PND, presyncope, LOC, swelling, or claudication. Patient monitors home BP and ranges 130s. Patient reports medication compliance without side effects  Patient does exercise regularly and cut his grass without limitations. Patient emotional as his mother passed away this morning.         Past Medical History:   Diagnosis Date    Atrial fibrillation     History of prior ablation treatment     Hypothyroidism     S/P ablation of atrial fibrillation      Past Surgical History:   Procedure Laterality Date    CARDIAC ELECTROPHYSIOLOGY STUDY AND ABLATION      COLONOSCOPY  12/22/2017    internal hemorrhoids, otherwise normal = repeat in 10 years - Dr. Cally Fuchs with [facility]    ESOPHAGOGASTRODUODENOSCOPY N/A 10/28/2021    Procedure: EGD (ESOPHAGOGASTRODUODENOSCOPY);  Surgeon: Patricia Soni MD;  Location: [facility];  Service: Endoscopy;  Laterality: N/A;    KNEE SURGERY Right     NASAL SEPTOPLASTY N/A 07/29/2021    Procedure: SEPTOPLASTY, NOSE;  Surgeon: Josué Villafuerte MD;  Location: [facility] OR [address] [place] [address];  Service: ENT;  Laterality: N/A;    NASAL TURBINATE REDUCTION Bilateral 07/29/2021    Procedure: REDUCTION, NASAL TURBINATE;  Surgeon: Josué Villafuerte MD;  Location: [facility] OR [address] [place] [address];  Service: ENT;  Laterality: Bilateral;    SHOULDER ARTHROSCOPY Right      Social History     Socioeconomic History     Marital status:    Occupational History    Occupation:    Tobacco Use    Smoking status: Never     Passive exposure: Never    Smokeless tobacco: Never   Substance and Sexual Activity    Alcohol use: Yes     Comment: once a month or less    Drug use: Never    Sexual activity: Yes     Partners: Female     Social Determinants of Health     Financial Resource Strain: Low Risk  (6/5/2024)    Overall Financial Resource Strain (CARDIA)     Difficulty of Paying Living Expenses: Not hard at all   Food Insecurity: No Food Insecurity (6/5/2024)    Hunger Vital Sign     Worried About Running Out of Food in the Last Year: Never true     Ran Out of Food in the Last Year: Never true   Physical Activity: Sufficiently Active (6/5/2024)    Exercise Vital Sign     Days of Exercise per Week: 5 days     Minutes of Exercise per Session: 40 min   Stress: No Stress Concern Present (6/5/2024)    [employer] [place] [employer] - Occupational Stress Questionnaire     Feeling of Stress : Not at all   Housing Stability: Unknown (6/5/2024)    Housing Stability Vital Sign     Unable to Pay for Housing in the Last Year: No     Family History   Problem Relation Name Age of Onset    Hypertension Mother      Atrial fibrillation Mother      Cancer Father          esophageal cancer    No Known Problems Brother         Review of patient's allergies indicates:  No Known Allergies    Medication List with Changes/Refills   Current Medications    ASPIRIN (ECOTRIN) 81 MG EC TABLET    Take 81 mg by mouth once daily.    CETIRIZINE (ZYRTEC) 10 MG TABLET    Take 1 tablet (10 mg total) by mouth once daily.    FLECAINIDE (TAMBOCOR) 150 MG TAB    Take 150 mg by mouth as needed.    FLUTICASONE PROPIONATE (FLONASE) 50 MCG/ACTUATION NASAL SPRAY    2 sprays (100 mcg total) by Each Nostril route once daily.    MULTIVIT-MIN/FA/LYCOPEN/LUTEIN (CENTRUM SILVER MEN ORAL)    Take 1 tablet by mouth once daily.    OMEGA-3 FATTY ACIDS-FISH -1,200  MG CPDR    Take 1,000 mg by mouth.    ROSUVASTATIN (CRESTOR) 20 MG TABLET    Take 20 mg by mouth every evening.    SYNTHROID 200 MCG TABLET    Take 200 mcg by mouth before breakfast.    TIRZEPATIDE, WEIGHT LOSS, (ZEPBOUND) 2.5 MG/0.5 ML PNIJ    Inject 2.5 mg into the skin every 7 days.       Review of Systems   Constitutional: Negative for diaphoresis and fever.   HENT:  Negative for congestion and hearing loss.    Eyes:  Negative for blurred vision and pain.   Cardiovascular:  Negative for chest pain, claudication, dyspnea on exertion, leg swelling, near-syncope, palpitations and syncope.   Respiratory:  Negative for shortness of breath and sleep disturbances due to breathing.    Hematologic/Lymphatic: Negative for bleeding problem. Does not bruise/bleed easily.   Skin:  Negative for color change and poor wound healing.   Gastrointestinal:  Negative for abdominal pain and nausea.   Genitourinary:  Negative for bladder incontinence and flank pain.   Neurological:  Negative for focal weakness and light-headedness.        Objective:   /82 (BP Location: Left arm, Patient Position: Sitting, BP Method: Large (Manual))   Pulse 62   Ht 6' (1.829 m)   Wt 111.7 kg (246 lb 4.1 oz)   SpO2 98%   BMI 33.40 kg/m²    Physical Exam  Constitutional:       Appearance: He is well-developed. He is not diaphoretic.   HENT:      Head: Normocephalic and atraumatic.   Eyes:      General: No scleral icterus.     Pupils: Pupils are equal, round, and reactive to light.   Neck:      Vascular: No JVD.   Cardiovascular:      Rate and Rhythm: Normal rate and regular rhythm.      Pulses: Intact distal pulses.      Heart sounds: S1 normal and S2 normal. No murmur heard.     No friction rub. No gallop.   Pulmonary:      Effort: Pulmonary effort is normal. No respiratory distress.      Breath sounds: Normal breath sounds. No wheezing or rales.   Chest:      Chest wall: No tenderness.   Abdominal:      General: Bowel sounds are normal.  There is no distension.      Palpations: Abdomen is soft. There is no mass.      Tenderness: There is no abdominal tenderness. There is no rebound.   Musculoskeletal:         General: No tenderness. Normal range of motion.      Cervical back: Normal range of motion and neck supple.   Skin:     General: Skin is warm and dry.      Coloration: Skin is not pale.   Neurological:      Mental Status: He is alert and oriented to person, place, and time.      Coordination: Coordination normal.      Deep Tendon Reflexes: Reflexes normal.   Psychiatric:         Behavior: Behavior normal.         Judgment: Judgment normal.           Assessment:       1. S/P ablation of atrial flutter    2. Paroxysmal atrial fibrillation with RVR    3. Mixed hyperlipidemia    4. Prediabetes    5. Hypothyroidism due to Hashimoto's thyroiditis    6. Class 2 severe obesity due to excess calories with serious comorbidity and body mass index (BMI) of 35.0 to 35.9 in adult         Plan:         S/P ablation of atrial flutter  -Obtain records from  [facility]- EP     Paroxysmal atrial fibrillation with RVR  Paroxysmal AFIB  S/P cardiac ablation in past  Followed by Electrophysiology Dr. Argueta - last appt 9/15/2023  Recent episode 5/29/2024 requiring hospitalization  Seen on 6/4/24 by [facility] Cardiologist Dr. Chaudhari progress notes:  ASSESSMENT  Plan  - Rare atrial fibrillation events  - Discussed option of possible pill in pocket strategy.  - Recommend stress testing prior to pursuing. Also echocardiogram for structural evaluation.  - Please schedule Lexiscan PET MPI for ischemic evaluation, atrial fibrillation. Plans to initiate class Ic anterior drug.  - Echocardiogram for structural assessment given recurrent atrial fibrillation. Multiple prior ablations.  - Continue Eliquis 1 month after ER visit. Can transition to daily 81 mg aspirin.  - Discontinue current Cardizem  - Continue Synthroid for thyroid management, defer to endocrine  - Follow-up after stress,  echo. Same day if ableIncreased BMI: Provide patient with information regarding diet and lifestyle changes.  Stress Test scheduled for 6/14/24  ECHO scheduled for 6/18/24  Dr. Chaudhari follow up scheduled for 6/18/24  Stable today    -Will continue to follow with Dr. Almanza - EP w [facility]     Mixed hyperlipidemia  - 5/30/24  -Continue - Crestor 20 mg   -repeat Lipid panel     Prediabetes  -Followed by PCP  -A1c 5.6% 5/31/24    Hypothyroidism  - Followed by PCP  -continue medical therapy- synthroid 200 mcg     Class 2 severe obesity due to excess calories with serious comorbidity and body mass index (BMI) of 35.0 to 35.9 in adult  Body mass index is 33.4 kg/m². Morbid obesity complicates all aspects of disease management from diagnostic modalities to treatment. Weight loss encouraged and health benefits explained to patient.         Total duration of face to face visit time 30 minutes.  Total time spent counseling greater than fifty percent of total visit time.  Counseling included discussion regarding imaging findings, diagnosis, possibilities, treatment options, risks and benefits.  The patient had many questions regarding the options and long-term effects      He Gonsales, MILO  Cardiology

## 2024-09-27 NOTE — ASSESSMENT & PLAN NOTE
Body mass index is 33.4 kg/m². Morbid obesity complicates all aspects of disease management from diagnostic modalities to treatment. Weight loss encouraged and health benefits explained to patient.

## 2024-09-29 ENCOUNTER — PATIENT MESSAGE (OUTPATIENT)
Dept: FAMILY MEDICINE | Facility: CLINIC | Age: 59
End: 2024-09-29
Payer: COMMERCIAL

## 2024-09-29 DIAGNOSIS — E66.01 CLASS 2 SEVERE OBESITY DUE TO EXCESS CALORIES WITH SERIOUS COMORBIDITY AND BODY MASS INDEX (BMI) OF 35.0 TO 35.9 IN ADULT: Primary | ICD-10-CM

## 2024-09-29 DIAGNOSIS — E66.812 CLASS 2 SEVERE OBESITY DUE TO EXCESS CALORIES WITH SERIOUS COMORBIDITY AND BODY MASS INDEX (BMI) OF 35.0 TO 35.9 IN ADULT: Primary | ICD-10-CM

## 2024-09-30 RX ORDER — TIRZEPATIDE 5 MG/.5ML
5 INJECTION, SOLUTION SUBCUTANEOUS
Qty: 4 PEN | Refills: 0 | Status: SHIPPED | OUTPATIENT
Start: 2024-09-30

## 2024-10-02 ENCOUNTER — TELEPHONE (OUTPATIENT)
Dept: CARDIOLOGY | Facility: CLINIC | Age: 59
End: 2024-10-02
Payer: COMMERCIAL

## 2024-10-02 NOTE — TELEPHONE ENCOUNTER
I reached out to Mr. Godoy and informed Him of his results & HE expressed understanding of the results.    Cookie Vaughn  Medical Assistant  [facility]  He Gonsales DNP  Cardiology    ----- Message from He Gonsales NP sent at 10/1/2024  4:04 PM CDT -----  Please inform, Mr. Lopez, your cholesterol levels have improved to within normal limits. Please continue medications as prescribed.    Thank you,  He Gonsales DNP

## 2024-10-23 ENCOUNTER — PATIENT MESSAGE (OUTPATIENT)
Dept: RESEARCH | Facility: HOSPITAL | Age: 59
End: 2024-10-23
Payer: COMMERCIAL

## 2024-10-31 ENCOUNTER — OFFICE VISIT (OUTPATIENT)
Dept: FAMILY MEDICINE | Facility: CLINIC | Age: 59
End: 2024-10-31
Payer: COMMERCIAL

## 2024-10-31 VITALS
WEIGHT: 241.5 LBS | HEIGHT: 72 IN | OXYGEN SATURATION: 98 % | TEMPERATURE: 98 F | BODY MASS INDEX: 32.71 KG/M2 | HEART RATE: 75 BPM | DIASTOLIC BLOOD PRESSURE: 80 MMHG | SYSTOLIC BLOOD PRESSURE: 112 MMHG

## 2024-10-31 DIAGNOSIS — E66.09 CLASS 1 OBESITY DUE TO EXCESS CALORIES WITH SERIOUS COMORBIDITY AND BODY MASS INDEX (BMI) OF 32.0 TO 32.9 IN ADULT: Primary | ICD-10-CM

## 2024-10-31 DIAGNOSIS — E66.811 CLASS 1 OBESITY DUE TO EXCESS CALORIES WITH SERIOUS COMORBIDITY AND BODY MASS INDEX (BMI) OF 32.0 TO 32.9 IN ADULT: Primary | ICD-10-CM

## 2024-10-31 PROCEDURE — 99999 PR PBB SHADOW E&M-EST. PATIENT-LVL IV: CPT | Mod: PBBFAC,,, | Performed by: NURSE PRACTITIONER

## 2024-10-31 PROCEDURE — 99213 OFFICE O/P EST LOW 20 MIN: CPT | Mod: S$GLB,,, | Performed by: NURSE PRACTITIONER

## 2024-10-31 RX ORDER — TIRZEPATIDE 5 MG/.5ML
5 INJECTION, SOLUTION SUBCUTANEOUS
Qty: 4 PEN | Refills: 1 | Status: SHIPPED | OUTPATIENT
Start: 2024-10-31

## 2024-11-05 ENCOUNTER — PATIENT MESSAGE (OUTPATIENT)
Dept: RESEARCH | Facility: HOSPITAL | Age: 59
End: 2024-11-05
Payer: COMMERCIAL

## 2024-11-08 LAB
HBA1C MFR BLD: 5.2 %
HDLC SERPL-MCNC: 45 MG/DL (ref 35–70)
LDLC SERPL CALC-MCNC: 80 MG/DL (ref 0–160)
TRIGL SERPL-MCNC: 89 MG/DL (ref 40–160)
VLDL CHOLESTEROL: 17 MG/DL

## 2024-12-05 ENCOUNTER — PATIENT MESSAGE (OUTPATIENT)
Dept: OTOLARYNGOLOGY | Facility: CLINIC | Age: 59
End: 2024-12-05
Payer: COMMERCIAL

## 2024-12-17 ENCOUNTER — PATIENT MESSAGE (OUTPATIENT)
Dept: FAMILY MEDICINE | Facility: CLINIC | Age: 59
End: 2024-12-17
Payer: COMMERCIAL

## 2024-12-26 ENCOUNTER — OFFICE VISIT (OUTPATIENT)
Dept: FAMILY MEDICINE | Facility: CLINIC | Age: 59
End: 2024-12-26
Payer: COMMERCIAL

## 2024-12-26 VITALS
HEART RATE: 79 BPM | HEIGHT: 72 IN | WEIGHT: 235.44 LBS | TEMPERATURE: 98 F | BODY MASS INDEX: 31.89 KG/M2 | DIASTOLIC BLOOD PRESSURE: 80 MMHG | OXYGEN SATURATION: 98 % | SYSTOLIC BLOOD PRESSURE: 120 MMHG

## 2024-12-26 DIAGNOSIS — E78.2 MIXED HYPERLIPIDEMIA: ICD-10-CM

## 2024-12-26 DIAGNOSIS — E06.3 HYPOTHYROIDISM DUE TO HASHIMOTO THYROIDITIS: ICD-10-CM

## 2024-12-26 DIAGNOSIS — Z87.898 HISTORY OF PREDIABETES: ICD-10-CM

## 2024-12-26 DIAGNOSIS — I48.0 PAROXYSMAL ATRIAL FIBRILLATION WITH RVR: ICD-10-CM

## 2024-12-26 DIAGNOSIS — E66.09 CLASS 1 OBESITY DUE TO EXCESS CALORIES WITH SERIOUS COMORBIDITY AND BODY MASS INDEX (BMI) OF 31.0 TO 31.9 IN ADULT: Primary | ICD-10-CM

## 2024-12-26 DIAGNOSIS — E66.811 CLASS 1 OBESITY DUE TO EXCESS CALORIES WITH SERIOUS COMORBIDITY AND BODY MASS INDEX (BMI) OF 31.0 TO 31.9 IN ADULT: Primary | ICD-10-CM

## 2024-12-26 PROCEDURE — 99999 PR PBB SHADOW E&M-EST. PATIENT-LVL IV: CPT | Mod: PBBFAC,,, | Performed by: NURSE PRACTITIONER

## 2024-12-26 RX ORDER — TIRZEPATIDE 5 MG/.5ML
5 INJECTION, SOLUTION SUBCUTANEOUS
Qty: 4 PEN | Refills: 1 | Status: SHIPPED | OUTPATIENT
Start: 2024-12-26

## 2024-12-26 NOTE — ASSESSMENT & PLAN NOTE
S/P cardiac ablation in past  Followed by [facility] Electrophysiology  Recent episode 5/29/2024 requiring hospitalization  Transferred to [facility] and followed by Electrophysiologist Dr. Chaudhari  F/U on 6/17/24 by [facility] Electrophysiologist Dr. Chaudhari progress notes:  ASSESSMENT  Plan  - Testing results reviewed patient  Stress test with no perfusion abnormalities appreciated.  - Prior coronary calcium scoring 6.  - PAF, will initiate pill in pocket strategy. Flecainide 300mg to be taken as needed for atrial fibrillation recurrence. Advised patient to present to ER for evaluation prior to taking.  - Patient can also utilize diltiazem for aid in heart rate control should events recur.  - Will repeat coronary calcium scoring, patient request performing an [facility] hospital.  Repeat EKG 12/17/2024:  Normal Sinus Rhythm.

## 2024-12-26 NOTE — PROGRESS NOTES
Subjective:       Patient ID: Walt Lopez is a 59 y.o. male.    Chief Complaint: Follow-up (8 weeks F/U for Med Check)        HPI WITH ASSESSMENT AND PLAN OF CARE:        Patient is a 59 year old white male with paroxysmal AFIB S/P Cardiac Ablation, Hypothyroidism and Prediabetes followed by Endocrinology Dr. Alexander, and Mixed Hyperlipidemia that is here today for 8 week follow up.        Obesity  Body mass index is 31.93 kg/m².  Working on lifestyle modifications  Denies personal and family history on pancreatitis or thyroid cancer.   Started Zepbound 2.5  mg weekly x 4 weeks on 9/5/2024  Increase to 5 mg injection weekly on 10/1/2024  23 lb weight loss over the past 4 months.  Stay on the Zepbound 5 mg injection weekly  Follow up in 8 weeks      Paroxysmal AFIB  S/P cardiac ablation in past  Followed by [facility] Electrophysiology  Recent episode 5/29/2024 requiring hospitalization  Transferred to [facility] and followed by Electrophysiologist Dr. Chaudhari  F/U on 6/17/24 by [facility] Electrophysiologist Dr. Chaudhari progress notes:  ASSESSMENT  Plan  - Testing results reviewed patient  Stress test with no perfusion abnormalities appreciated.  - Prior coronary calcium scoring 6.  - PAF, will initiate pill in pocket strategy. Flecainide 300mg to be taken as needed for atrial fibrillation recurrence. Advised patient to present to ER for evaluation prior to taking.  - Patient can also utilize diltiazem for aid in heart rate control should events recur.  - Will repeat coronary calcium scoring, patient request performing an [facility] hospital.  Repeat EKG 12/17/2024:  Normal Sinus Rhythm.       Mixed Hyperlipidemia  Currently taking Rosuvastatin 20 mg daily.  States he is working on lifestyle modifications and diet   Last LDL in my system September 2024 - 69.2  Outside lab 11/8/2024:  LDL 80  Stable.            History of Prediabetes  HgbA1C 5.7% in May 2023  Currently 5.2% 11/8/24 labs  Followed by  Endocrinology Dr. Alexander  See outside lab results below.        Hypothyroidism due to Hashimoto's  Followed by Endocrinology Dr. Alexander  Last appt with Endocrinology 11/20/201  Synthroid Tablet, 200 MCG, 1 tablet in the morning on an empty stomach, Oral, 1 tab M- Sat and 1/2 on Sunday, 90 days, 90, Refills 3.        Notes: stable  1. ON Synthroid 200 mcg daily - MALOU, M- Sat and 1/2 on Sunday  2. discussed how to take  3. monitor free t4     4.  Repeat labs and follow up in 6 months.                     Vitals:    12/26/24 1255   BP: 120/80   BP Location: Right arm   Patient Position: Sitting   Pulse: 79   Temp: 97.7 °F (36.5 °C)   TempSrc: Temporal   SpO2: 98%   Weight: 106.8 kg (235 lb 7.2 oz)   Height: 6' (1.829 m)         Diagnoses this Encounter:         ICD-10-CM ICD-9-CM   1. Class 1 obesity due to excess calories with serious comorbidity and body mass index (BMI) of 32.0 to 32.9 in adult  E66.811 278.00    E66.09 V85.32    Z68.32        Orders Placed This Encounter    tirzepatide, weight loss, (ZEPBOUND) 5 mg/0.5 mL PnIj        No follow-ups on file.     Patient's Medications   New Prescriptions    No medications on file   Previous Medications    ASPIRIN (ECOTRIN) 81 MG EC TABLET    Take 81 mg by mouth once daily.    CETIRIZINE (ZYRTEC) 10 MG TABLET    Take 1 tablet (10 mg total) by mouth once daily.    FLECAINIDE (TAMBOCOR) 150 MG TAB    Take 150 mg by mouth as needed.    FLUTICASONE PROPIONATE (FLONASE) 50 MCG/ACTUATION NASAL SPRAY    2 sprays (100 mcg total) by Each Nostril route once daily.    MULTIVIT-MIN/FA/LYCOPEN/LUTEIN (CENTRUM SILVER MEN ORAL)    Take 1 tablet by mouth once daily.    OMEGA-3 FATTY ACIDS-FISH -1,200 MG CPDR    Take 1,000 mg by mouth.    ROSUVASTATIN (CRESTOR) 20 MG TABLET    Take 20 mg by mouth every evening.    SYNTHROID 200 MCG TABLET    Take 200 mcg by mouth before breakfast.   Modified Medications    Modified Medication Previous Medication    TIRZEPATIDE, WEIGHT LOSS,  (ZEPBOUND) 5 MG/0.5 ML PNIJ tirzepatide, weight loss, (ZEPBOUND) 5 mg/0.5 mL PnIj       Inject 5 mg into the skin every 7 days.    Inject 5 mg into the skin every 7 days.   Discontinued Medications    No medications on file         Review of Systems      Objective:        Physical Exam        Past Medical History:   Diagnosis Date    Atrial fibrillation     History of prior ablation treatment     Hypothyroidism     S/P ablation of atrial fibrillation        Past Surgical History:   Procedure Laterality Date    CARDIAC ELECTROPHYSIOLOGY STUDY AND ABLATION      COLONOSCOPY  12/22/2017    internal hemorrhoids, otherwise normal = repeat in 10 years - Dr. Cally Fuchs with [facility]    ESOPHAGOGASTRODUODENOSCOPY N/A 10/28/2021    Procedure: EGD (ESOPHAGOGASTRODUODENOSCOPY);  Surgeon: Patricia Soni MD;  Location: [facility];  Service: Endoscopy;  Laterality: N/A;    KNEE SURGERY Right     NASAL SEPTOPLASTY N/A 07/29/2021    Procedure: SEPTOPLASTY, NOSE;  Surgeon: Josué Villafuerte MD;  Location: [facility] OR [address] [place] [address];  Service: ENT;  Laterality: N/A;    NASAL TURBINATE REDUCTION Bilateral 07/29/2021    Procedure: REDUCTION, NASAL TURBINATE;  Surgeon: Josué Villafuerte MD;  [facility];  Service: ENT;  Laterality: Bilateral;    SHOULDER ARTHROSCOPY Right        Family History   Problem Relation Name Age of Onset    Hypertension Mother      Atrial fibrillation Mother      Cancer Father          esophageal cancer    No Known Problems Brother         Social History     Socioeconomic History    Marital status:    Occupational History    Occupation:    Tobacco Use    Smoking status: Never     Passive exposure: Never    Smokeless tobacco: Never   Substance and Sexual Activity    Alcohol use: Yes     Comment: once a month or less    Drug use: Never    Sexual activity: Yes     Partners: Female     Social Drivers of Health     Financial Resource Strain: Low Risk  (6/5/2024)    Overall Financial Resource  Strain (CARDIA)     Difficulty of Paying Living Expenses: Not hard at all   Food Insecurity: No Food Insecurity (6/5/2024)    Hunger Vital Sign     Worried About Running Out of Food in the Last Year: Never true     Ran Out of Food in the Last Year: Never true   Physical Activity: Sufficiently Active (6/5/2024)    Exercise Vital Sign     Days of Exercise per Week: 5 days     Minutes of Exercise per Session: 40 min   Stress: No Stress Concern Present (6/5/2024)    [employer] - Occupational Stress Questionnaire     Feeling of Stress : Not at all   Housing Stability: Unknown (6/5/2024)    Housing Stability Vital Sign     Unable to Pay for Housing in the Last Year: No

## 2024-12-26 NOTE — ASSESSMENT & PLAN NOTE
Currently taking Rosuvastatin 20 mg daily.  States he is working on lifestyle modifications and diet   Last LDL in my system September 2024 - 69.2  Outside lab 11/8/2024:  LDL 80  Stable.

## 2024-12-26 NOTE — ASSESSMENT & PLAN NOTE
Body mass index is 31.93 kg/m².  Working on lifestyle modifications  Denies personal and family history on pancreatitis or thyroid cancer.   Started Zepbound 2.5  mg weekly x 4 weeks on 9/5/2024  Increase to 5 mg injection weekly on 10/1/2024  23 lb weight loss over the past 4 months.  Stay on the Zepbound 5 mg injection weekly  Follow up in 8 weeks

## 2025-01-16 ENCOUNTER — PATIENT MESSAGE (OUTPATIENT)
Dept: FAMILY MEDICINE | Facility: CLINIC | Age: 60
End: 2025-01-16
Payer: COMMERCIAL

## 2025-01-27 ENCOUNTER — OFFICE VISIT (OUTPATIENT)
Dept: FAMILY MEDICINE | Facility: CLINIC | Age: 60
End: 2025-01-27
Payer: COMMERCIAL

## 2025-01-27 VITALS
DIASTOLIC BLOOD PRESSURE: 74 MMHG | OXYGEN SATURATION: 98 % | WEIGHT: 240.06 LBS | BODY MASS INDEX: 32.52 KG/M2 | HEART RATE: 88 BPM | HEIGHT: 72 IN | TEMPERATURE: 98 F | SYSTOLIC BLOOD PRESSURE: 104 MMHG

## 2025-01-27 DIAGNOSIS — E66.09 CLASS 1 OBESITY DUE TO EXCESS CALORIES WITH SERIOUS COMORBIDITY AND BODY MASS INDEX (BMI) OF 32.0 TO 32.9 IN ADULT: Primary | ICD-10-CM

## 2025-01-27 DIAGNOSIS — E66.811 CLASS 1 OBESITY DUE TO EXCESS CALORIES WITH SERIOUS COMORBIDITY AND BODY MASS INDEX (BMI) OF 32.0 TO 32.9 IN ADULT: Primary | ICD-10-CM

## 2025-01-27 PROCEDURE — 99999 PR PBB SHADOW E&M-EST. PATIENT-LVL IV: CPT | Mod: PBBFAC,,, | Performed by: NURSE PRACTITIONER

## 2025-01-27 PROCEDURE — 99214 OFFICE O/P EST MOD 30 MIN: CPT | Mod: S$GLB,,, | Performed by: NURSE PRACTITIONER

## 2025-01-27 RX ORDER — DIAZEPAM 2 MG/1
2 TABLET ORAL
COMMUNITY
Start: 2024-12-30

## 2025-01-27 RX ORDER — TIRZEPATIDE 7.5 MG/.5ML
7.5 INJECTION, SOLUTION SUBCUTANEOUS
Qty: 4 PEN | Refills: 1 | Status: SHIPPED | OUTPATIENT
Start: 2025-01-27

## 2025-01-27 NOTE — PROGRESS NOTES
Subjective:       Patient ID: Walt Lopez is a 59 y.o. male.    Chief Complaint: Follow-up        HPI WITH ASSESSMENT AND PLAN OF CARE:      Patient is a 59 year old white male with paroxysmal AFIB S/P Cardiac Ablation, Hypothyroidism and Prediabetes followed by Endocrinology Dr. Alexander, and Mixed Hyperlipidemia that is here today for 4 week follow up for weight check.        Obesity  Body mass index is 32.56 kg/m².  Working on lifestyle modifications  Denies personal and family history on pancreatitis or thyroid cancer.   Started Zepbound 2.5  mg weekly x 4 weeks on 9/5/2024  Increase to 5 mg injection weekly on 10/1/2024  23 lb weight loss Sept-December 2024.  Gained 5 pounds since 12/26/2024  INCREASE Zepbound to 7.5 mg injection weekly 1/27/25.  Follow up in 8 weeks    Wt Readings from Last 3 Encounters:   01/27/25 1631 108.9 kg (240 lb 1.3 oz)   12/26/24 1255 106.8 kg (235 lb 7.2 oz)   10/31/24 1319 109.5 kg (241 lb 8.2 oz)        CHRONIC PROBLEM REVIEW:    Paroxysmal AFIB  S/P cardiac ablation in past  Followed by [facility] Electrophysiology  Recent episode 5/29/2024 requiring hospitalization  Transferred to [facility] and followed by Electrophysiologist Dr. Chaudhari  F/U on 6/17/24 by [facility] Electrophysiologist Dr. Chaudhari progress notes:  ASSESSMENT  Plan  - Testing results reviewed patient  Stress test with no perfusion abnormalities appreciated.  - Prior coronary calcium scoring 6.  - PAF, will initiate pill in pocket strategy. Flecainide 300mg to be taken as needed for atrial fibrillation recurrence. Advised patient to present to ER for evaluation prior to taking.  - Patient can also utilize diltiazem for aid in heart rate control should events recur.  - Will repeat coronary calcium scoring, patient request performing an [facility] hospital.  Repeat EKG 12/17/2024:  Normal Sinus Rhythm.        Mixed Hyperlipidemia  Currently taking Rosuvastatin 20 mg daily.  States he is working on  lifestyle modifications and diet   Last LDL in my system September 2024 - 69.2  Outside lab 11/8/2024:  LDL 80  Stable.             History of Prediabetes  HgbA1C 5.7% in May 2023  Currently 5.2% 11/8/24 labs  Followed by Endocrinology Dr. Alexander  See outside lab results below.        Hypothyroidism due to Hashimoto's  Followed by Endocrinology Dr. Alexander  Last appt with Endocrinology 11/20/2024  Synthroid Tablet, 200 MCG, 1 tablet in the morning on an empty stomach, Oral, 1 tab M- Sat and 1/2 on Sunday, 90 days, 90, Refills 3.        Notes: stable  1. ON Synthroid 200 mcg daily - MALOU, M- Sat and 1/2 on Sunday  2. discussed how to take  3. monitor free t4     4.  Repeat labs and follow up in 6 months.      Vitals:    01/27/25 1631   BP: 104/74   BP Location: Left arm   Patient Position: Sitting   Pulse: 88   Temp: 97.7 °F (36.5 °C)   TempSrc: Temporal   SpO2: 98%   Weight: 108.9 kg (240 lb 1.3 oz)   Height: 6' (1.829 m)         Diagnoses this Encounter:         ICD-10-CM ICD-9-CM   1. Class 1 obesity due to excess calories with serious comorbidity and body mass index (BMI) of 32.0 to 32.9 in adult  E66.811 278.00    E66.09 V85.32    Z68.32        Orders Placed This Encounter    tirzepatide, weight loss, (ZEPBOUND) 7.5 mg/0.5 mL PnIj        Follow up in about 7 weeks (around 3/19/2025).     Patient's Medications   New Prescriptions    TIRZEPATIDE, WEIGHT LOSS, (ZEPBOUND) 7.5 MG/0.5 ML PNIJ    Inject 7.5 mg into the skin every 7 days.   Previous Medications    ASPIRIN (ECOTRIN) 81 MG EC TABLET    Take 81 mg by mouth once daily.    CETIRIZINE (ZYRTEC) 10 MG TABLET    Take 1 tablet (10 mg total) by mouth once daily.    DIAZEPAM (VALIUM) 2 MG TABLET    Take 2 mg by mouth.    FLECAINIDE (TAMBOCOR) 150 MG TAB    Take 150 mg by mouth as needed.    FLUTICASONE PROPIONATE (FLONASE) 50 MCG/ACTUATION NASAL SPRAY    2 sprays (100 mcg total) by Each Nostril route once daily.    MULTIVIT-MIN/FA/LYCOPEN/LUTEIN (CENTRUM SILVER  MEN ORAL)    Take 1 tablet by mouth once daily.    OMEGA-3 FATTY ACIDS-FISH -1,200 MG CPDR    Take 1,000 mg by mouth.    ROSUVASTATIN (CRESTOR) 20 MG TABLET    Take 20 mg by mouth every evening.    SYNTHROID 200 MCG TABLET    Take 200 mcg by mouth before breakfast.   Modified Medications    No medications on file   Discontinued Medications    TIRZEPATIDE, WEIGHT LOSS, (ZEPBOUND) 5 MG/0.5 ML PNIJ    Inject 5 mg into the skin every 7 days.         Review of Systems   Constitutional: Negative.    HENT: Negative.     Eyes: Negative.    Respiratory: Negative.     Cardiovascular: Negative.    Gastrointestinal: Negative.    Endocrine: Negative.    Genitourinary: Negative.    Musculoskeletal: Negative.    Skin: Negative.    Allergic/Immunologic: Negative.    Neurological: Negative.    Hematological: Negative.    Psychiatric/Behavioral: Negative.     All other systems reviewed and are negative.        Objective:        Physical Exam  Constitutional:       Appearance: He is obese.      Comments: Body mass index is 32.56 kg/m².     HENT:      Head: Normocephalic.   Cardiovascular:      Rate and Rhythm: Normal rate and regular rhythm.   Pulmonary:      Effort: Pulmonary effort is normal.      Breath sounds: Normal breath sounds.   Musculoskeletal:         General: Normal range of motion.   Skin:     General: Skin is warm and dry.   Neurological:      Mental Status: He is alert and oriented to person, place, and time.   Psychiatric:         Mood and Affect: Mood normal.         Behavior: Behavior normal.             Past Medical History:   Diagnosis Date    Atrial fibrillation     History of prior ablation treatment     Hypothyroidism     S/P ablation of atrial fibrillation        Past Surgical History:   Procedure Laterality Date    CARDIAC ELECTROPHYSIOLOGY STUDY AND ABLATION      COLONOSCOPY  12/22/2017    internal hemorrhoids, otherwise normal = repeat in 10 years - Dr. Cally Fuchs with [facility]     ESOPHAGOGASTRODUODENOSCOPY N/A 10/28/2021    Procedure: EGD (ESOPHAGOGASTRODUODENOSCOPY);  Surgeon: Patricia Soni MD;  Location: [facility];  Service: Endoscopy;  Laterality: N/A;    KNEE SURGERY Right     NASAL SEPTOPLASTY N/A 07/29/2021    Procedure: SEPTOPLASTY, NOSE;  Surgeon: Josué Villafuerte MD;  Location: [facility];  Service: ENT;  Laterality: N/A;    NASAL TURBINATE REDUCTION Bilateral 07/29/2021    Procedure: REDUCTION, NASAL TURBINATE;  Surgeon: Josué Villafuerte MD;  Location: [facility] OR [facility];  Service: ENT;  Laterality: Bilateral;    SHOULDER ARTHROSCOPY Right        Family History   Problem Relation Name Age of Onset    Hypertension Mother      Atrial fibrillation Mother      Cancer Father          esophageal cancer    No Known Problems Brother         Social History     Socioeconomic History    Marital status:    Occupational History    Occupation:    Tobacco Use    Smoking status: Never     Passive exposure: Never    Smokeless tobacco: Never   Substance and Sexual Activity    Alcohol use: Yes     Comment: once a month or less    Drug use: Never    Sexual activity: Yes     Partners: Female     Social Drivers of Health     Financial Resource Strain: Low Risk  (6/5/2024)    Overall Financial Resource Strain (CARDIA)     Difficulty of Paying Living Expenses: Not hard at all   Food Insecurity: No Food Insecurity (6/5/2024)    Hunger Vital Sign     Worried About Running Out of Food in the Last Year: Never true     Ran Out of Food in the Last Year: Never true   Physical Activity: Sufficiently Active (6/5/2024)    Exercise Vital Sign     Days of Exercise per Week: 5 days     Minutes of Exercise per Session: 40 min   Stress: No Stress Concern Present (6/5/2024)    [employer] - Occupational Stress Questionnaire     Feeling of Stress : Not at all   Housing Stability: Unknown (6/5/2024)    Housing Stability Vital Sign     Unable to Pay for Housing in the  Last Year: No

## 2025-02-11 ENCOUNTER — OFFICE VISIT (OUTPATIENT)
Dept: URGENT CARE | Facility: CLINIC | Age: 60
End: 2025-02-11
Payer: COMMERCIAL

## 2025-02-11 VITALS
DIASTOLIC BLOOD PRESSURE: 69 MMHG | WEIGHT: 240.06 LBS | SYSTOLIC BLOOD PRESSURE: 119 MMHG | HEIGHT: 72 IN | HEART RATE: 72 BPM | RESPIRATION RATE: 17 BRPM | OXYGEN SATURATION: 98 % | BODY MASS INDEX: 32.52 KG/M2 | TEMPERATURE: 99 F

## 2025-02-11 DIAGNOSIS — I48.0 PAROXYSMAL ATRIAL FIBRILLATION: ICD-10-CM

## 2025-02-11 DIAGNOSIS — J10.1 INFLUENZA A: ICD-10-CM

## 2025-02-11 DIAGNOSIS — R05.9 COUGH, UNSPECIFIED TYPE: ICD-10-CM

## 2025-02-11 DIAGNOSIS — Z87.898 HISTORY OF PREDIABETES: ICD-10-CM

## 2025-02-11 DIAGNOSIS — E07.9 THYROID DISORDER: ICD-10-CM

## 2025-02-11 DIAGNOSIS — J98.01 BRONCHOSPASM: Primary | ICD-10-CM

## 2025-02-11 DIAGNOSIS — E78.2 MIXED HYPERLIPIDEMIA: ICD-10-CM

## 2025-02-11 LAB
CTP QC/QA: YES
POC MOLECULAR INFLUENZA A AGN: POSITIVE
POC MOLECULAR INFLUENZA B AGN: NEGATIVE

## 2025-02-11 PROCEDURE — 87502 INFLUENZA DNA AMP PROBE: CPT | Mod: QW,S$GLB,, | Performed by: PHYSICIAN ASSISTANT

## 2025-02-11 PROCEDURE — 99214 OFFICE O/P EST MOD 30 MIN: CPT | Mod: S$GLB,,, | Performed by: PHYSICIAN ASSISTANT

## 2025-02-11 RX ORDER — PREDNISONE 20 MG/1
40 TABLET ORAL DAILY
Qty: 8 TABLET | Refills: 0 | Status: SHIPPED | OUTPATIENT
Start: 2025-02-11 | End: 2025-02-15

## 2025-02-11 RX ORDER — PROMETHAZINE HYDROCHLORIDE AND DEXTROMETHORPHAN HYDROBROMIDE 6.25; 15 MG/5ML; MG/5ML
5 SYRUP ORAL EVERY 6 HOURS PRN
Qty: 118 ML | Refills: 0 | Status: SHIPPED | OUTPATIENT
Start: 2025-02-11 | End: 2025-02-21

## 2025-02-11 NOTE — PROGRESS NOTES
Subjective:      Patient ID: Walt Lopez is a 59 y.o. male.    Vitals:  height is 6' (1.829 m) and weight is 108.9 kg (240 lb 1.3 oz). His oral temperature is 99 °F (37.2 °C). His blood pressure is 119/69 and his pulse is 72. His respiration is 17 and oxygen saturation is 98%.     Chief Complaint: Cough (Persistent cough and minor congestion - Entered by patient)    Pt states that he has a bad cough,sore throat,body chills, and congestion.Pt has been taking otc meds for symptoms.    Patient provider note starts here:    Patient presents to the clinic with complaints of having a bad cough, sore throat, body aches, chills and nasal congestion.  Symptoms started several days ago and seemed to peak over the weekend.  Reports that he is feeling much improved however, has a lingering cough, postnasal drip which is causing a scratchy throat in addition to intermittent nasal congestion.  Denies recent fevers.  He has taken Flonase for his symptoms.     Cough  This is a new problem. Episode onset: 2 days ago. The problem has been unchanged. The cough is Non-productive. Associated symptoms include chills, headaches, nasal congestion, postnasal drip, a sore throat and wheezing (at night time). Pertinent negatives include no chest pain, fever, hemoptysis, myalgias, rash, shortness of breath or weight loss. Nothing aggravates the symptoms. Treatments tried: otc meds. The treatment provided mild relief. There is no history of asthma, bronchiectasis, environmental allergies or pneumonia.       Constitution: Positive for chills. Negative for fever.   HENT:  Positive for postnasal drip and sore throat.    Neck: Negative for neck pain and neck stiffness.   Cardiovascular:  Negative for chest pain.   Respiratory:  Positive for cough, sputum production and wheezing (at night time). Negative for chest tightness, bloody sputum and shortness of breath.    Gastrointestinal:  Negative for abdominal pain, vomiting and diarrhea.    Musculoskeletal:  Negative for pain and muscle ache.   Skin:  Negative for rash and wound.   Allergic/Immunologic: Negative for environmental allergies and itching.   Neurological:  Positive for headaches. Negative for numbness and tingling.      Objective:     Physical Exam   Constitutional: He is oriented to person, place, and time. He appears well-developed. He is cooperative.  Non-toxic appearance. He does not appear ill. No distress.   HENT:   Head: Normocephalic and atraumatic.   Ears:   Right Ear: Hearing, tympanic membrane, external ear and ear canal normal.   Left Ear: Hearing, tympanic membrane, external ear and ear canal normal.   Nose: Congestion present. No mucosal edema, rhinorrhea or nasal deformity. No epistaxis. Right sinus exhibits no maxillary sinus tenderness and no frontal sinus tenderness. Left sinus exhibits no maxillary sinus tenderness and no frontal sinus tenderness.   Mouth/Throat: Uvula is midline and mucous membranes are normal. No trismus in the jaw. Normal dentition. No uvula swelling. Posterior oropharyngeal erythema present. No oropharyngeal exudate or posterior oropharyngeal edema.   Eyes: Conjunctivae and lids are normal. No scleral icterus.   Neck: Trachea normal and phonation normal. Neck supple. No edema present. No erythema present. No neck rigidity present.   Cardiovascular: Normal rate, regular rhythm, normal heart sounds and normal pulses.   Pulmonary/Chest: Effort normal. No respiratory distress. He has no decreased breath sounds. He has wheezes (Faint expiratory wheeze in the upper lung fields). He has no rhonchi.   Abdominal: Normal appearance.   Musculoskeletal: Normal range of motion.         General: No deformity. Normal range of motion.   Neurological: He is alert and oriented to person, place, and time. He exhibits normal muscle tone. Coordination normal.   Skin: Skin is warm, dry, intact, not diaphoretic and not pale.   Psychiatric: His speech is normal and  behavior is normal. Judgment and thought content normal.   Nursing note and vitals reviewed.      Assessment:     1. Bronchospasm    2. Cough, unspecified type    3. Mixed hyperlipidemia    4. History of prediabetes    5. Paroxysmal atrial fibrillation    6. Thyroid disorder    7. Influenza A      Results for orders placed or performed in visit on 02/11/25   POCT Influenza A/B Molecular    Collection Time: 02/11/25 11:27 AM   Result Value Ref Range    POC Molecular Influenza A Ag Positive (A) Negative    POC Molecular Influenza B Ag Negative Negative     Acceptable Yes        Plan:       Bronchospasm  -     promethazine-dextromethorphan (PROMETHAZINE-DM) 6.25-15 mg/5 mL Syrp; Take 5 mLs by mouth every 6 (six) hours as needed (Cough).  Dispense: 118 mL; Refill: 0  -     predniSONE (DELTASONE) 20 MG tablet; Take 2 tablets (40 mg total) by mouth once daily. for 4 days  Dispense: 8 tablet; Refill: 0    Cough, unspecified type  -     POCT Influenza A/B Molecular    Mixed hyperlipidemia    History of prediabetes    Paroxysmal atrial fibrillation    Thyroid disorder    Influenza A          Medical Decision Making:   History:   Old Medical Records: I decided to obtain old medical records.  Old Records Summarized: records from clinic visits.  Clinical Tests:   Lab Tests: Ordered and Reviewed  Urgent Care Management:  A. Problem List:   -Acute: Bronchospasm, Flu A    -Chronic: Paroxysmal A-fib, HLD, thyroid disorder, prediabetes   B. Differential diagnosis: viral vs bacterial URI, pharyngitis, otitis, COVID 19, influenza, pneumonia  C. Diagnostic Testing Ordered: Flu   D. Diagnostic Testing Considered: None  E. Independent Historians: None  F. Urgent Care Midlevel Independent Results Interpretation: Flu A+  G. Radiology:  H. Review of Previous Medical Records:  I. Home Medications Reviewed  J. Social Determinants of Health considered  K. Medical Decision Making and Disposition:   Patient presents to the clinic  with complaints of URI like symptoms for the past several days now.  Reports that he felt much worse over the weekend however, his symptoms have since improved but his postnasal drip, sore throat and dry cough remain.  Denies chest pain or shortness of breath.  On exam, he is afebrile and nontoxic in appearance.  Faint expiratory wheezes noted on exam.  He has not fact tested positive for influenza A however, no longer and treatment window.  I have advised close follow-up with primary care strict ED precautions.  He verbalized understanding and agreed with this plan.            Patient Instructions   You have been prescribed a steroid today. Take the prescription as directed. Steroids can increase blood sugar. You can also have the following when taking steroids: flushing, jitteriness, weight gain, fluid retention, bone weakening. If you develop any adverse symptoms, stop taking the medication immediately.    Thank you for choosing [facility]!     Our goal in the Urgent Care is to always provide outstanding medical care. You may receive a survey by mail or e-mail in the next week regarding your experience today. We would greatly appreciate you completing and returning the survey. Your feedback provides us with a way to recognize our staff who provide very good care, and it helps us learn how to improve when your experience was below our aspiration of excellence.       We appreciate you trusting us with your medical care. We hope you feel better soon. We will be happy to take care of you for all of your future medical needs.    You must understand that you've received an Urgent Care treatment only and that you may be released before all your medical problems are known or treated. You, the patient, will arrange for follow up care as instructed.      Follow up with your PCP or specialty clinic as instructed in the next 2-3 days if not improved or as needed. You can call [phone] to schedule an  appointment with appropriate provider.      If you condition worsens, we recommend that you receive another evaluation at the emergency room immediately or contact your primary medical clinic's after hours call service to discuss your concerns.      Please return here or go to the Emergency Department for any concerns or worsening condition.

## 2025-02-11 NOTE — PATIENT INSTRUCTIONS
You have been prescribed a steroid today. Take the prescription as directed. Steroids can increase blood sugar. You can also have the following when taking steroids: flushing, jitteriness, weight gain, fluid retention, bone weakening. If you develop any adverse symptoms, stop taking the medication immediately.    Thank you for choosing [facility]!     Our goal in the Urgent Care is to always provide outstanding medical care. You may receive a survey by mail or e-mail in the next week regarding your experience today. We would greatly appreciate you completing and returning the survey. Your feedback provides us with a way to recognize our staff who provide very good care, and it helps us learn how to improve when your experience was below our aspiration of excellence.       We appreciate you trusting us with your medical care. We hope you feel better soon. We will be happy to take care of you for all of your future medical needs.    You must understand that you've received an Urgent Care treatment only and that you may be released before all your medical problems are known or treated. You, the patient, will arrange for follow up care as instructed.      Follow up with your PCP or specialty clinic as instructed in the next 2-3 days if not improved or as needed. You can call [phone] to schedule an appointment with appropriate provider.      If you condition worsens, we recommend that you receive another evaluation at the emergency room immediately or contact your primary medical clinic's after hours call service to discuss your concerns.      Please return here or go to the Emergency Department for any concerns or worsening condition.

## 2025-02-11 NOTE — LETTER
February 11, 2025      [facility] [place]  [address] [address], [address]  [place] [address]  Phone: [phone]  Fax: [phone]       Patient: Walt Lopez   YOB: 1965  Date of Visit: 02/11/2025    To Whom It May Concern:    Kamran Lopez  was at [facility] on 02/11/2025. He may return to work/school on 2/13/2025 with no restrictions. If you have any questions or concerns, or if I can be of further assistance, please do not hesitate to contact me.    Sincerely,    Tashia Barron PA-C

## 2025-03-20 ENCOUNTER — OFFICE VISIT (OUTPATIENT)
Dept: FAMILY MEDICINE | Facility: CLINIC | Age: 60
End: 2025-03-20
Payer: COMMERCIAL

## 2025-03-20 VITALS
SYSTOLIC BLOOD PRESSURE: 104 MMHG | TEMPERATURE: 98 F | HEIGHT: 72 IN | WEIGHT: 233.56 LBS | BODY MASS INDEX: 31.63 KG/M2 | HEART RATE: 77 BPM | DIASTOLIC BLOOD PRESSURE: 72 MMHG | OXYGEN SATURATION: 98 %

## 2025-03-20 DIAGNOSIS — E66.811 CLASS 1 OBESITY DUE TO EXCESS CALORIES WITH SERIOUS COMORBIDITY AND BODY MASS INDEX (BMI) OF 31.0 TO 31.9 IN ADULT: Primary | ICD-10-CM

## 2025-03-20 DIAGNOSIS — E66.09 CLASS 1 OBESITY DUE TO EXCESS CALORIES WITH SERIOUS COMORBIDITY AND BODY MASS INDEX (BMI) OF 31.0 TO 31.9 IN ADULT: Primary | ICD-10-CM

## 2025-03-20 PROCEDURE — 99999 PR PBB SHADOW E&M-EST. PATIENT-LVL IV: CPT | Mod: PBBFAC,,, | Performed by: NURSE PRACTITIONER

## 2025-03-20 RX ORDER — TIRZEPATIDE 10 MG/.5ML
10 INJECTION, SOLUTION SUBCUTANEOUS
Qty: 2 ML | Refills: 2 | Status: SHIPPED | OUTPATIENT
Start: 2025-03-20

## 2025-03-20 NOTE — PROGRESS NOTES
Subjective:       Patient ID: Walt Lopez is a 59 y.o. male.    Chief Complaint: Follow-up (2 months F/U)        HPI WITH ASSESSMENT AND PLAN OF CARE:      Patient is a 59 year old white male with paroxysmal AFIB S/P Cardiac Ablation, Hypothyroidism and Prediabetes followed by Endocrinology Dr. Alexander, and Mixed Hyperlipidemia that is here today for 2 month follow up for medication check.        Obesity  Body mass index is 31.68 kg/m².  Working on lifestyle modifications  Denies personal and family history on pancreatitis or thyroid cancer.   Started Zepbound 2.5  mg weekly x 4 weeks on 9/5/2024  Increase to 5 mg injection weekly on 10/1/2024  23 lb weight loss Sept-December 2024.  Gained 5 pounds from 12/26/2024 to 1/27/2025  INCREASED Zepbound to 7.5 mg injection weekly 1/27/25.  Lost 7 pounds since 2/11/25 for total loss of 25 pounds since start of medication  INCREASE Zepbound to 10 mg injection weekly 3/20/25  Follow up in 12 weeks  Will have updated bloodwork from his endocrinologist for next visit.    Wt Readings from Last 3 Encounters:   03/20/25 106 kg (233 lb 9.2 oz)   02/11/25 108.9 kg (240 lb 1.3 oz)   01/27/25 108.9 kg (240 lb 1.3 oz)            CHRONIC PROBLEM REVIEW:     Paroxysmal AFIB  S/P cardiac ablation in past  Followed by [facility] Electrophysiology  Recent episode 5/29/2024 requiring hospitalization  Transferred to [facility] and followed by Electrophysiologist Dr. Chaudhari  F/U on 6/17/24 by [facility] Electrophysiologist Dr. Chaudhari progress notes:  ASSESSMENT  Plan  - Testing results reviewed patient  Stress test with no perfusion abnormalities appreciated.  - Prior coronary calcium scoring 6.  - PAF, will initiate pill in pocket strategy. Flecainide 300mg to be taken as needed for atrial fibrillation recurrence. Advised patient to present to ER for evaluation prior to taking.  - Patient can also utilize diltiazem for aid in heart rate control should events recur.  - Will repeat coronary  calcium scoring, patient request performing an [facility].  Repeat EKG 12/17/2024:  Normal Sinus Rhythm.        Mixed Hyperlipidemia  Currently taking Rosuvastatin 20 mg daily.  States he is working on lifestyle modifications and diet   Last LDL in my system September 2024 - 69.2  Outside lab 11/8/2024:  LDL 80  Stable.             History of Prediabetes  HgbA1C 5.7% in May 2023  Currently 5.2% 11/8/24 labs  Followed by Endocrinology Dr. Alexander  See outside lab results below.        Hypothyroidism due to Hashimoto's  Followed by Endocrinology Dr. Alexander  Last appt with Endocrinology 11/20/2024  Synthroid Tablet, 200 MCG, 1 tablet in the morning on an empty stomach, Oral, 1 tab M- Sat and 1/2 on Sunday, 90 days, 90, Refills 3.        Notes: stable  1. ON Synthroid 200 mcg daily - MALOU, M- Sat and 1/2 on Sunday  2. discussed how to take  3. monitor free t4     4.  Repeat labs and follow up in 6 months.    Vitals:    03/20/25 1423   BP: 104/72   BP Location: Left arm   Patient Position: Sitting   Pulse: 77   Temp: 97.9 °F (36.6 °C)   TempSrc: Temporal   SpO2: 98%   Weight: 106 kg (233 lb 9.2 oz)   Height: 6' (1.829 m)         Diagnoses this Encounter:         ICD-10-CM ICD-9-CM   1. Class 1 obesity due to excess calories with serious comorbidity and body mass index (BMI) of 31.0 to 31.9 in adult  E66.811 278.00    E66.09 V85.31    Z68.31        Orders Placed This Encounter    tirzepatide, weight loss, (ZEPBOUND) 10 mg/0.5 mL PnIj        Follow up in about 12 weeks (around 6/12/2025) for labs from endocrinology and review of all chronic problems.     Patient's Medications   New Prescriptions    TIRZEPATIDE, WEIGHT LOSS, (ZEPBOUND) 10 MG/0.5 ML PNIJ    Inject 10 mg into the skin every 7 days.   Previous Medications    ASPIRIN (ECOTRIN) 81 MG EC TABLET    Take 81 mg by mouth once daily.    CETIRIZINE (ZYRTEC) 10 MG TABLET    Take 1 tablet (10 mg total) by mouth once daily.    DIAZEPAM  (VALIUM) 2 MG TABLET    Take 2 mg by mouth.    FLECAINIDE (TAMBOCOR) 150 MG TAB    Take 150 mg by mouth as needed.    FLUTICASONE PROPIONATE (FLONASE) 50 MCG/ACTUATION NASAL SPRAY    2 sprays (100 mcg total) by Each Nostril route once daily.    MULTIVIT-MIN/FA/LYCOPEN/LUTEIN (CENTRUM SILVER MEN ORAL)    Take 1 tablet by mouth once daily.    OMEGA-3 FATTY ACIDS-FISH -1,200 MG CPDR    Take 1,000 mg by mouth.    ROSUVASTATIN (CRESTOR) 20 MG TABLET    Take 20 mg by mouth every evening.    SYNTHROID 200 MCG TABLET    Take 200 mcg by mouth before breakfast.   Modified Medications    No medications on file   Discontinued Medications    TIRZEPATIDE, WEIGHT LOSS, (ZEPBOUND) 7.5 MG/0.5 ML PNIJ    Inject 7.5 mg into the skin every 7 days.         Review of Systems   Constitutional: Negative.    Respiratory: Negative.     Cardiovascular: Negative.    Gastrointestinal: Negative.    Neurological: Negative.          Objective:        Physical Exam  Constitutional:       Appearance: Normal appearance. He is obese.      Comments: Body mass index is 31.68 kg/m².     Cardiovascular:      Rate and Rhythm: Normal rate and regular rhythm.      Heart sounds: Normal heart sounds. No murmur heard.  Pulmonary:      Effort: Pulmonary effort is normal. No respiratory distress.      Breath sounds: Normal breath sounds.   Neurological:      Mental Status: He is oriented to person, place, and time.   Psychiatric:         Behavior: Behavior normal.             Past Medical History:   Diagnosis Date    Atrial fibrillation     History of prior ablation treatment     Hypothyroidism     S/P ablation of atrial fibrillation        Past Surgical History:   Procedure Laterality Date    CARDIAC ELECTROPHYSIOLOGY STUDY AND ABLATION      COLONOSCOPY  12/22/2017    internal hemorrhoids, otherwise normal = repeat in 10 years - Dr. Cally Fuchs with [facility]    ESOPHAGOGASTRODUODENOSCOPY N/A 10/28/2021    Procedure: EGD (ESOPHAGOGASTRODUODENOSCOPY);   Surgeon: Patricia Soni MD;  Location: [facility] ENDO;  Service: Endoscopy;  Laterality: N/A;    KNEE SURGERY Right     NASAL SEPTOPLASTY N/A 07/29/2021    Procedure: SEPTOPLASTY, NOSE;  Surgeon: Josué Villafuerte MD;  [facility];  Service: ENT;  Laterality: N/A;    NASAL TURBINATE REDUCTION Bilateral 07/29/2021    Procedure: REDUCTION, NASAL TURBINATE;  Surgeon: Josué Villafuerte MD;  [facility];  Service: ENT;  Laterality: Bilateral;    SHOULDER ARTHROSCOPY Right        Family History   Problem Relation Name Age of Onset    Hypertension Mother      Atrial fibrillation Mother      Cancer Father          esophageal cancer    No Known Problems Brother         Social History     Socioeconomic History    Marital status:    Occupational History    Occupation:    Tobacco Use    Smoking status: Never     Passive exposure: Never    Smokeless tobacco: Never   Substance and Sexual Activity    Alcohol use: Yes     Comment: once a month or less    Drug use: Never    Sexual activity: Yes     Partners: Female     Social Drivers of Health     Financial Resource Strain: Low Risk  (3/13/2025)    Overall Financial Resource Strain (CARDIA)     Difficulty of Paying Living Expenses: Not hard at all   Food Insecurity: No Food Insecurity (3/13/2025)    Hunger Vital Sign     Worried About Running Out of Food in the Last Year: Never true     Ran Out of Food in the Last Year: Never true   Transportation Needs: No Transportation Needs (3/13/2025)    PRAPARE - Transportation     Lack of Transportation (Medical): No     Lack of Transportation (Non-Medical): No   Physical Activity: Sufficiently Active (3/13/2025)    Exercise Vital Sign     Days of Exercise per Week: 4 days     Minutes of Exercise per Session: 40 min   Stress: No Stress Concern Present (3/13/2025)    [employer] [place] [employer] - Occupational Stress Questionnaire     Feeling of Stress : Not at all   Housing Stability: Low  Risk  (3/13/2025)    Housing Stability Vital Sign     Unable to Pay for Housing in the Last Year: No     Number of Times Moved in the Last Year: 0     Homeless in the Last Year: No

## 2025-04-14 ENCOUNTER — PATIENT MESSAGE (OUTPATIENT)
Dept: CARDIOLOGY | Facility: CLINIC | Age: 60
End: 2025-04-14
Payer: COMMERCIAL

## 2025-04-15 ENCOUNTER — PATIENT MESSAGE (OUTPATIENT)
Dept: CARDIOLOGY | Facility: CLINIC | Age: 60
End: 2025-04-15

## 2025-04-15 ENCOUNTER — OFFICE VISIT (OUTPATIENT)
Dept: CARDIOLOGY | Facility: CLINIC | Age: 60
End: 2025-04-15
Payer: COMMERCIAL

## 2025-04-15 VITALS
HEIGHT: 72 IN | DIASTOLIC BLOOD PRESSURE: 80 MMHG | BODY MASS INDEX: 31.44 KG/M2 | OXYGEN SATURATION: 98 % | WEIGHT: 232.13 LBS | SYSTOLIC BLOOD PRESSURE: 124 MMHG | HEART RATE: 73 BPM

## 2025-04-15 DIAGNOSIS — Z86.79 S/P ABLATION OF ATRIAL FLUTTER: ICD-10-CM

## 2025-04-15 DIAGNOSIS — E66.09 CLASS 1 OBESITY DUE TO EXCESS CALORIES WITH SERIOUS COMORBIDITY AND BODY MASS INDEX (BMI) OF 32.0 TO 32.9 IN ADULT: ICD-10-CM

## 2025-04-15 DIAGNOSIS — E66.811 CLASS 1 OBESITY DUE TO EXCESS CALORIES WITH SERIOUS COMORBIDITY AND BODY MASS INDEX (BMI) OF 32.0 TO 32.9 IN ADULT: ICD-10-CM

## 2025-04-15 DIAGNOSIS — I48.0 PAROXYSMAL ATRIAL FIBRILLATION WITH RVR: Primary | ICD-10-CM

## 2025-04-15 DIAGNOSIS — Z98.890 S/P ABLATION OF ATRIAL FLUTTER: ICD-10-CM

## 2025-04-15 DIAGNOSIS — E78.2 MIXED HYPERLIPIDEMIA: ICD-10-CM

## 2025-04-15 DIAGNOSIS — E06.3 HYPOTHYROIDISM DUE TO HASHIMOTO THYROIDITIS: ICD-10-CM

## 2025-04-15 PROCEDURE — 99214 OFFICE O/P EST MOD 30 MIN: CPT | Mod: S$GLB,,,

## 2025-04-15 PROCEDURE — 99999 PR PBB SHADOW E&M-EST. PATIENT-LVL III: CPT | Mod: PBBFAC,,,

## 2025-04-15 NOTE — ASSESSMENT & PLAN NOTE
-Obtain records from  [facility]- EP   S/P cardiac ablation in past  Followed by Electrophysiology Dr. Argueta - last appt 9/15/2023  episode 5/29/2024 requiring hospitalization  Seen on 6/4/24 by [facility] Cardiologist Dr. Chaudhari progress notes:  -continue  81 mg aspirin.  - Continue Synthroid for thyroid management, per Endocrine  -Will continue to follow with Dr. Almanza - EP w [facility]

## 2025-04-15 NOTE — ASSESSMENT & PLAN NOTE
Body mass index is 31.48 kg/m². Morbid obesity complicates all aspects of disease management from diagnostic modalities to treatment. Weight loss encouraged and health benefits explained to patient.   - Currently on Zepbound

## 2025-04-15 NOTE — PROGRESS NOTES
Subjective:    Patient ID:  Walt Lopez is a 59 y.o. male who presents for evaluation of No chief complaint on file.      PCP: Love Carlisle NP     Referring Provider:     HPI: Patient is a 58 year old white male with paroxysmal AFIB S/P Cardiac Ablation, Hypothyroidism and Prediabetes followed by Endocrinology Dr. Alexander, and Mixed Hyperlipidemia who presents today for f/u appt. He was last seen on 9/27/24 for f/u appt and was continued on medical therapy.   He plans to continues EP services with [facility] EP services Dr. Almanza.   Patient reports episodes of feeling extra heart beat, but denies lightheadedness or dizziness.   Patient denies CP, SOB, palpitations, orthopnea, PND, presyncope, LOC, swelling, or claudication. Patient monitors home BP and ranges 130s. Patient reports medication compliance without side effects  Patient does exercise regularly and cut his grass without limitations.         Past Medical History:   Diagnosis Date    Atrial fibrillation     History of prior ablation treatment     Hypothyroidism     S/P ablation of atrial fibrillation      Past Surgical History:   Procedure Laterality Date    CARDIAC ELECTROPHYSIOLOGY STUDY AND ABLATION      COLONOSCOPY  12/22/2017    internal hemorrhoids, otherwise normal = repeat in 10 years - Dr. Cally Fuchs with [facility]    ESOPHAGOGASTRODUODENOSCOPY N/A 10/28/2021    Procedure: EGD (ESOPHAGOGASTRODUODENOSCOPY);  Surgeon: Patricia Soni MD;  Location: [facility];  Service: Endoscopy;  Laterality: N/A;    KNEE SURGERY Right     NASAL SEPTOPLASTY N/A 07/29/2021    Procedure: SEPTOPLASTY, NOSE;  Surgeon: Josué Villafuerte MD;  [facility];  Service: ENT;  Laterality: N/A;    NASAL TURBINATE REDUCTION Bilateral 07/29/2021    Procedure: REDUCTION, NASAL TURBINATE;  Surgeon: Josué Villafuerte MD;  Location: [facility] OR [address] [place] [address];  Service: ENT;  Laterality: Bilateral;    SHOULDER ARTHROSCOPY Right      Social History     Socioeconomic  History    Marital status:    Occupational History    Occupation:    Tobacco Use    Smoking status: Never     Passive exposure: Never    Smokeless tobacco: Never   Substance and Sexual Activity    Alcohol use: Yes     Comment: once a month or less    Drug use: Never    Sexual activity: Yes     Partners: Female     Social Drivers of Health     Financial Resource Strain: Low Risk  (3/13/2025)    Overall Financial Resource Strain (CARDIA)     Difficulty of Paying Living Expenses: Not hard at all   Food Insecurity: No Food Insecurity (3/13/2025)    Hunger Vital Sign     Worried About Running Out of Food in the Last Year: Never true     Ran Out of Food in the Last Year: Never true   Transportation Needs: No Transportation Needs (3/13/2025)    PRAPARE - Transportation     Lack of Transportation (Medical): No     Lack of Transportation (Non-Medical): No   Physical Activity: Sufficiently Active (3/13/2025)    Exercise Vital Sign     Days of Exercise per Week: 4 days     Minutes of Exercise per Session: 40 min   Stress: No Stress Concern Present (3/13/2025)    [employer] [place] [employer] - Occupational Stress Questionnaire     Feeling of Stress : Not at all   Housing Stability: Low Risk  (3/13/2025)    Housing Stability Vital Sign     Unable to Pay for Housing in the Last Year: No     Number of Times Moved in the Last Year: 0     Homeless in the Last Year: No     Family History   Problem Relation Name Age of Onset    Hypertension Mother      Atrial fibrillation Mother      Cancer Father          esophageal cancer    No Known Problems Brother         Review of patient's allergies indicates:  No Known Allergies    Medication List with Changes/Refills   Current Medications    ASPIRIN (ECOTRIN) 81 MG EC TABLET    Take 81 mg by mouth once daily.    CETIRIZINE (ZYRTEC) 10 MG TABLET    Take 1 tablet (10 mg total) by mouth once daily.    FLECAINIDE (TAMBOCOR) 150 MG TAB    Take 150 mg by mouth as needed.     FLUTICASONE PROPIONATE (FLONASE) 50 MCG/ACTUATION NASAL SPRAY    2 sprays (100 mcg total) by Each Nostril route once daily.    MULTIVIT-MIN/FA/LYCOPEN/LUTEIN (CENTRUM SILVER MEN ORAL)    Take 1 tablet by mouth once daily.    OMEGA-3 FATTY ACIDS-FISH -1,200 MG CPDR    Take 1,000 mg by mouth.    ROSUVASTATIN (CRESTOR) 20 MG TABLET    Take 20 mg by mouth every evening.    SYNTHROID 200 MCG TABLET    Take 200 mcg by mouth before breakfast.    TIRZEPATIDE, WEIGHT LOSS, (ZEPBOUND) 10 MG/0.5 ML PNIJ    Inject 10 mg into the skin every 7 days.   Discontinued Medications    DIAZEPAM (VALIUM) 2 MG TABLET    Take 2 mg by mouth.       Review of Systems   Constitutional: Negative for diaphoresis and fever.   HENT:  Negative for congestion and hearing loss.    Eyes:  Negative for blurred vision and pain.   Cardiovascular:  Negative for chest pain, claudication, dyspnea on exertion, leg swelling, near-syncope, palpitations and syncope.   Respiratory:  Negative for shortness of breath and sleep disturbances due to breathing.    Hematologic/Lymphatic: Negative for bleeding problem. Does not bruise/bleed easily.   Skin:  Negative for color change and poor wound healing.   Gastrointestinal:  Negative for abdominal pain and nausea.   Genitourinary:  Negative for bladder incontinence and flank pain.   Neurological:  Negative for focal weakness and light-headedness.        Objective:   /80 (BP Location: Left arm, Patient Position: Sitting)   Pulse 73   Ht 6' (1.829 m)   Wt 105.3 kg (232 lb 2.3 oz)   SpO2 98%   BMI 31.48 kg/m²    Physical Exam  Constitutional:       Appearance: He is well-developed. He is not diaphoretic.   HENT:      Head: Normocephalic and atraumatic.   Eyes:      General: No scleral icterus.     Pupils: Pupils are equal, round, and reactive to light.   Neck:      Vascular: No JVD.   Cardiovascular:      Rate and Rhythm: Normal rate and regular rhythm.      Pulses: Intact distal pulses.      Heart  sounds: S1 normal and S2 normal. No murmur heard.     No friction rub. No gallop.   Pulmonary:      Effort: Pulmonary effort is normal. No respiratory distress.      Breath sounds: Normal breath sounds. No wheezing or rales.   Chest:      Chest wall: No tenderness.   Abdominal:      General: Bowel sounds are normal. There is no distension.      Palpations: Abdomen is soft. There is no mass.      Tenderness: There is no abdominal tenderness. There is no rebound.   Musculoskeletal:         General: No tenderness. Normal range of motion.      Cervical back: Normal range of motion and neck supple.   Skin:     General: Skin is warm and dry.      Coloration: Skin is not pale.   Neurological:      Mental Status: He is alert and oriented to person, place, and time.      Coordination: Coordination normal.      Deep Tendon Reflexes: Reflexes normal.   Psychiatric:         Behavior: Behavior normal.         Judgment: Judgment normal.           Assessment:       1. Paroxysmal atrial fibrillation with RVR    2. Mixed hyperlipidemia    3. S/P ablation of atrial flutter    4. Class 1 obesity due to excess calories with serious comorbidity and body mass index (BMI) of 32.0 to 32.9 in adult    5. Hypothyroidism due to Hashimoto thyroiditis           Plan:         Paroxysmal atrial fibrillation with RVR  Paroxysmal AFIB  S/P cardiac ablation in past  Followed by Electrophysiology Dr. Argueta - last appt 9/15/2023  episode 5/29/2024 requiring hospitalization  Seen on 6/4/24 by [facility] Cardiologist Dr. Chaudhari progress notes:  -continue  81 mg aspirin.  - Continue Synthroid for thyroid management, per Endocrine  -Will continue to follow with Dr. Almanza - [facility] w [facility]     Mixed hyperlipidemia  -LDL 66 4/10/25  -controlled   -Continue - Crestor 20 mg       S/P ablation of atrial flutter  -Obtain records from  [facility]- EP   S/P cardiac ablation in past  Followed by Electrophysiology Dr. Argueta - last appt 9/15/2023  episode 5/29/2024 requiring  hospitalization  Seen on 6/4/24 by [facility] Cardiologist Dr. Chaudhari progress notes:  -continue  81 mg aspirin.  - Continue Synthroid for thyroid management, per Endocrine  -Will continue to follow with Dr. Almanza - EP w [facility]     Class 1 obesity due to excess calories with serious comorbidity and body mass index (BMI) of 32.0 to 32.9 in adult  Body mass index is 31.48 kg/m². Morbid obesity complicates all aspects of disease management from diagnostic modalities to treatment. Weight loss encouraged and health benefits explained to patient.   - Currently on Zepbound     Hypothyroidism  -Followed by Endocrine   -continue medical therapy       Total duration of face to face visit time 30 minutes.  Total time spent counseling greater than fifty percent of total visit time.  Counseling included discussion regarding imaging findings, diagnosis, possibilities, treatment options, risks and benefits.  The patient had many questions regarding the options and long-term effects      He Gonsales, MILO  Cardiology

## 2025-04-15 NOTE — ASSESSMENT & PLAN NOTE
Paroxysmal AFIB  S/P cardiac ablation in past  Followed by Electrophysiology Dr. Argueta - last appt 9/15/2023  episode 5/29/2024 requiring hospitalization  Seen on 6/4/24 by [facility] Cardiologist Dr. Chaudhari progress notes:  -continue  81 mg aspirin.  - Continue Synthroid for thyroid management, per Endocrine  -Will continue to follow with Dr. Almanza - [facility]

## 2025-05-04 ENCOUNTER — PATIENT MESSAGE (OUTPATIENT)
Dept: FAMILY MEDICINE | Facility: CLINIC | Age: 60
End: 2025-05-04
Payer: COMMERCIAL

## 2025-05-05 ENCOUNTER — CLINICAL SUPPORT (OUTPATIENT)
Dept: OTOLARYNGOLOGY | Facility: CLINIC | Age: 60
End: 2025-05-05
Payer: COMMERCIAL

## 2025-05-05 ENCOUNTER — OFFICE VISIT (OUTPATIENT)
Dept: OTOLARYNGOLOGY | Facility: CLINIC | Age: 60
End: 2025-05-05
Payer: COMMERCIAL

## 2025-05-05 VITALS
WEIGHT: 230.25 LBS | HEART RATE: 71 BPM | BODY MASS INDEX: 31.23 KG/M2 | DIASTOLIC BLOOD PRESSURE: 72 MMHG | SYSTOLIC BLOOD PRESSURE: 115 MMHG

## 2025-05-05 DIAGNOSIS — H69.93 DYSFUNCTION OF BOTH EUSTACHIAN TUBES: ICD-10-CM

## 2025-05-05 DIAGNOSIS — H93.11 TINNITUS OF RIGHT EAR: Primary | ICD-10-CM

## 2025-05-05 DIAGNOSIS — H61.23 BILATERAL IMPACTED CERUMEN: ICD-10-CM

## 2025-05-05 DIAGNOSIS — J30.89 NON-SEASONAL ALLERGIC RHINITIS, UNSPECIFIED TRIGGER: ICD-10-CM

## 2025-05-05 PROCEDURE — 99999 PR PBB SHADOW E&M-EST. PATIENT-LVL III: CPT | Mod: PBBFAC,,, | Performed by: NURSE PRACTITIONER

## 2025-05-05 PROCEDURE — 99999 PR PBB SHADOW E&M-EST. PATIENT-LVL I: CPT | Mod: PBBFAC,,,

## 2025-05-05 PROCEDURE — G0268 REMOVAL OF IMPACTED WAX MD: HCPCS | Mod: S$GLB,,, | Performed by: NURSE PRACTITIONER

## 2025-05-05 PROCEDURE — 99214 OFFICE O/P EST MOD 30 MIN: CPT | Mod: 25,S$GLB,, | Performed by: NURSE PRACTITIONER

## 2025-05-05 PROCEDURE — 92567 TYMPANOMETRY: CPT | Mod: S$GLB,,,

## 2025-05-05 PROCEDURE — 92557 COMPREHENSIVE HEARING TEST: CPT | Mod: S$GLB,,,

## 2025-05-05 RX ORDER — FLUTICASONE PROPIONATE 50 MCG
2 SPRAY, SUSPENSION (ML) NASAL DAILY
Qty: 9.9 ML | Refills: 11 | Status: SHIPPED | OUTPATIENT
Start: 2025-05-05

## 2025-05-05 RX ORDER — LEVOCETIRIZINE DIHYDROCHLORIDE 5 MG/1
5 TABLET, FILM COATED ORAL NIGHTLY
Qty: 30 TABLET | Refills: 11 | Status: SHIPPED | OUTPATIENT
Start: 2025-05-05 | End: 2026-05-05

## 2025-05-05 RX ORDER — METHYLPREDNISOLONE 4 MG/1
TABLET ORAL
Qty: 21 EACH | Refills: 0 | Status: SHIPPED | OUTPATIENT
Start: 2025-05-05 | End: 2025-05-26

## 2025-05-05 RX ORDER — AZELASTINE 1 MG/ML
1 SPRAY, METERED NASAL 2 TIMES DAILY
Qty: 30 ML | Refills: 11 | Status: SHIPPED | OUTPATIENT
Start: 2025-05-05 | End: 2026-05-05

## 2025-05-05 NOTE — PROGRESS NOTES
"Walt Lopez, a 59 y.o. male was seen today in the clinic for an audiologic evaluation. The patient's primary complaint was "humming" sound in the right ear which he perceives to have worsened recently. Mr. Lopez denies ear pain and reports no change in hearing perception. Previous hearing evaluation results indicated normal hearing with a notched configuration at 4000 Hz. Patient has history of cerumen impaction. History of firearms use with consistent use of hearing protection.    Otoscopy revealed excessive cerumen, bilaterally. Patient seen by ENT provider for ear cleaning then returned to audiology. Pure tone testing revealed essentially normal hearing with a minimal notch at 4000 Hz, bilaterally. Speech reception thresholds were obtained at 10 dBHL in the right ear and 10 dBHL in the left ear. Speech discrimination scores were 100% in the right ear and 100% in the left ear. Tympanometry revealed Type C tympanograms in both ears.     Recommendations:  Otologic evaluation  Annual audiologic evaluation  Hearing protection in noise              "

## 2025-05-05 NOTE — PROGRESS NOTES
Patient ID: Walt Lopez is a 59 y.o. y.o. male    Chief Complaint:   Chief Complaint   Patient presents with    Cerumen Impaction    Hearing Loss     New concerns    Tinnitus     Right ear only     HPI 6/6/2024: Patient is self-referred for evaluation of a possible wax impaction in bilateral ears.  he has complaints of hearing loss in the affected ears, but denies pain or drainage.  This has been an issue in the past.  The patient has been using any sort of ear drop to soften the wax.  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 Denies tinnitus and dizziness.     Interval HPI 9/4/2024:  Mr. Lopez is here with c/o nasal congestion for three months. He has been using the nasal saline flushes and Astepro along with taking Claritin. Denies runny nose, sinus pressure and headaches.   The patient with hx of chronic sinusitis and papilloma of nose. He is s/p endoscopic sinus surgery with Dr. Villafuerte in 2021.     Interval HPI 5/5/2025:  He reports of tinnitus in the right ear. Would go away if he pushes on his ear. He states that tinnitus comes and goes. He denies hearing loss, ear pain or ear drainage. He reports having a flu a month ago. He has nasal congestion, post nasal drip and cough. He has been on Flonase and Claritin (out of Zyrtec) daily.        Review of Systems   Constitutional: Negative for fever, chills, fatigue and unexpected weight change.   HENT: Positive for ear blockage. Negative for hearing loss, nosebleeds, congestion, sore throat, facial swelling, rhinorrhea, sneezing, trouble swallowing, dental problem, voice change, postnasal drip, sinus pressure, tinnitus and ear discharge.    Eyes: Negative for redness, itching and visual disturbance.   Respiratory: Negative for cough, choking and wheezing.    Cardiovascular: Negative for chest pain and palpitations.   Gastrointestinal: Negative for abdominal pain.        No reflux.   Musculoskeletal: Negative for gait problem.    Skin: Negative for rash.   Neurological: Negative for dizziness, light-headedness and headaches.     Past Medical History:   Diagnosis Date    Atrial fibrillation     History of prior ablation treatment     Hypothyroidism     S/P ablation of atrial fibrillation      Past Surgical History:   Procedure Laterality Date    CARDIAC ELECTROPHYSIOLOGY STUDY AND ABLATION      COLONOSCOPY  12/22/2017    internal hemorrhoids, otherwise normal = repeat in 10 years - Dr. Cally Fuchs with [facility]    ESOPHAGOGASTRODUODENOSCOPY N/A 10/28/2021    Procedure: EGD (ESOPHAGOGASTRODUODENOSCOPY);  Surgeon: aPtricia Soni MD;  Location: [facility] ENDO;  Service: Endoscopy;  Laterality: N/A;    KNEE SURGERY Right     NASAL SEPTOPLASTY N/A 07/29/2021    Procedure: SEPTOPLASTY, NOSE;  Surgeon: Josué Villafuerte MD;  Location: [facility] OR [address] [place] [address];  Service: ENT;  Laterality: N/A;    NASAL TURBINATE REDUCTION Bilateral 07/29/2021    Procedure: REDUCTION, NASAL TURBINATE;  Surgeon: Josué Villafuerte MD;  [facility];  Service: ENT;  Laterality: Bilateral;    SHOULDER ARTHROSCOPY Right      Social History     Socioeconomic History    Marital status:    Occupational History    Occupation:    Tobacco Use    Smoking status: Never     Passive exposure: Never    Smokeless tobacco: Never   Substance and Sexual Activity    Alcohol use: Yes     Comment: once a month or less    Drug use: Never    Sexual activity: Yes     Partners: Female     Social Drivers of Health     Financial Resource Strain: Low Risk  (3/13/2025)    Overall Financial Resource Strain (CARDIA)     Difficulty of Paying Living Expenses: Not hard at all   Food Insecurity: No Food Insecurity (3/13/2025)    Hunger Vital Sign     Worried About Running Out of Food in the Last Year: Never true     Ran Out of Food in the Last Year: Never true   Transportation Needs: No Transportation Needs (3/13/2025)    PRAPARE - Transportation     Lack of Transportation  (Medical): No     Lack of Transportation (Non-Medical): No   Physical Activity: Sufficiently Active (3/13/2025)    Exercise Vital Sign     Days of Exercise per Week: 4 days     Minutes of Exercise per Session: 40 min   Stress: No Stress Concern Present (3/13/2025)    [employer] - Occupational Stress Questionnaire     Feeling of Stress : Not at all   Housing Stability: Low Risk  (3/13/2025)    Housing Stability Vital Sign     Unable to Pay for Housing in the Last Year: No     Number of Times Moved in the Last Year: 0     Homeless in the Last Year: No     Family History   Problem Relation Name Age of Onset    Hypertension Mother      Atrial fibrillation Mother      Cancer Father          esophageal cancer    No Known Problems Brother         Objective:      Vitals:    05/05/25 1307   BP: 115/72   Pulse: 71         Constitutional: Well appearing / communicating without difficutly.  NAD.  Eyes: EOM I Bilaterally  Head/Face: Normocephalic. Negative paranasal sinus pressure/tenderness.  Salivary glands WNL.  House Brackmann I Bilaterally.    Right Ear: Auricle normal appearance. External Auditory Canal with cerumen impaction.  EAC within normal limits no lesions or masses,TM w/o masses/lesions/perforations. TM mobility noted.   Left Ear: Auricle normal appearance. External Auditory Canal with cerumen impaction. TM w/o masses/lesions/perforations. TM mobility noted.  Nose: No gross nasal septal deviation. Inferior Turbinates 3+ bilaterally. No septal perforation. No masses/lesions. External nasal skin appears normal without masses/lesions.   Oral Cavity: Gingiva/lips within normal limits.  Dentition/gingiva healthy appearing. Mucus membranes moist. Floor of mouth soft, no masses palpated. Oral Tongue mobile. Hard Palate appears normal.    Oropharynx: Base of tongue appears normal. No masses/lesions noted. Tonsillar fossa/pharyngeal wall without lesions. Posterior oropharynx WNL.  Soft palate  without masses. Midline uvula.   Neck/Lymphatic: No LAD I-VI bilaterally.  No thyromegaly.  No masses noted on exam.    Mirror laryngoscopy/nasopharyngoscopy: Active gag reflex.  Unable to perform.    Neuro/Psychiatric: AOx3.  Normal mood and affect.   Cardiovascular: Normal carotid pulses bilaterally, no increasing jugular venous distention noted at cervical region bilaterally.    Respiratory: Normal respiratory effort, no stridor, no retractions noted.      Ear Cerumen Removal     Date/Time: 5/5/2025 1:40 PM     Performed by: Leonie Cazares NP  Authorized by: Leonie Cazares NP    Consent Done?:  Yes (Verbal)     Local anesthetic:  None  Location details:  Both ears  Procedure type: curette    Procedure type comment:  Suction  Cerumen  Removal Results:  Cerumen completely removed  Patient tolerance:  Patient tolerated the procedure well with no immediate complications      Audiogram interpreted personally by me and discussed in detail with the patient today.   Pure tone testing revealed essentially normal hearing with a minimal notch at 4000 Hz, bilaterally. Speech reception thresholds were obtained at 10 dBHL in the right ear and 10 dBHL in the left ear. Speech discrimination scores were 100% in the right ear and 100% in the left ear. Tympanometry revealed Type C tympanograms in both ears.           Assessment:         ICD-10-CM ICD-9-CM    1. Tinnitus of right ear  H93.11 388.30       2. Dysfunction of both eustachian tubes  H69.93 381.81       3. Bilateral impacted cerumen  H61.23 380.4 Ear Cerumen Removal      4. Non-seasonal allergic rhinitis, unspecified trigger  J30.89 477.8                Plan:       Cerumen impaction:  Removed under microscopy today without difficulty.  I would recommend the use of a wax softening drop, either over the counter Debrox or mineral oil, on a weekly basis.  I also instructed the patient to avoid Qtips.    -audiogram revealed essentially normal hearing in bilateral ears.    -We also discussed that tinnitus is most often caused by a hearing loss, and that as the hair cells are damaged, either genetic or as a result of loud noise exposure, they then cause tinnitus.  Some patients find that restricting the salt or caffeine in their diet helps.  Tinnitus tends to be louder in times of stress and fatigue, and may decrease with time.  Sound machines may also be an effective masking technique if needed at night.    -We had a long discussion regarding the anatomy and function of the eustachian tube.  We discussed that the eustachian tube acts as a pump to keep the appropriate amount of pressure behind the ear drum.  I gave the patient a prescription for a nasal steroid spray to be used on a daily basis, and we discussed that it will take 2-3 weeks of daily use to achieve maximal effectiveness.  We also discussed otologic valsalva daily for gently depressurizing the middle ear.    -start on Medrol dosepak  -start on Xyzal 5 mg daily  -continue on Flonase 2 sprays in each nostril daily  -continue on azelastine nasal spray  -continue with nasal saline rinses        Leonie Cazares NP        Answers submitted by the patient for this visit:  Review of Symptoms Questionnaire  (Submitted on 9/4/2024)  None of these: Yes  sinus pressure : Yes  Sinus infection(s)?: Yes  None of these : Yes  Snoring?: Yes  None of these : Yes  heartburn: Yes  Acid Reflux?: Yes  None of these: Yes  None of these : Yes  None of these: Yes  None of these : Yes  None of these: Yes  None of these: Yes  None of these: Yes      Answers submitted by the patient for this visit:  Review of Symptoms Questionnaire  (Submitted on 5/4/2025)  None of these: Yes  Sinus infection(s)?: Yes  None of these : Yes  cough: Yes  None of these : Yes  None of these: Yes  None of these: Yes  None of these: Yes  None of these : Yes  Seasonal Allergies?: Yes  None of these : Yes  None of these: Yes  None of these: Yes  None of these: Yes

## 2025-05-05 NOTE — PROCEDURES
Ear Cerumen Removal    Date/Time: 5/5/2025 1:40 PM    Performed by: Leonie Cazares NP  Authorized by: Leonie Cazares NP    Consent Done?:  Yes (Verbal)    Local anesthetic:  None  Location details:  Both ears  Procedure type: curette    Procedure type comment:  Suction  Cerumen  Removal Results:  Cerumen completely removed  Patient tolerance:  Patient tolerated the procedure well with no immediate complications

## 2025-05-14 ENCOUNTER — TELEPHONE (OUTPATIENT)
Dept: FAMILY MEDICINE | Facility: CLINIC | Age: 60
End: 2025-05-14
Payer: COMMERCIAL

## 2025-05-14 NOTE — TELEPHONE ENCOUNTER
----- Message from Migdaliamalindain sent at 5/14/2025  9:32 AM CDT -----  Type : Patient Call  Who Called : Patient   Does the patient know what this is regarding?: Pt is requesting a call back in regards to rescheduling his appt and medication concerns . A call back from Cookie. Please Advise   Would the patient rather a call back or a response via [facility]? Call   Best Call Back Number:[phone]   Additional Information:

## 2025-05-14 NOTE — TELEPHONE ENCOUNTER
Patient is schedule to see you 06/12- your next available will be 07/01- patient said he will not have enough medication until July- please advised if you want to overbooked patient.

## 2025-06-02 ENCOUNTER — PATIENT MESSAGE (OUTPATIENT)
Dept: FAMILY MEDICINE | Facility: CLINIC | Age: 60
End: 2025-06-02
Payer: COMMERCIAL

## 2025-06-11 ENCOUNTER — OFFICE VISIT (OUTPATIENT)
Dept: FAMILY MEDICINE | Facility: CLINIC | Age: 60
End: 2025-06-11
Payer: COMMERCIAL

## 2025-06-11 ENCOUNTER — PATIENT MESSAGE (OUTPATIENT)
Dept: FAMILY MEDICINE | Facility: CLINIC | Age: 60
End: 2025-06-11

## 2025-06-11 VITALS
SYSTOLIC BLOOD PRESSURE: 126 MMHG | HEIGHT: 72 IN | DIASTOLIC BLOOD PRESSURE: 80 MMHG | WEIGHT: 223.44 LBS | TEMPERATURE: 98 F | HEART RATE: 77 BPM | OXYGEN SATURATION: 98 % | BODY MASS INDEX: 30.26 KG/M2

## 2025-06-11 DIAGNOSIS — E66.811 CLASS 1 OBESITY DUE TO EXCESS CALORIES WITH SERIOUS COMORBIDITY AND BODY MASS INDEX (BMI) OF 30.0 TO 30.9 IN ADULT: Primary | ICD-10-CM

## 2025-06-11 DIAGNOSIS — E66.09 CLASS 1 OBESITY DUE TO EXCESS CALORIES WITH SERIOUS COMORBIDITY AND BODY MASS INDEX (BMI) OF 30.0 TO 30.9 IN ADULT: Primary | ICD-10-CM

## 2025-06-11 DIAGNOSIS — E06.3 HYPOTHYROIDISM DUE TO HASHIMOTO THYROIDITIS: ICD-10-CM

## 2025-06-11 DIAGNOSIS — Z98.890 S/P ABLATION OF ATRIAL FLUTTER: ICD-10-CM

## 2025-06-11 DIAGNOSIS — I48.0 PAROXYSMAL ATRIAL FIBRILLATION WITH RVR: ICD-10-CM

## 2025-06-11 DIAGNOSIS — Z86.79 S/P ABLATION OF ATRIAL FLUTTER: ICD-10-CM

## 2025-06-11 DIAGNOSIS — E78.2 MIXED HYPERLIPIDEMIA: ICD-10-CM

## 2025-06-11 DIAGNOSIS — Z87.898 HISTORY OF PREDIABETES: ICD-10-CM

## 2025-06-11 PROCEDURE — 99999 PR PBB SHADOW E&M-EST. PATIENT-LVL IV: CPT | Mod: PBBFAC,,, | Performed by: NURSE PRACTITIONER

## 2025-06-11 RX ORDER — TIRZEPATIDE 10 MG/.5ML
10 INJECTION, SOLUTION SUBCUTANEOUS
Qty: 2 ML | Refills: 1 | Status: SHIPPED | OUTPATIENT
Start: 2025-06-11

## 2025-06-11 RX ORDER — CETIRIZINE HYDROCHLORIDE 10 MG/1
10 TABLET ORAL
COMMUNITY
Start: 2025-05-19

## 2025-06-11 NOTE — PROGRESS NOTES
Subjective:       Patient ID: Walt Lopez is a 59 y.o. male.    Chief Complaint: Follow-up (Review of all chronic problems with updated lab results)        HPI WITH ASSESSMENT AND PLAN OF CARE:      Patient is a 59 year old white male with paroxysmal AFIB S/P Cardiac Ablation, Hypothyroidism and Prediabetes followed by Endocrinology Dr. Alexander, Mixed Hyperlipidemia, and Obesity that is here today for follow up on all chronic health problems with review of outside labs results and to review medication for Obesity        Obesity  Working on lifestyle modifications  Denies personal and family history on pancreatitis or thyroid cancer.   STARTING WEIGHT 258 pounds; BMI 35 - started 9/5/24 9/5/2024: Started Zepbound 2.5  mg weekly x 4 weeks    10/1/2024: Increase to 5 mg injection weekly   23 lb weight loss Sept-December 2024.  Gained 5 pounds from 12/26/2024 to 1/27/2025 1/27/25: INCREASED Zepbound to 7.5 mg injection weekly   Lost 7 pounds since 2/11/25 for total loss of 25 pounds since start of medication  3/20/25: INCREASE Zepbound to 10 mg injection weekly   6/11/2025: Lost 10 pounds in past 3 months: 35 pounds since start of medication;  Stay on same dose 10 mg injection weekly  Recheck in 10 to 11 weeks.          Paroxysmal AFIB  S/P cardiac ablation in past  Followed by [facility] and [facility] NP Black  Episode 5/29/2024 requiring hospitalization  Transferred to [facility] and followed by Electrophysiologist Dr. Chaudhari  F/U on 6/17/24 by [facility] Electrophysiologist Dr. Chaudhari progress notes:  ASSESSMENT  Plan  - Testing results reviewed patient  Stress test with no perfusion abnormalities appreciated.  - Prior coronary calcium scoring 6.  - PAF, will initiate pill in pocket strategy. Flecainide 300mg to be taken as needed for atrial fibrillation recurrence. Advised patient to present to ER for evaluation prior to taking.  - Patient can also utilize diltiazem for aid in heart rate  control should events recur.  - Will repeat coronary calcium scoring, patient request performing an [facility] hospital.          12/17/2024 [facility] progress note:  Repeat EKG 12/17/2024:  Normal Sinus Rhythm.  Stable  Follow up in 1 year.          Mixed Hyperlipidemia  Currently taking Rosuvastatin 20 mg daily.  Current LDL 66 on  outside labs - see results below  Total 125, trigs 89, HDL 42 and LDL 55 on 4/10/2025 labs  Stay on same medication - recheck in 1 year         History of Prediabetes  HgbA1C 5.7% in May 2023  Currently 5.2% 11/8/24 labs  Followed by Endocrinology Dr. Alexander          Hypothyroidism due to Hashimoto's  Followed by Endocrinology Dr. Alexander  Last appt with Endocrinology 4/21/2025:  Notes: stable  some palpitations  1. decrease Synthroid 200 mcg daily - DAW, M- Sat and none on Sunday  2. discussed how to take  3. monitor free t4       Vitals:    06/11/25 1334   BP: 126/80   Pulse: 77   Temp: 97.9 °F (36.6 °C)   TempSrc: Temporal   SpO2: 98%   Weight: 101.4 kg (223 lb 7 oz)   Height: 6' (1.829 m)         Diagnoses this Encounter:         ICD-10-CM ICD-9-CM   1. Class 1 obesity due to excess calories with serious comorbidity and body mass index (BMI) of 30.0 to 30.9 in adult  E66.811 278.00    E66.09 V85.30    Z68.30    2. Paroxysmal atrial fibrillation with RVR  I48.0 427.31   3. S/P ablation of atrial flutter  Z98.890 V45.89    Z86.79    4. Mixed hyperlipidemia  E78.2 272.2   5. History of prediabetes  Z87.898 V12.29   6. Hypothyroidism due to Hashimoto thyroiditis  E06.3 245.2       Orders Placed This Encounter    tirzepatide, weight loss, (ZEPBOUND) 10 mg/0.5 mL PnIj        Follow up in about 10 weeks (around 8/20/2025) for follow up on obesity medication.     Patient's Medications   New Prescriptions    No medications on file   Previous Medications    ASPIRIN (ECOTRIN) 81 MG EC TABLET    Take 81 mg by mouth once daily.    AZELASTINE (ASTELIN) 137 MCG (0.1 %) NASAL  SPRAY    1 spray (137 mcg total) by Nasal route 2 (two) times daily.    CETIRIZINE (ZYRTEC) 10 MG TABLET    Take 10 mg by mouth.    FLECAINIDE (TAMBOCOR) 150 MG TAB    Take 150 mg by mouth as needed.    FLUTICASONE PROPIONATE (FLONASE) 50 MCG/ACTUATION NASAL SPRAY    2 sprays (100 mcg total) by Each Nostril route once daily.    LEVOCETIRIZINE (XYZAL) 5 MG TABLET    Take 1 tablet (5 mg total) by mouth every evening.    MULTIVIT-MIN/FA/LYCOPEN/LUTEIN (CENTRUM SILVER MEN ORAL)    Take 1 tablet by mouth once daily.    OMEGA-3 FATTY ACIDS-FISH -1,200 MG CPDR    Take 1,000 mg by mouth.    ROSUVASTATIN (CRESTOR) 20 MG TABLET    Take 20 mg by mouth every evening.    SYNTHROID 200 MCG TABLET    Taking 1 tablet daily every day except for Sundays.   Modified Medications    Modified Medication Previous Medication    TIRZEPATIDE, WEIGHT LOSS, (ZEPBOUND) 10 MG/0.5 ML PNIJ tirzepatide, weight loss, (ZEPBOUND) 10 mg/0.5 mL PnIj       Inject 10 mg (one pen) into the skin every 7 days.    Inject 10 mg (one pen) into the skin every 7 days.   Discontinued Medications    No medications on file         Review of Systems      Objective:        Physical Exam        Past Medical History:   Diagnosis Date    Atrial fibrillation     History of prior ablation treatment     Hypothyroidism     S/P ablation of atrial fibrillation        Past Surgical History:   Procedure Laterality Date    CARDIAC ELECTROPHYSIOLOGY STUDY AND ABLATION      COLONOSCOPY  12/22/2017    internal hemorrhoids, otherwise normal = repeat in 10 years - Dr. Cally Fuchs with [facility]    ESOPHAGOGASTRODUODENOSCOPY N/A 10/28/2021    Procedure: EGD (ESOPHAGOGASTRODUODENOSCOPY);  Surgeon: Patricia Soni MD;  Location: [facility] ENDO;  Service: Endoscopy;  Laterality: N/A;    KNEE SURGERY Right     NASAL SEPTOPLASTY N/A 07/29/2021    Procedure: SEPTOPLASTY, NOSE;  Surgeon: Josué Villafuerte MD;  Location: [facility] OR [address] [place] [address];  Service: ENT;  Laterality: N/A;    NASAL  TURBINATE REDUCTION Bilateral 07/29/2021    Procedure: REDUCTION, NASAL TURBINATE;  Surgeon: Josué Villafuerte MD;  Location: [facility] OR [address] [place] [address];  Service: ENT;  Laterality: Bilateral;    SHOULDER ARTHROSCOPY Right        Family History   Problem Relation Name Age of Onset    Hypertension Mother      Atrial fibrillation Mother      Cancer Father          esophageal cancer    No Known Problems Brother         Social History     Socioeconomic History    Marital status:    Occupational History    Occupation:    Tobacco Use    Smoking status: Never     Passive exposure: Never    Smokeless tobacco: Never   Substance and Sexual Activity    Alcohol use: Yes     Comment: once a month or less    Drug use: Never    Sexual activity: Yes     Partners: Female     Social Drivers of Health     Financial Resource Strain: Low Risk  (3/13/2025)    Overall Financial Resource Strain (CARDIA)     Difficulty of Paying Living Expenses: Not hard at all   Food Insecurity: No Food Insecurity (3/13/2025)    Hunger Vital Sign     Worried About Running Out of Food in the Last Year: Never true     Ran Out of Food in the Last Year: Never true   Transportation Needs: No Transportation Needs (3/13/2025)    PRAPARE - Transportation     Lack of Transportation (Medical): No     Lack of Transportation (Non-Medical): No   Physical Activity: Sufficiently Active (3/13/2025)    Exercise Vital Sign     Days of Exercise per Week: 4 days     Minutes of Exercise per Session: 40 min   Stress: No Stress Concern Present (3/13/2025)    [employer] - Occupational Stress Questionnaire     Feeling of Stress : Not at all   Housing Stability: Low Risk  (3/13/2025)    Housing Stability Vital Sign     Unable to Pay for Housing in the Last Year: No     Number of Times Moved in the Last Year: 0     Homeless in the Last Year: No

## 2025-06-13 ENCOUNTER — PATIENT OUTREACH (OUTPATIENT)
Dept: ADMINISTRATIVE | Facility: HOSPITAL | Age: 60
End: 2025-06-13
Payer: COMMERCIAL

## 2025-06-14 ENCOUNTER — OFFICE VISIT (OUTPATIENT)
Dept: URGENT CARE | Facility: CLINIC | Age: 60
End: 2025-06-14
Payer: COMMERCIAL

## 2025-06-14 VITALS
HEART RATE: 75 BPM | BODY MASS INDEX: 30.28 KG/M2 | HEIGHT: 72 IN | OXYGEN SATURATION: 98 % | SYSTOLIC BLOOD PRESSURE: 137 MMHG | RESPIRATION RATE: 17 BRPM | DIASTOLIC BLOOD PRESSURE: 78 MMHG | WEIGHT: 223.56 LBS | TEMPERATURE: 98 F

## 2025-06-14 DIAGNOSIS — B96.89 ACUTE BACTERIAL SINUSITIS: Primary | ICD-10-CM

## 2025-06-14 DIAGNOSIS — J01.90 ACUTE BACTERIAL SINUSITIS: Primary | ICD-10-CM

## 2025-06-14 PROCEDURE — 99214 OFFICE O/P EST MOD 30 MIN: CPT | Mod: S$GLB,,,

## 2025-06-14 RX ORDER — PROMETHAZINE HYDROCHLORIDE AND DEXTROMETHORPHAN HYDROBROMIDE 6.25; 15 MG/5ML; MG/5ML
5 SYRUP ORAL EVERY 4 HOURS PRN
Qty: 180 ML | Refills: 0 | Status: SHIPPED | OUTPATIENT
Start: 2025-06-14 | End: 2025-06-21

## 2025-06-14 RX ORDER — AMOXICILLIN AND CLAVULANATE POTASSIUM 875; 125 MG/1; MG/1
1 TABLET, FILM COATED ORAL EVERY 12 HOURS
Qty: 14 TABLET | Refills: 0 | Status: SHIPPED | OUTPATIENT
Start: 2025-06-14 | End: 2025-06-21

## 2025-06-14 NOTE — PATIENT INSTRUCTIONS
- You have been given an antibiotic to treat your condition today.    - Make sure to take antibiotic with food.   - Please complete the antibiotic as directed on the bottle.   - you can take over-the-counter probiotics during and after antibiotic use to help preserve gut luna and reduce gastrointestinal symptoms. Make sure to take probiotic 1-2 hours before or after antibiotic to get greatest benefits.     - Rest.    - Drink plenty of fluids. Increasing your fluid intake will help loosen up mucous.     CONGESTION:  - Plain Mucinex to help thin mucous. Drinking plenty of water can have the same effect.   - You can take over-the-counter claritin, zyrtec, allegra, OR xyzal as directed. These are antihistamines that can help with runny nose, nasal congestion, sneezing, and helps to dry up post-nasal drip, which usually causes sore throat and cough.  - Continue Astelin nasal spray    - You can use Flonase (fluticasone) nasal spray as directed for sinus congestion and postnasal drip. This is a steroid nasal spray that works locally over time to decrease the inflammation in your nose/sinuses and help with allergic symptoms. This is not an quick- relief spray like afrin, but it works well if used daily.  Discontinue if you develop nose bleed  - Use nasal saline prior to Flonase.  - Use Ocean Spray Nasal Saline 1-3 puffs each nostril every 2-3 hours then blow out onto tissue. This is to irrigate the nasal passage way to clear the sinus openings. Use until sinus problem resolved.  - You can also try NasalCrom nasal spray, which has been shown to help reduce duration of symptoms when started within 24 hours of symptom onset. Okay to use with Flonase and other allergy medications.   - A Neti Pot with sterile saline can help break up nasal congestion and give relief.     COUGH:  - You can take Delsym to help with cough.     SORE THROAT:   - Chloraseptic throat spray can help numb the throat.   - Warm salt water gargles can help  with sore throat.  - Warm tea with honey can help with sore throat and cough. Honey is a natural cough suppressant.     - Rest.    - Drink plenty of fluids.    - Acetaminophen (tylenol) or Ibuprofen (advil,motrin) as directed as needed for fever/pain. Avoid tylenol if you have a history of liver disease. Do not take ibuprofen if you have a history of GI bleeding, kidney disease, or if you take blood thinners.   - Ibuprofen dosing for adults: 400 mg by mouth every 4-6 hours as needed. Max: 2400 mg/day; Info: use lowest effective dose, shortest effective treatment duration; give w/ food if GI upset occurs.  - Tylenol dosing for adults: [By mouth route, immediate-release form] Dose: 325-1000 mg by mouth every 4-6h as needed; Max: 1 g/4h and 4 g/day from all sources. [By mouth route, extended-release form] Dose: 650-1300 mg Extended Release by mouth every 8h as needed; Max: 4 g/day from all sources.     - You must understand that you have received an Urgent Care treatment only and that you may be released before all of your medical problems are known or treated.   - You, the patient, will arrange for follow up care as instructed.   - If your condition worsens or fails to improve we recommend that you receive another evaluation at the ER immediately or contact your PCP to discuss your concerns or return here.   - Follow up with your PCP or specialty clinic as directed in the next 1-2 weeks if not improved or as needed.  You can call [phone] to schedule an appointment with the appropriate provider.    If your symptoms do not improve or worsen, go to the emergency room immediately.

## 2025-06-14 NOTE — PROGRESS NOTES
Subjective:      Patient ID: Walt Lopez is a 59 y.o. male.    Vitals:  height is 6' (1.829 m) and weight is 101.4 kg (223 lb 8.7 oz). His oral temperature is 98.3 °F (36.8 °C). His blood pressure is 137/78 and his pulse is 75. His respiration is 17 and oxygen saturation is 98%.     Chief Complaint: Cough (Sore throat - Entered by patient)    Pt states he had an onset of a non-productive cough 2 weeks ago.  Started while patient was on a cruise. C/o sore throat and nasal congestion.  Hears wheezing at night when he is laying down.  No wheezing during the day when he is up and moving around.  Denies any shortness of breath.  Self administered Advil and Tylenol-- no relief.    Cough  This is a new problem. The current episode started 1 to 4 weeks ago. The problem has been gradually worsening. The problem occurs constantly. The cough is Non-productive. Associated symptoms include nasal congestion and a sore throat. Pertinent negatives include no fever or headaches. Nothing aggravates the symptoms. Risk factors for lung disease include travel (cruise). Treatments tried: tylenol, advil. The treatment provided no relief.     Constitution: Negative for fever.   HENT:  Positive for congestion, sinus pressure and sore throat.    Respiratory:  Positive for cough. Negative for sputum production.    Neurological:  Negative for headaches.      Objective:     Physical Exam   Constitutional: He is oriented to person, place, and time. He appears well-developed. He is cooperative.  Non-toxic appearance. He does not appear ill. No distress.      Comments:Patient sits comfortably in exam chair. Answers questions in complete sentences. Does not show any signs of distress or discoloration.        HENT:   Head: Normocephalic and atraumatic.   Ears:   Right Ear: Hearing, tympanic membrane, external ear and ear canal normal. no impacted cerumen  Left Ear: Hearing, tympanic membrane, external ear and ear canal normal. no impacted  cerumen  Nose: Congestion present. No mucosal edema, rhinorrhea or nasal deformity. No epistaxis. Right sinus exhibits no maxillary sinus tenderness and no frontal sinus tenderness. Left sinus exhibits no maxillary sinus tenderness and no frontal sinus tenderness.   Mouth/Throat: Uvula is midline and mucous membranes are normal. No trismus in the jaw. Normal dentition. No uvula swelling. Posterior oropharyngeal erythema present. No oropharyngeal exudate or posterior oropharyngeal edema.   Eyes: Conjunctivae and lids are normal. No scleral icterus.   Neck: Trachea normal and phonation normal. Neck supple. No edema present. No erythema present. No neck rigidity present.   Cardiovascular: Normal rate, regular rhythm, normal heart sounds and normal pulses.   Pulmonary/Chest: Effort normal and breath sounds normal. No stridor. No respiratory distress. He has no decreased breath sounds. He has no wheezes. He has no rhonchi. He has no rales. He exhibits no tenderness.   Abdominal: Normal appearance.   Musculoskeletal: Normal range of motion.         General: No deformity. Normal range of motion.   Neurological: He is alert and oriented to person, place, and time. He exhibits normal muscle tone. Coordination normal.   Skin: Skin is warm, dry, intact, not diaphoretic and not pale.   Psychiatric: His speech is normal and behavior is normal. Judgment and thought content normal.   Nursing note and vitals reviewed.      Assessment:     1. Acute bacterial sinusitis        Plan:       Acute bacterial sinusitis  -     amoxicillin-clavulanate 875-125mg (AUGMENTIN) 875-125 mg per tablet; Take 1 tablet by mouth every 12 (twelve) hours. for 7 days  Dispense: 14 tablet; Refill: 0  -     promethazine-dextromethorphan (PROMETHAZINE-DM) 6.25-15 mg/5 mL Syrp; Take 5 mLs by mouth every 4 (four) hours as needed (cough).  Dispense: 180 mL; Refill: 0                Patient Instructions   - You have been given an antibiotic to treat your  condition today.    - Make sure to take antibiotic with food.   - Please complete the antibiotic as directed on the bottle.   - you can take over-the-counter probiotics during and after antibiotic use to help preserve gut luna and reduce gastrointestinal symptoms. Make sure to take probiotic 1-2 hours before or after antibiotic to get greatest benefits.     - Rest.    - Drink plenty of fluids. Increasing your fluid intake will help loosen up mucous.     CONGESTION:  - Plain Mucinex to help thin mucous. Drinking plenty of water can have the same effect.   - You can take over-the-counter claritin, zyrtec, allegra, OR xyzal as directed. These are antihistamines that can help with runny nose, nasal congestion, sneezing, and helps to dry up post-nasal drip, which usually causes sore throat and cough.   - You can use Flonase (fluticasone) nasal spray as directed for sinus congestion and postnasal drip. This is a steroid nasal spray that works locally over time to decrease the inflammation in your nose/sinuses and help with allergic symptoms. This is not an quick- relief spray like afrin, but it works well if used daily.  Discontinue if you develop nose bleed  - Use nasal saline prior to Flonase.  - Use Ocean Spray Nasal Saline 1-3 puffs each nostril every 2-3 hours then blow out onto tissue. This is to irrigate the nasal passage way to clear the sinus openings. Use until sinus problem resolved.  - You can also try NasalCrom nasal spray, which has been shown to help reduce duration of symptoms when started within 24 hours of symptom onset. Okay to use with Flonase and other allergy medications.   - A Neti Pot with sterile saline can help break up nasal congestion and give relief.     COUGH:  - You can take Delsym to help with cough.     SORE THROAT:   - Chloraseptic throat spray can help numb the throat.   - Warm salt water gargles can help with sore throat.  - Warm tea with honey can help with sore throat and cough.  Honey is a natural cough suppressant.     - Rest.    - Drink plenty of fluids.    - Acetaminophen (tylenol) or Ibuprofen (advil,motrin) as directed as needed for fever/pain. Avoid tylenol if you have a history of liver disease. Do not take ibuprofen if you have a history of GI bleeding, kidney disease, or if you take blood thinners.   - Ibuprofen dosing for adults: 400 mg by mouth every 4-6 hours as needed. Max: 2400 mg/day; Info: use lowest effective dose, shortest effective treatment duration; give w/ food if GI upset occurs.  - Tylenol dosing for adults: [By mouth route, immediate-release form] Dose: 325-1000 mg by mouth every 4-6h as needed; Max: 1 g/4h and 4 g/day from all sources. [By mouth route, extended-release form] Dose: 650-1300 mg Extended Release by mouth every 8h as needed; Max: 4 g/day from all sources.     - You must understand that you have received an Urgent Care treatment only and that you may be released before all of your medical problems are known or treated.   - You, the patient, will arrange for follow up care as instructed.   - If your condition worsens or fails to improve we recommend that you receive another evaluation at the ER immediately or contact your PCP to discuss your concerns or return here.   - Follow up with your PCP or specialty clinic as directed in the next 1-2 weeks if not improved or as needed.  You can call [phone] to schedule an appointment with the appropriate provider.    If your symptoms do not improve or worsen, go to the emergency room immediately.

## 2025-07-07 ENCOUNTER — HOSPITAL ENCOUNTER (INPATIENT)
Facility: HOSPITAL | Age: 60
LOS: 3 days | Discharge: HOME | DRG: 274 | End: 2025-07-10
Attending: STUDENT IN AN ORGANIZED HEALTH CARE EDUCATION/TRAINING PROGRAM | Admitting: INTERNAL MEDICINE
Payer: COMMERCIAL

## 2025-07-07 ENCOUNTER — PATIENT MESSAGE (OUTPATIENT)
Dept: CARDIOLOGY | Facility: CLINIC | Age: 60
End: 2025-07-07
Payer: COMMERCIAL

## 2025-07-07 DIAGNOSIS — I48.4 ATYPICAL ATRIAL FLUTTER: ICD-10-CM

## 2025-07-07 DIAGNOSIS — Z86.79 S/P ABLATION OF ATRIAL FLUTTER: Primary | ICD-10-CM

## 2025-07-07 DIAGNOSIS — Z98.890 S/P ABLATION OF ATRIAL FLUTTER: Primary | ICD-10-CM

## 2025-07-07 DIAGNOSIS — I48.92 ATRIAL FLUTTER: ICD-10-CM

## 2025-07-07 DIAGNOSIS — R00.0 TACHYCARDIA: ICD-10-CM

## 2025-07-07 LAB
ANION GAP (OHS): 10 MMOL/L (ref 8–16)
AORTIC SIZE INDEX (SOV): 1.5 CM/M2
AORTIC SIZE INDEX: 1.3 CM/M2
APTT PPP: 26.7 SECONDS (ref 21–32)
APTT PPP: 28.2 SECONDS (ref 21–32)
APTT PPP: 68.3 SECONDS (ref 21–32)
ASCENDING AORTA: 2.9 CM
AV AREA BY CONTINUOUS VTI: 3.9 CM2
AV INDEX (PROSTH): 0.93
AV LVOT MEAN GRADIENT: 2 MMHG
AV LVOT PEAK GRADIENT: 4 MMHG
AV MEAN GRADIENT: 2 MMHG
AV PEAK GRADIENT: 5 MMHG
AV VALVE AREA BY VELOCITY RATIO: 3.8 CM²
AV VALVE AREA: 3.9 CM2
AV VELOCITY RATIO: 0.91
BSA FOR ECHO PROCEDURE: 2.26 M2
BUN SERPL-MCNC: 15 MG/DL (ref 6–20)
CALCIUM SERPL-MCNC: 8.9 MG/DL (ref 8.7–10.5)
CHLORIDE SERPL-SCNC: 110 MMOL/L (ref 95–110)
CO2 SERPL-SCNC: 21 MMOL/L (ref 23–29)
CREAT SERPL-MCNC: 0.9 MG/DL (ref 0.5–1.4)
CV ECHO LV RWT: 0.35 CM
DOP CALC AO PEAK VEL: 1.1 M/S
DOP CALC AO VTI: 15.8 CM
DOP CALC LVOT AREA: 4.2 CM2
DOP CALC LVOT DIAMETER: 2.3 CM
DOP CALC LVOT PEAK VEL: 1 M/S
DOP CALC LVOT STROKE VOLUME: 61 CM3
DOP CALCLVOT PEAK VEL VTI: 14.7 CM
E/E' RATIO: 8 M/S
ECHO EF ESTIMATED: 64 %
ECHO LV POSTERIOR WALL: 0.9 CM (ref 0.6–1.1)
EJECTION FRACTION: 60 %
FRACTIONAL SHORTENING: 35.3 % (ref 28–44)
GFR SERPLBLD CREATININE-BSD FMLA CKD-EPI: >60 ML/MIN/1.73/M2
GLUCOSE SERPL-MCNC: 122 MG/DL (ref 70–110)
HCV AB SERPL QL IA: NORMAL
HIV 1+2 AB+HIV1 P24 AG SERPL QL IA: NORMAL
INTERVENTRICULAR SEPTUM: 1.1 CM (ref 0.6–1.1)
LA MAJOR: 5.3 CM
LA MINOR: 5.4 CM
LA WIDTH: 3.1 CM
LEFT ATRIUM SIZE: 3.6 CM
LEFT ATRIUM VOLUME INDEX MOD: 26 ML/M2
LEFT ATRIUM VOLUME INDEX: 23 ML/M2
LEFT ATRIUM VOLUME MOD: 58 ML
LEFT ATRIUM VOLUME: 51 CM3
LEFT INTERNAL DIMENSION IN SYSTOLE: 3.3 CM (ref 2.1–4)
LEFT VENTRICLE DIASTOLIC VOLUME INDEX: 54.26 ML/M2
LEFT VENTRICLE DIASTOLIC VOLUME: 121 ML
LEFT VENTRICLE MASS INDEX: 84.3 G/M2
LEFT VENTRICLE SYSTOLIC VOLUME INDEX: 19.3 ML/M2
LEFT VENTRICLE SYSTOLIC VOLUME: 43 ML
LEFT VENTRICULAR INTERNAL DIMENSION IN DIASTOLE: 5.1 CM (ref 3.5–6)
LEFT VENTRICULAR MASS: 188 G
LV LATERAL E/E' RATIO: 7.2 M/S
LV SEPTAL E/E' RATIO: 8.5 M/S
MV PEAK E VEL: 0.93 M/S
OHS CV RV/LV RATIO: 0.69 CM
OHS QRS DURATION: 106 MS
OHS QRS DURATION: 108 MS
OHS QRS DURATION: 108 MS
OHS QRS DURATION: 110 MS
OHS QRS DURATION: 110 MS
OHS QRS DURATION: 112 MS
OHS QRS DURATION: 116 MS
OHS QTC CALCULATION: 422 MS
OHS QTC CALCULATION: 427 MS
OHS QTC CALCULATION: 439 MS
OHS QTC CALCULATION: 449 MS
OHS QTC CALCULATION: 473 MS
OHS QTC CALCULATION: 489 MS
OHS QTC CALCULATION: 538 MS
POTASSIUM SERPL-SCNC: 3.7 MMOL/L (ref 3.5–5.1)
RA MAJOR: 5.39 CM
RA PRESSURE ESTIMATED: 8 MMHG
RA WIDTH: 2.74 CM
RIGHT VENTRICLE DIASTOLIC BASEL DIMENSION: 3.5 CM
SINUS: 3.3 CM
SODIUM SERPL-SCNC: 141 MMOL/L (ref 136–145)
STJ: 2.7 CM
TDI LATERAL: 0.13 M/S
TDI SEPTAL: 0.11 M/S
TDI: 0.12 M/S
TRICUSPID ANNULAR PLANE SYSTOLIC EXCURSION: 1.2 CM
TSH SERPL-ACNC: 1.27 UIU/ML (ref 0.4–4)
Z-SCORE OF LEFT VENTRICULAR DIMENSION IN END DIASTOLE: -4.35
Z-SCORE OF LEFT VENTRICULAR DIMENSION IN END SYSTOLE: -2.93

## 2025-07-07 PROCEDURE — 87040 BLOOD CULTURE FOR BACTERIA: CPT

## 2025-07-07 PROCEDURE — 99232 SBSQ HOSP IP/OBS MODERATE 35: CPT | Mod: ,,, | Performed by: INTERNAL MEDICINE

## 2025-07-07 PROCEDURE — 99285 EMERGENCY DEPT VISIT HI MDM: CPT | Mod: 25

## 2025-07-07 PROCEDURE — 25000003 PHARM REV CODE 250: Performed by: STUDENT IN AN ORGANIZED HEALTH CARE EDUCATION/TRAINING PROGRAM

## 2025-07-07 PROCEDURE — 93010 ELECTROCARDIOGRAM REPORT: CPT | Mod: ,,, | Performed by: INTERNAL MEDICINE

## 2025-07-07 PROCEDURE — 84443 ASSAY THYROID STIM HORMONE: CPT | Performed by: STUDENT IN AN ORGANIZED HEALTH CARE EDUCATION/TRAINING PROGRAM

## 2025-07-07 PROCEDURE — 80048 BASIC METABOLIC PNL TOTAL CA: CPT | Performed by: INTERNAL MEDICINE

## 2025-07-07 PROCEDURE — 85730 THROMBOPLASTIN TIME PARTIAL: CPT | Performed by: INTERNAL MEDICINE

## 2025-07-07 PROCEDURE — 93005 ELECTROCARDIOGRAM TRACING: CPT

## 2025-07-07 PROCEDURE — 25000003 PHARM REV CODE 250: Performed by: INTERNAL MEDICINE

## 2025-07-07 PROCEDURE — 86803 HEPATITIS C AB TEST: CPT | Performed by: PHYSICIAN ASSISTANT

## 2025-07-07 PROCEDURE — 20000000 HC ICU ROOM

## 2025-07-07 PROCEDURE — 63600175 PHARM REV CODE 636 W HCPCS

## 2025-07-07 PROCEDURE — 87389 HIV-1 AG W/HIV-1&-2 AB AG IA: CPT | Performed by: PHYSICIAN ASSISTANT

## 2025-07-07 PROCEDURE — 63600175 PHARM REV CODE 636 W HCPCS: Performed by: STUDENT IN AN ORGANIZED HEALTH CARE EDUCATION/TRAINING PROGRAM

## 2025-07-07 PROCEDURE — 96374 THER/PROPH/DIAG INJ IV PUSH: CPT

## 2025-07-07 PROCEDURE — 99223 1ST HOSP IP/OBS HIGH 75: CPT | Mod: AI,,, | Performed by: INTERNAL MEDICINE

## 2025-07-07 PROCEDURE — 96376 TX/PRO/DX INJ SAME DRUG ADON: CPT

## 2025-07-07 PROCEDURE — 94761 N-INVAS EAR/PLS OXIMETRY MLT: CPT

## 2025-07-07 PROCEDURE — 63600175 PHARM REV CODE 636 W HCPCS: Performed by: INTERNAL MEDICINE

## 2025-07-07 PROCEDURE — 25000003 PHARM REV CODE 250

## 2025-07-07 RX ORDER — ATORVASTATIN CALCIUM 40 MG/1
80 TABLET, FILM COATED ORAL DAILY
Status: DISCONTINUED | OUTPATIENT
Start: 2025-07-07 | End: 2025-07-10 | Stop reason: HOSPADM

## 2025-07-07 RX ORDER — DILTIAZEM HYDROCHLORIDE 5 MG/ML
30 INJECTION INTRAVENOUS
Status: COMPLETED | OUTPATIENT
Start: 2025-07-07 | End: 2025-07-07

## 2025-07-07 RX ORDER — DILTIAZEM HYDROCHLORIDE 5 MG/ML
0.25 INJECTION INTRAVENOUS ONCE
Status: CANCELLED | OUTPATIENT
Start: 2025-07-07 | End: 2025-07-07

## 2025-07-07 RX ORDER — MUPIROCIN 20 MG/G
OINTMENT TOPICAL 2 TIMES DAILY
Status: DISCONTINUED | OUTPATIENT
Start: 2025-07-07 | End: 2025-07-10 | Stop reason: HOSPADM

## 2025-07-07 RX ORDER — DILTIAZEM HYDROCHLORIDE 5 MG/ML
25 INJECTION INTRAVENOUS
Status: COMPLETED | OUTPATIENT
Start: 2025-07-07 | End: 2025-07-07

## 2025-07-07 RX ORDER — DILTIAZEM HYDROCHLORIDE 5 MG/ML
INJECTION INTRAVENOUS
Status: COMPLETED
Start: 2025-07-07 | End: 2025-07-07

## 2025-07-07 RX ORDER — SODIUM CHLORIDE 0.9 % (FLUSH) 0.9 %
10 SYRINGE (ML) INJECTION
Status: DISCONTINUED | OUTPATIENT
Start: 2025-07-07 | End: 2025-07-10 | Stop reason: HOSPADM

## 2025-07-07 RX ORDER — DIGOXIN 0.25 MG/ML
500 INJECTION INTRAMUSCULAR; INTRAVENOUS ONCE
Status: COMPLETED | OUTPATIENT
Start: 2025-07-07 | End: 2025-07-07

## 2025-07-07 RX ORDER — POTASSIUM CHLORIDE 750 MG/1
50 CAPSULE, EXTENDED RELEASE ORAL ONCE
Status: COMPLETED | OUTPATIENT
Start: 2025-07-07 | End: 2025-07-07

## 2025-07-07 RX ORDER — POTASSIUM CHLORIDE 750 MG/1
30 CAPSULE, EXTENDED RELEASE ORAL ONCE
Status: COMPLETED | OUTPATIENT
Start: 2025-07-07 | End: 2025-07-07

## 2025-07-07 RX ORDER — LEVOTHYROXINE SODIUM 100 UG/1
100 TABLET ORAL
Status: DISCONTINUED | OUTPATIENT
Start: 2025-07-08 | End: 2025-07-10 | Stop reason: HOSPADM

## 2025-07-07 RX ORDER — DIGOXIN 0.25 MG/ML
250 INJECTION INTRAMUSCULAR; INTRAVENOUS EVERY 6 HOURS
Status: DISCONTINUED | OUTPATIENT
Start: 2025-07-07 | End: 2025-07-08

## 2025-07-07 RX ORDER — HEPARIN SODIUM,PORCINE/D5W 25000/250
0-40 INTRAVENOUS SOLUTION INTRAVENOUS CONTINUOUS
Status: DISCONTINUED | OUTPATIENT
Start: 2025-07-07 | End: 2025-07-09

## 2025-07-07 RX ADMIN — DILTIAZEM HYDROCHLORIDE 30 MG: 5 INJECTION INTRAVENOUS at 02:07

## 2025-07-07 RX ADMIN — DILTIAZEM HYDROCHLORIDE 30 MG: 5 INJECTION, SOLUTION INTRAVENOUS at 02:07

## 2025-07-07 RX ADMIN — HEPARIN SODIUM 12 UNITS/KG/HR: 10000 INJECTION, SOLUTION INTRAVENOUS at 11:07

## 2025-07-07 RX ADMIN — DILTIAZEM HYDROCHLORIDE 15 MG/HR: 5 INJECTION INTRAVENOUS at 11:07

## 2025-07-07 RX ADMIN — DILTIAZEM HYDROCHLORIDE 15 MG/HR: 5 INJECTION INTRAVENOUS at 01:07

## 2025-07-07 RX ADMIN — DILTIAZEM HYDROCHLORIDE 15 MG/HR: 5 INJECTION INTRAVENOUS at 06:07

## 2025-07-07 RX ADMIN — POTASSIUM CHLORIDE 30 MEQ: 750 CAPSULE, EXTENDED RELEASE ORAL at 08:07

## 2025-07-07 RX ADMIN — ATORVASTATIN CALCIUM 80 MG: 40 TABLET, FILM COATED ORAL at 08:07

## 2025-07-07 RX ADMIN — DILTIAZEM HYDROCHLORIDE 15 MG/HR: 5 INJECTION INTRAVENOUS at 02:07

## 2025-07-07 RX ADMIN — MUPIROCIN: 20 OINTMENT TOPICAL at 08:07

## 2025-07-07 RX ADMIN — DIGOXIN 250 MCG: 250 INJECTION, SOLUTION INTRAMUSCULAR; INTRAVENOUS at 11:07

## 2025-07-07 RX ADMIN — DILTIAZEM HYDROCHLORIDE 25 MG: 5 INJECTION INTRAVENOUS at 01:07

## 2025-07-07 RX ADMIN — POTASSIUM CHLORIDE 50 MEQ: 750 CAPSULE, EXTENDED RELEASE ORAL at 08:07

## 2025-07-07 RX ADMIN — DIGOXIN 250 MCG: 250 INJECTION, SOLUTION INTRAMUSCULAR; INTRAVENOUS at 04:07

## 2025-07-07 RX ADMIN — APIXABAN 5 MG: 5 TABLET, FILM COATED ORAL at 08:07

## 2025-07-07 RX ADMIN — DIGOXIN 500 MCG: 250 INJECTION, SOLUTION INTRAMUSCULAR; INTRAVENOUS at 11:07

## 2025-07-07 NOTE — HPI
59 yro M with paroxysmal AFIB s/p redo-PVI x 3 and mitral annular flutter ablation by Dr. Chaudhari in March of 2021, Hypothyroidism, hld who presents today for tachycardia.   He follows up with  EP services with [facility] Dr. Almanza. He was last seen by Dr. Argueta in 2023.   Patient reports episodes of feeling extra heart beat, but denies lightheadedness or dizziness.   Patient denies CP, SOB, palpitations, orthopnea, PND, presyncope, LOC, swelling, or claudication. Patient monitors home BP and ranges 130s. Patient reports medication compliance without side effects  Patient does exercise regularly and cut his grass without limitations.   Ep history:  s/p redo-PVI x 3 and mitral annular flutter ablation by Dr. Chaudhari in March of 2021. At that ablation procedure his right veins required re-isolation. The patient presented in atrial flutter which per procedure report was consistent with mitral annular flutter. He underwent a lateral mitral isthmus ablation. He did well until he presented to the ER on 9/4/2022 with palpitations and was in AFL with [facility]. Plan was to give as needed diltiazem and if it occurs again would be him back to the lab.

## 2025-07-07 NOTE — ASSESSMENT & PLAN NOTE
s/p redo-PVI x 3 and mitral annular flutter ablation by Dr. Chaudhari in March of 2021    Back in atypical flutter   HR in 150's difficult to control in Ed. HDS otherwise.   Started on Dilt gtt at OSH, here in C Diltiazem bolus x2 given.     -- Continue Dilt gtt; rate controlled in ED   -- Transitioned from apixiban to hepain  -- [id number] Score: 1  -- TTE   -- EP seen the patient, plan for ablation 7/9

## 2025-07-07 NOTE — HOSPITAL COURSE
59 yro M with paroxysmal AFIB s/p redo-PVI x 3 and mitral annular flutter ablation by Dr. Chaudhari in March of 2021, Hypothyroidism, hld admitted to CCU for atrial aflutter now s/p aflutter ablation underwent PVI and posterior wall isolation w/ EP. He was discharge home w/ no antiarrhythmic. He is take Eliquis 5 mg bid for 30 days and to hold aspirin. Okay to resume aspirin after eliquis is completed. He is to follow up with Dr. Argueta outpatient.

## 2025-07-07 NOTE — SUBJECTIVE & OBJECTIVE
Past Medical History:   Diagnosis Date    Atrial fibrillation     History of prior ablation treatment     Hypothyroidism     S/P ablation of atrial fibrillation        Past Surgical History:   Procedure Laterality Date    CARDIAC ELECTROPHYSIOLOGY STUDY AND ABLATION      COLONOSCOPY  12/22/2017    internal hemorrhoids, otherwise normal = repeat in 10 years - Dr. Cally Fuchs with [facility]    ESOPHAGOGASTRODUODENOSCOPY N/A 10/28/2021    Procedure: EGD (ESOPHAGOGASTRODUODENOSCOPY);  Surgeon: Patricia Soni MD;  Location: [facility];  Service: Endoscopy;  Laterality: N/A;    KNEE SURGERY Right     NASAL SEPTOPLASTY N/A 07/29/2021    Procedure: SEPTOPLASTY, NOSE;  Surgeon: Josué Villafuerte MD;  [facility];  Service: ENT;  Laterality: N/A;    NASAL TURBINATE REDUCTION Bilateral 07/29/2021    Procedure: REDUCTION, NASAL TURBINATE;  Surgeon: Josué Villafuerte MD;  Location[facility];  Service: ENT;  Laterality: Bilateral;    SHOULDER ARTHROSCOPY Right        Review of patient's allergies indicates:  No Known Allergies    Current Facility-Administered Medications on File Prior to Encounter   Medication    [COMPLETED] diltiaZEM 125 mg in D5W 125 mL infusion    [COMPLETED] diltiaZEM injection 25.5 mg    [COMPLETED] diltiaZEM injection 35.5 mg    [COMPLETED] diltiaZEM tablet 30 mg    [COMPLETED] enoxaparin injection 100 mg    [COMPLETED] sodium chloride 0.9% bolus 1,000 mL 1,000 mL     Current Outpatient Medications on File Prior to Encounter   Medication Sig    apixaban (ELIQUIS) 5 mg Tab Take 1 tablet (5 mg total) by mouth 2 (two) times daily.    aspirin (ECOTRIN) 81 MG EC tablet Take 81 mg by mouth once daily.    azelastine (ASTELIN) 137 mcg (0.1 %) nasal spray 1 spray (137 mcg total) by Nasal route 2 (two) times daily.    cetirizine (ZYRTEC) 10 MG tablet Take 10 mg by mouth.    diltiaZEM (CARDIZEM) 30 MG tablet Take 1 tablet (30 mg total) by mouth every 12 (twelve) hours.    flecainide (TAMBOCOR)  150 MG Tab Take 150 mg by mouth as needed.    fluticasone propionate (FLONASE) 50 mcg/actuation nasal spray 2 sprays (100 mcg total) by Each Nostril route once daily.    levocetirizine (XYZAL) 5 MG tablet Take 1 tablet (5 mg total) by mouth every evening.    multivit-min/FA/lycopen/lutein (CENTRUM SILVER MEN ORAL) Take 1 tablet by mouth once daily.    omega-3 fatty acids-fish oil 684-1,200 mg CpDR Take 1,000 mg by mouth.    rosuvastatin (CRESTOR) 20 MG tablet Take 20 mg by mouth every evening.    SYNTHROID 200 mcg tablet Taking 1 tablet daily every day except for Sundays.    tirzepatide, weight loss, (ZEPBOUND) 10 mg/0.5 mL PnIj Inject 10 mg (one pen) into the skin every 7 days.     Family History       Problem Relation (Age of Onset)    Atrial fibrillation Mother    Cancer Father    Hypertension Mother    No Known Problems Brother          Tobacco Use    Smoking status: Never     Passive exposure: Never    Smokeless tobacco: Never   Substance and Sexual Activity    Alcohol use: Yes     Comment: once a month or less    Drug use: Never    Sexual activity: Yes     Partners: Female     Review of Systems   Constitutional: Negative for chills, decreased appetite, diaphoresis, fever, malaise/fatigue and weight gain.   HENT:  Negative for congestion and ear discharge.    Eyes:  Negative for blurred vision.   Cardiovascular:  Positive for palpitations. Negative for chest pain, dyspnea on exertion, irregular heartbeat, leg swelling, orthopnea and paroxysmal nocturnal dyspnea.   Respiratory:  Negative for cough, shortness of breath, snoring and sputum production.    Skin:  Negative for color change, dry skin and rash.   Musculoskeletal:  Negative for back pain, falls, joint pain and neck pain.   Gastrointestinal:  Negative for bloating, abdominal pain, constipation and diarrhea.   Genitourinary:  Negative for dysuria, flank pain, hematuria and hesitancy.   Neurological:  Negative for focal weakness, headaches, numbness and  seizures.   Psychiatric/Behavioral:  Negative for altered mental status, depression and hallucinations.      Objective:     Vital Signs (Most Recent):  Temp: 98.6 °F (37 °C) (07/07/25 0619)  Pulse: (!) 160 (07/07/25 0600)  Resp: 20 (07/07/25 0546)  BP: 127/84 (07/07/25 0600)  SpO2: 100 % (07/07/25 0600) Vital Signs (24h Range):  Temp:  [98.3 °F (36.8 °C)-98.6 °F (37 °C)] 98.6 °F (37 °C)  Pulse:  [] 160  Resp:  [13-35] 20  SpO2:  [94 %-100 %] 100 %  BP: (111-173)/(57-97) 127/84     Weight: 101.5 kg (223 lb 12.3 oz)  Body mass index is 30.35 kg/m².    SpO2: 100 %         Intake/Output Summary (Last 24 hours) at 7/7/2025 0628  Last data filed at 7/7/2025 0259  Gross per 24 hour   Intake --   Output 400 ml   Net -400 ml       Lines/Drains/Airways       Peripheral Intravenous Line  Duration             Peripheral IV Single Lumen 07/07/25 0125 18 G Anterior;Proximal;Right Forearm <1 day    Peripheral IV Single Lumen 07/07/25 0125 20 G Anterior;Left;Proximal Forearm <1 day                     Physical Exam  Constitutional:       General: He is not in acute distress.     Appearance: He is not ill-appearing.   HENT:      Nose: Nose normal.      Mouth/Throat:      Mouth: Mucous membranes are moist.   Eyes:      Pupils: Pupils are equal, round, and reactive to light.   Neck:      Comments: No JVD appreciated   Cardiovascular:      Rate and Rhythm: Regular rhythm. Tachycardia present.      Pulses: Normal pulses.      Heart sounds: No murmur heard.  Pulmonary:      Effort: Pulmonary effort is normal. No respiratory distress.      Breath sounds: No wheezing or rales.   Abdominal:      General: Abdomen is flat. There is no distension.      Palpations: Abdomen is soft.      Tenderness: There is no right CVA tenderness or left CVA tenderness.   Musculoskeletal:         General: No swelling.      Cervical back: Normal range of motion and neck supple. No tenderness.      Right lower leg: No edema.      Left lower leg: No edema.  "  Skin:     General: Skin is warm.      Coloration: Skin is not pale.      Findings: No rash.   Neurological:      General: No focal deficit present.      Mental Status: He is alert and oriented to person, place, and time.      Motor: No weakness.          Significant Labs: ABG: No results for input(s): "PH", "PCO2", "HCO3", "POCSATURATED", "BE" in the last 48 hours., Blood Culture: No results for input(s): "LABBLOO" in the last 48 hours., BMP:   Recent Labs   Lab 07/05/25 1824 07/06/25  2232   * 103    142   K 3.7 3.5    108   CO2 22* 25   BUN 22* 23*   CREATININE 0.9 1.1   CALCIUM 9.5 9.7   MG 2.0 2.1   , CMP   Recent Labs   Lab 07/05/25 1824 07/06/25  2232    142   K 3.7 3.5    108   CO2 22* 25   * 103   BUN 22* 23*   CREATININE 0.9 1.1   CALCIUM 9.5 9.7   PROT 7.8 8.4   ALBUMIN 4.1 4.3   BILITOT 0.5 0.6   ALKPHOS 63 73   AST 33 34   ALT 27 28   ANIONGAP 9 9   , CBC   Recent Labs   Lab 07/05/25 1824 07/06/25 2232   WBC 8.78 9.85   HGB 14.9 15.1   HCT 44.4 46.1    202   , INR No results for input(s): "INR", "PROTIME" in the last 48 hours., Lipid Panel No results for input(s): "CHOL", "HDL", "LDLCALC", "TRIG", "CHOLHDL" in the last 48 hours.,   Pathology Results  (Last 10 years)                 10/28/21 1014  Specimen to Pathology, Surgery Gastrointestinal tract Edited Result - FINAL    Narrative:  Pre-op Diagnosis: Globus sensation [R19.8]   Post-op Diagnosis: r/o h. Pylori, r/o eoe   Procedure(s):   EGD (ESOPHAGOGASTRODUODENOSCOPY)   Number of specimens: 2   Name of specimens: 1.) antrum bx       2.) proximal esophagus bx   Release to patient->Immediate   Specimen total (fresh, frozen, permanent):->2       07/29/21 1800  Specimen to Pathology, Surgery ENT Final result    Narrative:  Pre-op Diagnosis: Nasal cavity mass [J34.89]   Deviated nasal septum [J34.2]   Nasal turbinate hypertrophy [J34.3]   Sinus pressure [J34.89]   Acute sinusitis, recurrence not specified, " "unspecified   location [J01.90]   Procedure(s):   SEPTOPLASTY, NOSE   REDUCTION, NASAL TURBINATE   IRRIGATION AND DEBRIDEMENT   Number of specimens: 1   Name of specimens:1. Right posterior nasal mass- permanent   Release to patient->Immediate   Specimen total (fresh, frozen, permanent):->1           , Troponin No results for input(s): "TROPONINIHS" in the last 48 hours., and All pertinent lab results from the last 24 hours have been reviewed.     "

## 2025-07-07 NOTE — EICU
Intervention Initiated From:  [facility] / EICU    Salvador intervened regarding:  Rounding (Video assessment)  new pt  Virtual ICU Admission    Admit Date: 2025  LOS: 0  Code Status: Full Code   : 1965  Bed: [id number] A:     Diagnosis: <principal problem not specified>    Patient  has a past medical history of Atrial fibrillation, History of prior ablation treatment, Hypothyroidism, and S/P ablation of atrial fibrillation.    Last VS: /84   Pulse (!) 160   Temp 98.3 °F (36.8 °C) (Oral)   Resp 20   Ht 6' (1.829 m)   Wt 101.2 kg (223 lb)   SpO2 100%   BMI 30.24 kg/m²       VICU Review    VICU nurse assessment :  Chitina completed, LDA documentation reconciliation completed, and VTE prophylaxis review        Nursing orders placed : IP FAIZA Peripheral IV Access

## 2025-07-07 NOTE — PLAN OF CARE
[facility] - Cardiac Intensive Care  Initial Discharge Assessment       Primary Care Provider: Love Carlisle NP    Admission Diagnosis: Atrial flutter [I48.92]  Tachycardia [R00.0]  Atrial tachycardia [I47.19]    Admission Date: 7/7/2025  Expected Discharge Date: 7/10/2025    Transition of Care Barriers: None    Payor: AETNA / Plan: [employer]ET[employer] CHOICE POS / Product Type: Commercial /     Extended Emergency Contact Information  Primary Emergency Contact: JohnHannahClaudia  Address: [address] [place] [address] [place]  Mobile Phone: [phone]  Relation: Spouse    Discharge Plan A: Home with family  Discharge Plan B: Home      [id number] - [place], [place] - [address] AT [facility] [address]  [address]  [place] [address]  Phone: [phone] Fax: [phone]    [facility]  [address] [address]  [place] [facility] [address]  Phone: [phone] Fax: [phone]      Initial Assessment (most recent)       Adult Discharge Assessment - 07/07/25 1711          Discharge Assessment    Assessment Type Discharge Planning Assessment     Confirmed/corrected address, phone number and insurance Yes     Confirmed Demographics Correct on Facesheet     Source of Information patient;family;health record     Communicated SEB with patient/caregiver Yes     People in Home spouse;child(driss), adult     Name(s) of People in Home Claudiahernan Lopez (spouse) [phone] (son)     Do you expect to return to your current living situation? Yes     Do you have help at home or someone to help you manage your care at home? Yes     Who are your caregiver(s) and their phone number(s)? Claudia Lopez (spouse) [phone] (son)     Prior to hospitilization cognitive status: Alert/Oriented     Current cognitive status: Alert/Oriented     Walking or Climbing Stairs Difficulty no     Dressing/Bathing Difficulty no     Equipment Currently Used at Home none      Readmission within 30 days? No     Patient currently being followed by outpatient case management? No     Do you currently have service(s) that help you manage your care at home? No     Do you take prescription medications? Yes     Do you have prescription coverage? Yes     Do you have any problems affording any of your prescribed medications? No     Is the patient taking medications as prescribed? yes     Who is going to help you get home at discharge? Claudia Lopez (spouse) [phone]     How do you get to doctors appointments? family or friend will provide     Are you on dialysis? No     Do you take coumadin? No     Discharge Plan A Home with family     Discharge Plan B Home     Discharge Plan discussed with: Patient;Adult children     Transition of Care Barriers None                   SW met with pt and son Mandeep at bedside to discuss discharge planning.  Pt lives with wife Claudia and Mandeep and is independent with ambulation and ADLs.  No DME, HH, dialysis, or coumadin.  PCP is Love Carlisle NP.  Pt will have transportation and assistance from his wife at discharge.  Discharge Plan A and Plan B have been determined by review of patient's clinical status, future medical and therapeutic needs, and coverage/benefits for post-acute care in coordination with multidisciplinary team members.  MIREYA name and ext on whiteboard.  Will continue to follow.      Christina Wright LMSW  [facility]  [id number]

## 2025-07-07 NOTE — HPI
59 yr old M with paroxysmal AFIB s/p redo-PVI x 3 and mitral annular flutter ablation by Dr. Chaudhari in March of 2021, Hypothyroidism, hld who presents today for tachycardia.   He follows up with EP services with [facility] Dr. Almanza. He was last seen by Dr. Argueta in 2023.   Patient reports episodes of feeling extra heart beat, but denies lightheadedness or dizziness.   Patient denies CP, SOB, palpitations, orthopnea, PND, presyncope, LOC, swelling, or claudication. Patient monitors home BP and ranges 130s. Patient reports medication compliance without side effects.  Patient does exercise regularly and cut his grass without limitations.     ECHO 9/6/2022 notes preserved LV function.  Treadmill stress ECG 9/26/2022 noted no ischemia.     ECGs from 2/4-5/2021 and 9/4/2022 are consistent with atypical atrial flutter.    At this time, he presents with atypical aflutter based on 12-lead ECG. On telemetry he has been consistently in HR's at 160 with minimal variation despite being placed on diltiazem gtt. Notably he was just seen in ED on 7/5 and was noted to be in tachycardic rhythm. He was discharged after chemical cardioversion with cardizem and was also prescribed Eliquis and instructed to follow-up with his cardiologist. He is HDS. Labs reviewed, are at baseline. Overall in his usual state of health, denies significant ETOH use, viral illness, or other triggers. He has just been on ASA at home. He has been started on apixaban on 7/5.     EP history:  S/p redo-PVI x 3 and mitral annular flutter ablation by Dr. Chaudhari in March of 2021. At that ablation procedure his right veins required re-isolation. The patient presented in atrial flutter which per procedure report was consistent with mitral annular flutter. He underwent a lateral mitral isthmus ablation. He did well until he presented to the ER on 9/4/2022 with palpitations and was in AFL with RVR. Plan was to give as needed diltiazem and if it occurs again  would be him back to the lab. He was last seen by Dr. Argueta in 2023. He was seen again by Dr. Chaudhari in June 2024 after hospital stay for Afib with RVR (was chemically cardioverted at that time). He was prescribed flecainide prn along with diltiazem prn. Over the last year he has not had any Afib and has not needed to take these medications.

## 2025-07-07 NOTE — SUBJECTIVE & OBJECTIVE
Past Medical History:   Diagnosis Date    Atrial fibrillation     History of prior ablation treatment     Hypothyroidism     S/P ablation of atrial fibrillation        Past Surgical History:   Procedure Laterality Date    CARDIAC ELECTROPHYSIOLOGY STUDY AND ABLATION      COLONOSCOPY  12/22/2017    internal hemorrhoids, otherwise normal = repeat in 10 years - Dr. Cally Fuchs with [facility]    ESOPHAGOGASTRODUODENOSCOPY N/A 10/28/2021    Procedure: EGD (ESOPHAGOGASTRODUODENOSCOPY);  Surgeon: Patricia Soni MD;  Location: [facility];  Service: Endoscopy;  Laterality: N/A;    KNEE SURGERY Right     NASAL SEPTOPLASTY N/A 07/29/2021    Procedure: SEPTOPLASTY, NOSE;  Surgeon: Josué Villafuerte MD;  [facility];  Service: ENT;  Laterality: N/A;    NASAL TURBINATE REDUCTION Bilateral 07/29/2021    Procedure: REDUCTION, NASAL TURBINATE;  Surgeon: Josué Villafuerte MD;  [facility];  Service: ENT;  Laterality: Bilateral;    SHOULDER ARTHROSCOPY Right        Review of patient's allergies indicates:  No Known Allergies    Current Facility-Administered Medications on File Prior to Encounter   Medication    [COMPLETED] diltiaZEM 125 mg in D5W 125 mL infusion    [COMPLETED] diltiaZEM injection 25.5 mg    [COMPLETED] diltiaZEM injection 35.5 mg    [COMPLETED] diltiaZEM tablet 30 mg    [COMPLETED] enoxaparin injection 100 mg    [COMPLETED] sodium chloride 0.9% bolus 1,000 mL 1,000 mL     Current Outpatient Medications on File Prior to Encounter   Medication Sig    apixaban (ELIQUIS) 5 mg Tab Take 1 tablet (5 mg total) by mouth 2 (two) times daily.    azelastine (ASTELIN) 137 mcg (0.1 %) nasal spray 1 spray (137 mcg total) by Nasal route 2 (two) times daily.    fluticasone propionate (FLONASE) 50 mcg/actuation nasal spray 2 sprays (100 mcg total) by Each Nostril route once daily.    levocetirizine (XYZAL) 5 MG tablet Take 1 tablet (5 mg total) by mouth every evening.    rosuvastatin (CRESTOR) 20 MG  tablet Take 20 mg by mouth every evening.    SYNTHROID 200 mcg tablet Taking 1 tablet daily every day except for Sundays.    tirzepatide, weight loss, (ZEPBOUND) 10 mg/0.5 mL PnIj Inject 10 mg (one pen) into the skin every 7 days.    aspirin (ECOTRIN) 81 MG EC tablet Take 81 mg by mouth once daily.    diltiaZEM (CARDIZEM) 30 MG tablet Take 1 tablet (30 mg total) by mouth every 12 (twelve) hours.    flecainide (TAMBOCOR) 150 MG Tab Take 150 mg by mouth as needed.    multivit-min/FA/lycopen/lutein (CENTRUM SILVER MEN ORAL) Take 1 tablet by mouth once daily.    omega-3 fatty acids-fish oil 684-1,200 mg CpDR Take 1,000 mg by mouth.    [DISCONTINUED] cetirizine (ZYRTEC) 10 MG tablet Take 10 mg by mouth once daily.     Family History       Problem Relation (Age of Onset)    Atrial fibrillation Mother    Cancer Father    Hypertension Mother    No Known Problems Brother          Tobacco Use    Smoking status: Never     Passive exposure: Never    Smokeless tobacco: Never   Substance and Sexual Activity    Alcohol use: Yes     Comment: once a month or less    Drug use: Never    Sexual activity: Yes     Partners: Female     Review of Systems   Constitutional: Negative for chills, fever, malaise/fatigue and weight loss.   Cardiovascular:  Positive for irregular heartbeat and palpitations. Negative for chest pain, dyspnea on exertion and leg swelling.   Respiratory:  Negative for cough and shortness of breath.      Objective:     Vital Signs (Most Recent):  Temp: 98.7 °F (37.1 °C) (07/07/25 0701)  Pulse: (!) 158 (07/07/25 0901)  Resp: (!) 33 (07/07/25 0901)  BP: 116/76 (07/07/25 0901)  SpO2: 100 % (07/07/25 0901) Vital Signs (24h Range):  Temp:  [98.3 °F (36.8 °C)-98.7 °F (37.1 °C)] 98.7 °F (37.1 °C)  Pulse:  [] 158  Resp:  [13-35] 33  SpO2:  [94 %-100 %] 100 %  BP: (111-173)/(57-97) 116/76       Weight: 101 kg (222 lb 10.6 oz)  Body mass index is 30.2 kg/m².    SpO2: 100 %        Physical Exam  Constitutional:        General: He is not in acute distress.     Appearance: Normal appearance. He is not ill-appearing.   HENT:      Head: Normocephalic and atraumatic.   Cardiovascular:      Rate and Rhythm: Regular rhythm. Tachycardia present.      Pulses: Normal pulses.      Heart sounds: No murmur heard.     Comments: No JVD present  Pulmonary:      Effort: Pulmonary effort is normal. No respiratory distress.      Breath sounds: Normal breath sounds. No wheezing or rales.   Abdominal:      General: Abdomen is flat. There is no distension.      Palpations: Abdomen is soft.      Tenderness: There is no abdominal tenderness.   Musculoskeletal:      Right lower leg: No edema.      Left lower leg: No edema.   Skin:     General: Skin is warm and dry.   Neurological:      General: No focal deficit present.      Mental Status: He is alert and oriented to person, place, and time.            Significant Labs: EP:   Recent Labs   Lab 07/05/25  1824 07/06/25  2232    142   K 3.7 3.5    108   CO2 22* 25   * 103   BUN 22* 23*   CREATININE 0.9 1.1   CALCIUM 9.5 9.7   PROT 7.8 8.4   ALBUMIN 4.1 4.3   BILITOT 0.5 0.6   ALKPHOS 63 73   AST 33 34   ALT 27 28   ANIONGAP 9 9   WBC 8.78 9.85   HGB 14.9 15.1   HCT 44.4 46.1    202       Significant Imaging: Echocardiogram: Transthoracic echo (TTE) complete (Cupid Only):   Results for orders placed or performed during the hospital encounter of 07/07/25   Echo   Result Value Ref Range    LV Diastolic Volume 121 mL    Echo EF Estimated 64 %    LV Systolic Volume 43 mL    IVS 1.1 0.6 - 1.1 cm    LVIDd 5.1 3.5 - 6.0 cm    LVIDs 3.3 2.1 - 4.0 cm    LVOT diameter 2.3 cm    PW 0.9 0.6 - 1.1 cm    AV LVOT peak gradient 4 mmHg    LVOT mn grad 2 mmHg    LVOT peak stefan 1.0 m/s    LVOT peak VTI 14.7 cm    RV- rivera basal diam 3.5 cm    LA size 3.6 cm    Left Atrium Major Axis 5.3 cm    Left Atrium Minor Axis 5.4 cm    LA Vol (MOD) 58 mL    RA Major [place] 5.39 cm    AV valve area 3.9 cm2    AV  area by cont VTI 3.9 cm2    AV peak gradient 5 mmHg    AV mean gradient 2 mmHg    Ao peak alan 1.1 m/s    Ao VTI 15.8 cm    MV Peak E Alan 0.93 m/s    TDI LATERAL 0.13 m/s    TDI SEPTAL 0.11 m/s    Ascending aorta 2.9 cm    STJ 2.7 cm    Sinus 3.3 cm    LA WIDTH 3.1 cm    RA Width 2.74 cm    TAPSE 1.2 cm    BSA 2.26 m2    LVOT stroke volume 61.0 cm3    LV Systolic Volume Index 19.3 mL/m2    LV Diastolic Volume Index 54.26 mL/m2    LVOT area 4.2 cm2    FS 35.3 28 - 44 %    Left Ventricle Relative Wall Thickness 0.35 cm    LV mass 188.0 g    LV Mass Index 84.3 g/m2    E/E' ratio 8 m/s    LV SEPTAL E/E' RATIO 8.5 m/s    TANO 23 mL/m2    LV LATERAL E/E' RATIO 7.2 m/s    LA Vol 51 cm3    RV/LV Ratio 0.69 cm    TANO (MOD) 26 mL/m2    AV Velocity Ratio 0.91     AV index (prosthetic) 0.93     NEO by Velocity Ratio 3.8 cm²    ASI 1.5 cm/m2    ASI 1.3 cm/m2    Mean e' 0.12 m/s    ZLVIDS -2.93     ZLVIDD -4.35     EF 60 %    Est. RA pres 8 mmHg    Narrative      Left Ventricle: Ventricular mass is normal. Mild septal thickening.   There is normal systolic function with a visually estimated ejection   fraction of 55 - 60%. Ejection fraction is approximately 60%.    Right Ventricle: The right ventricle is normal in size measuring 3.5   cm. Systolic function is borderline low.    Right Atrium: The right atrium is mildly dilated .    IVC/SVC: Intermediate venous pressure at 8 mmHg.

## 2025-07-07 NOTE — CONSULTS
[facility] - Cardiac Intensive Care  Cardiac Electrophysiology  Consult Note    Admission Date: 7/7/2025  Code Status: Full Code   Attending Provider: Sriram Devlin III, MD  Consulting Provider: Baljit Jarrell MD  Principal Problem:<principal problem not specified>    Inpatient consult to Electrophysiology  Consult performed by: Baljit Jarrell MD  Consult ordered by: Shilpi Espinal MD  Reason for consult: atypical atrial flutter        Subjective:     Chief Complaint:  Tachycardia     HPI:   59 yr old M with paroxysmal AFIB s/p redo-PVI x 3 and mitral annular flutter ablation by Dr. Chaudhari in March of 2021, Hypothyroidism, hld who presents today for tachycardia.   He follows up with EP services with [facility] Dr. Almanza. He was last seen by Dr. Argueta in 2023.   Patient reports episodes of feeling extra heart beat, but denies lightheadedness or dizziness.   Patient denies CP, SOB, palpitations, orthopnea, PND, presyncope, LOC, swelling, or claudication. Patient monitors home BP and ranges 130s. Patient reports medication compliance without side effects.  Patient does exercise regularly and cut his grass without limitations.     ECHO 9/6/2022 notes preserved LV function.  Treadmill stress ECG 9/26/2022 noted no ischemia.     ECGs from 2/4-5/2021 and 9/4/2022 are consistent with atypical atrial flutter.    At this time, he presents with atypical aflutter based on 12-lead ECG. On telemetry he has been consistently in HR's at 160 with minimal variation despite being placed on diltiazem gtt. Notably he was just seen in ED on 7/5 and was noted to be in tachycardic rhythm. He was discharged after chemical cardioversion with cardizem and was also prescribed Eliquis and instructed to follow-up with his cardiologist. He is HDS. Labs reviewed, are at baseline. Overall in his usual state of health, denies significant ETOH use, viral illness, or other triggers. He has just been on ASA at home. He has been started on apixaban  on 7/5.     EP history:  S/p redo-PVI x 3 and mitral annular flutter ablation by Dr. Chaudhari in March of 2021. At that ablation procedure his right veins required re-isolation. The patient presented in atrial flutter which per procedure report was consistent with mitral annular flutter. He underwent a lateral mitral isthmus ablation. He did well until he presented to the ER on 9/4/2022 with palpitations and was in AFL with RVR. Plan was to give as needed diltiazem and if it occurs again would be him back to the lab. He was last seen by Dr. Argueta in 2023. He was seen again by Dr. Chaudhari in June 2024 after hospital stay for Afib with RVR (was chemically cardioverted at that time). He was prescribed flecainide prn along with diltiazem prn. Over the last year he has not had any Afib and has not needed to take these medications.         Past Medical History:   Diagnosis Date    Atrial fibrillation     History of prior ablation treatment     Hypothyroidism     S/P ablation of atrial fibrillation        Past Surgical History:   Procedure Laterality Date    CARDIAC ELECTROPHYSIOLOGY STUDY AND ABLATION      COLONOSCOPY  12/22/2017    internal hemorrhoids, otherwise normal = repeat in 10 years - Dr. Cally Fuchs with [facility]    ESOPHAGOGASTRODUODENOSCOPY N/A 10/28/2021    Procedure: EGD (ESOPHAGOGASTRODUODENOSCOPY);  Surgeon: Patricia Soni MD;  Location: [facility];  Service: Endoscopy;  Laterality: N/A;    KNEE SURGERY Right     NASAL SEPTOPLASTY N/A 07/29/2021    Procedure: SEPTOPLASTY, NOSE;  Surgeon: Josué Villafuerte MD;  [facility];  Service: ENT;  Laterality: N/A;    NASAL TURBINATE REDUCTION Bilateral 07/29/2021    Procedure: REDUCTION, NASAL TURBINATE;  Surgeon: Josué Villafuerte MD;  Location: [facility] OR [address] [place] [address];  Service: ENT;  Laterality: Bilateral;    SHOULDER ARTHROSCOPY Right        Review of patient's allergies indicates:  No Known Allergies    Current Facility-Administered Medications on  File Prior to Encounter   Medication    [COMPLETED] diltiaZEM 125 mg in D5W 125 mL infusion    [COMPLETED] diltiaZEM injection 25.5 mg    [COMPLETED] diltiaZEM injection 35.5 mg    [COMPLETED] diltiaZEM tablet 30 mg    [COMPLETED] enoxaparin injection 100 mg    [COMPLETED] sodium chloride 0.9% bolus 1,000 mL 1,000 mL     Current Outpatient Medications on File Prior to Encounter   Medication Sig    apixaban (ELIQUIS) 5 mg Tab Take 1 tablet (5 mg total) by mouth 2 (two) times daily.    azelastine (ASTELIN) 137 mcg (0.1 %) nasal spray 1 spray (137 mcg total) by Nasal route 2 (two) times daily.    fluticasone propionate (FLONASE) 50 mcg/actuation nasal spray 2 sprays (100 mcg total) by Each Nostril route once daily.    levocetirizine (XYZAL) 5 MG tablet Take 1 tablet (5 mg total) by mouth every evening.    rosuvastatin (CRESTOR) 20 MG tablet Take 20 mg by mouth every evening.    SYNTHROID 200 mcg tablet Taking 1 tablet daily every day except for Sundays.    tirzepatide, weight loss, (ZEPBOUND) 10 mg/0.5 mL PnIj Inject 10 mg (one pen) into the skin every 7 days.    aspirin (ECOTRIN) 81 MG EC tablet Take 81 mg by mouth once daily.    diltiaZEM (CARDIZEM) 30 MG tablet Take 1 tablet (30 mg total) by mouth every 12 (twelve) hours.    flecainide (TAMBOCOR) 150 MG Tab Take 150 mg by mouth as needed.    multivit-min/FA/lycopen/lutein (CENTRUM SILVER MEN ORAL) Take 1 tablet by mouth once daily.    omega-3 fatty acids-fish oil 684-1,200 mg CpDR Take 1,000 mg by mouth.    [DISCONTINUED] cetirizine (ZYRTEC) 10 MG tablet Take 10 mg by mouth once daily.     Family History       Problem Relation (Age of Onset)    Atrial fibrillation Mother    Cancer Father    Hypertension Mother    No Known Problems Brother          Tobacco Use    Smoking status: Never     Passive exposure: Never    Smokeless tobacco: Never   Substance and Sexual Activity    Alcohol use: Yes     Comment: once a month or less    Drug use: Never    Sexual activity: Yes      Partners: Female     Review of Systems   Constitutional: Negative for chills, fever, malaise/fatigue and weight loss.   Cardiovascular:  Positive for irregular heartbeat and palpitations. Negative for chest pain, dyspnea on exertion and leg swelling.   Respiratory:  Negative for cough and shortness of breath.      Objective:     Vital Signs (Most Recent):  Temp: 98.7 °F (37.1 °C) (07/07/25 0701)  Pulse: (!) 158 (07/07/25 0901)  Resp: (!) 33 (07/07/25 0901)  BP: 116/76 (07/07/25 0901)  SpO2: 100 % (07/07/25 0901) Vital Signs (24h Range):  Temp:  [98.3 °F (36.8 °C)-98.7 °F (37.1 °C)] 98.7 °F (37.1 °C)  Pulse:  [] 158  Resp:  [13-35] 33  SpO2:  [94 %-100 %] 100 %  BP: (111-173)/(57-97) 116/76       Weight: 101 kg (222 lb 10.6 oz)  Body mass index is 30.2 kg/m².    SpO2: 100 %        Physical Exam  Constitutional:       General: He is not in acute distress.     Appearance: Normal appearance. He is not ill-appearing.   HENT:      Head: Normocephalic and atraumatic.   Cardiovascular:      Rate and Rhythm: Regular rhythm. Tachycardia present.      Pulses: Normal pulses.      Heart sounds: No murmur heard.     Comments: No JVD present  Pulmonary:      Effort: Pulmonary effort is normal. No respiratory distress.      Breath sounds: Normal breath sounds. No wheezing or rales.   Abdominal:      General: Abdomen is flat. There is no distension.      Palpations: Abdomen is soft.      Tenderness: There is no abdominal tenderness.   Musculoskeletal:      Right lower leg: No edema.      Left lower leg: No edema.   Skin:     General: Skin is warm and dry.   Neurological:      General: No focal deficit present.      Mental Status: He is alert and oriented to person, place, and time.            Significant Labs: EP:   Recent Labs   Lab 07/05/25  1824 07/06/25  2232    142   K 3.7 3.5    108   CO2 22* 25   * 103   BUN 22* 23*   CREATININE 0.9 1.1   CALCIUM 9.5 9.7   PROT 7.8 8.4   ALBUMIN 4.1 4.3   BILITOT  0.5 0.6   ALKPHOS 63 73   AST 33 34   ALT 27 28   ANIONGAP 9 9   WBC 8.78 9.85   HGB 14.9 15.1   HCT 44.4 46.1    202       Significant Imaging: Echocardiogram: Transthoracic echo (TTE) complete (Cupid Only):   Results for orders placed or performed during the hospital encounter of 07/07/25   Echo   Result Value Ref Range    LV Diastolic Volume 121 mL    Echo EF Estimated 64 %    LV Systolic Volume 43 mL    IVS 1.1 0.6 - 1.1 cm    LVIDd 5.1 3.5 - 6.0 cm    LVIDs 3.3 2.1 - 4.0 cm    LVOT diameter 2.3 cm    PW 0.9 0.6 - 1.1 cm    AV LVOT peak gradient 4 mmHg    LVOT mn grad 2 mmHg    LVOT peak alan 1.0 m/s    LVOT peak VTI 14.7 cm    RV- rivera basal diam 3.5 cm    LA size 3.6 cm    Left Atrium Major Axis 5.3 cm    Left Atrium Minor Axis 5.4 cm    LA Vol (MOD) 58 mL    RA Major [place] 5.39 cm    AV valve area 3.9 cm2    AV area by cont VTI 3.9 cm2    AV peak gradient 5 mmHg    AV mean gradient 2 mmHg    Ao peak alan 1.1 m/s    Ao VTI 15.8 cm    MV Peak E Alan 0.93 m/s    TDI LATERAL 0.13 m/s    TDI SEPTAL 0.11 m/s    Ascending aorta 2.9 cm    STJ 2.7 cm    Sinus 3.3 cm    LA WIDTH 3.1 cm    RA Width 2.74 cm    TAPSE 1.2 cm    BSA 2.26 m2    LVOT stroke volume 61.0 cm3    LV Systolic Volume Index 19.3 mL/m2    LV Diastolic Volume Index 54.26 mL/m2    LVOT area 4.2 cm2    FS 35.3 28 - 44 %    Left Ventricle Relative Wall Thickness 0.35 cm    LV mass 188.0 g    LV Mass Index 84.3 g/m2    E/E' ratio 8 m/s    LV SEPTAL E/E' RATIO 8.5 m/s    TANO 23 mL/m2    LV LATERAL E/E' RATIO 7.2 m/s    LA Vol 51 cm3    RV/LV Ratio 0.69 cm    TANO (MOD) 26 mL/m2    AV Velocity Ratio 0.91     AV index (prosthetic) 0.93     NEO by Velocity Ratio 3.8 cm²    ASI 1.5 cm/m2    ASI 1.3 cm/m2    Mean e' 0.12 m/s    ZLVIDS -2.93     ZLVIDD -4.35     EF 60 %    Est. RA pres 8 mmHg    Narrative      Left Ventricle: Ventricular mass is normal. Mild septal thickening.   There is normal systolic function with a visually estimated ejection   fraction  of 55 - 60%. Ejection fraction is approximately 60%.    Right Ventricle: The right ventricle is normal in size measuring 3.5   cm. Systolic function is borderline low.    Right Atrium: The right atrium is mildly dilated .    IVC/SVC: Intermediate venous pressure at 8 mmHg.          CHADS2 for A-FIB Stroke Risk  Age?: < 65 years old  CHF history: No  HTN history: No  Previous Stroke Sx or TIA: No  Vascular Disease History?: No  Diabetes Mellitus History: No  Female?: No  [id number] Total Score: 0      Assessment and Plan:     Atypical atrial flutter  Patient is a 59 y.o. male with hx of paroxysmal Afib (s/p re-do PVI x3), typical and atypical flutter (s/p mitral annular flutter ablation by Dr. Chaudhari March 2021) (follows with [facility], Dr. Chaudhari, and [facility]. Argueta), HLD who presents with tachycardia concerning for atypical atrial flutter. His CHADSVASC score is 0 so he had previously been maintained on just ASA. He was seen in the ED at [facility] on [place]/5 and noted to be in atrial flutter rhythm. He cardioverted after administration of diltiazem and was prescribed Eliquis at that time. He returns still in atypical flutter rhythm in 160s, hemodynamically stable and minimally symptomatic. He is currently on diltiazem gtt and heparin gtt.    Recommendations:  - OK for diet today.  - Tentatively plan for Aflutter ablation on Wednesday 7/9. Please keep NPO MN Tuesday  - Continue heparin gtt as you are doing. Will need to switch back to DOAC post ablation.  - OK to continue diltiazem gtt. Start digoxin load for additional rate control  - Staffed with Dr. Argueta. EP will continue to follow.        Thank you for your consult. I will follow-up with patient. Please contact us if you have any additional questions.    Baljit Jarrell MD  Cardiac Electrophysiology  [facility] - Cardiac Intensive [facility]

## 2025-07-07 NOTE — PLAN OF CARE
Blood cultures drawn  Digoxin x2     No acute events on shift. POC reviewed with patient and family at bedside. Questions/concerns addressed. Emotional support provided.

## 2025-07-07 NOTE — ED PROVIDER NOTES
Encounter Date: 7/7/2025       History     Chief Complaint   Patient presents with    Transfer     Transfer from [place] for cardiology, arrives on Cardize gtt      Patient is a 59-year-old male with a past medical history of Afib, atrial tachycardia, status post multiple abrasions who arrives for evaluation of tachycardia.  Patient transferred here from [place] for evaluation by Cardiology.  Patient arrives with Cardizem drip.  Currently Cardizem drip running at 15 milligrams/hour.  Patient complaining of palpitations, but denies any chest pain, back pain, shortness of breath, fever, nausea, vomiting.       Review of patient's allergies indicates:  No Known Allergies  Past Medical History:   Diagnosis Date    Atrial fibrillation     History of prior ablation treatment     Hypothyroidism     S/P ablation of atrial fibrillation      Past Surgical History:   Procedure Laterality Date    CARDIAC ELECTROPHYSIOLOGY STUDY AND ABLATION      COLONOSCOPY  12/22/2017    internal hemorrhoids, otherwise normal = repeat in 10 years - Dr. Cally Fuchs with [facility]    ESOPHAGOGASTRODUODENOSCOPY N/A 10/28/2021    Procedure: EGD (ESOPHAGOGASTRODUODENOSCOPY);  Surgeon: Patricia Soni MD;  Location: [facility];  Service: Endoscopy;  Laterality: N/A;    KNEE SURGERY Right     NASAL SEPTOPLASTY N/A 07/29/2021    Procedure: SEPTOPLASTY, NOSE;  Surgeon: Josué Villafuerte MD;  Location: [facility] OR [address] [place] [address];  Service: ENT;  Laterality: N/A;    NASAL TURBINATE REDUCTION Bilateral 07/29/2021    Procedure: REDUCTION, NASAL TURBINATE;  Surgeon: Josué Villafuerte MD;  Location: [facility] OR [address] [place] [address];  Service: ENT;  Laterality: Bilateral;    SHOULDER ARTHROSCOPY Right      Family History   Problem Relation Name Age of Onset    Hypertension Mother      Atrial fibrillation Mother      Cancer Father          esophageal cancer    No Known Problems Brother       Social History[1]  Review of Systems    Physical Exam     Initial Vitals [07/07/25  0108]   BP Pulse Resp Temp SpO2   118/74 (!) 154 20 98.4 °F (36.9 °C) 97 %      MAP       --         Physical Exam    Nursing note and vitals reviewed.  Constitutional: He appears well-developed and well-nourished. He is not diaphoretic. No distress.   HENT:   Head: Normocephalic and atraumatic.   Right Ear: External ear normal.   Left Ear: External ear normal.   Nose: Nose normal.   Eyes: Conjunctivae are normal. Right eye exhibits no discharge. Left eye exhibits no discharge. No scleral icterus.   Neck: No stridor present.   Normal range of motion.  Cardiovascular:  Regular rhythm, normal heart sounds and intact distal pulses.           No murmur heard.  Pulses:       Radial pulses are 2+ on the right side and 2+ on the left side.   Tachycardia.   Pulmonary/Chest: Breath sounds normal. No respiratory distress.   No increased work of breathing.   Abdominal: Abdomen is soft. He exhibits no distension. There is no abdominal tenderness. There is no rebound.   Musculoskeletal:         General: No tenderness or edema. Normal range of motion.      Cervical back: Normal range of motion.     Neurological: He is alert and oriented to person, place, and time.   Skin: Skin is warm and intact. Capillary refill takes less than 2 seconds. No rash and no abscess noted. No erythema. No pallor.   Psychiatric: He has a normal mood and affect.         ED Course   Procedures  Labs Reviewed   HEPATITIS C ANTIBODY - Normal       Result Value    Hep C Ab Interp Non-Reactive     HIV 1 / 2 ANTIBODY - Normal    HIV 1/2 Ag/Ab Non-Reactive     TSH - Normal    TSH 1.274     HEP C VIRUS HOLD SPECIMEN        ECG Results              EKG 12-lead (Final result)        Collection Time Result Time QRS Duration OHS QTC Calculation    07/07/25 06:21:34 07/07/25 08:15:02 110 538                     Final result by Interface, Lab In [facility] (07/07/25 08:15:12)                   Narrative:    Test Reason : R00.0,    Vent. Rate : 158 BPM     Atrial Rate :    0 BPM     P-R Int :    ms          QRS Dur : 110 ms      QT Int : 332 ms       P-R-T Axes :    -48  46 degrees    QTcB Int : 538 ms    Supraventricular tachycardia  Incomplete right bundle branch block  Left anterior fascicular block  Possible Anterolateral infarct (cited on or before 07-Jul-2025)  Abnormal ECG  When compared with ECG of 07-Jul-2025 02:58,  Vent. rate has increased by  78 bpm  Confirmed by Naman Storm ([id number]) on 7/7/2025 8:14:56 AM    Referred By: LANCE ROWLAND           Confirmed By: Naman Storm                                     EKG 12-lead (Final result)        Collection Time Result Time QRS Duration OHS QTC Calculation    07/07/25 02:40:00 07/07/25 08:46:25 110 489                     Final result by Interface, Lab In [facility] (07/07/25 08:46:35)                   Narrative:    Test Reason : R00.0,    Vent. Rate : 156 BPM     Atrial Rate : 156 BPM     P-R Int : 128 ms          QRS Dur : 110 ms      QT Int : 304 ms       P-R-T Axes :  93 -32  15 degrees    QTcB Int : 489 ms    Likely atrial flutter  Left axis deviation  Incomplete right bundle branch block  Nonspecific ST and/or T wave abnormalities  Abnormal ECG  When compared with ECG of 07-Jul-2025 02:11,  Variable AV block no longer present  Confirmed by Kenroy Allred ([id number]) on 7/7/2025 8:46:21 AM    Referred By: LANCE ROWLAND           Confirmed By: Kenroy Allred                                  Imaging Results              X-Ray Chest AP Portable (Final result)  Result time 07/07/25 05:52:17      Final result by Edward Kennedy DO (07/07/25 05:52:17)                   Impression:      No acute abnormality.      Electronically signed by: Edward Kennedy  Date:    07/07/2025  Time:    05:52               Narrative:    EXAMINATION:  XR CHEST AP PORTABLE    CLINICAL HISTORY:  Unspecified atrial flutter    TECHNIQUE:  Single frontal view of the chest was performed.    COMPARISON:  09/05/2022.    FINDINGS:  The lungs are well expanded  and clear. No focal opacities are seen. The pleural spaces are clear. The cardiac silhouette is unremarkable. The visualized osseous structures are unremarkable.                                       Medications   sodium chloride 0.9% flush 10 mL (has no administration in time range)   diltiaZEM (CARDIZEM) 125 mg in 0.9% NaCl 125 mL infusion (0 mg/hr Intravenous Paused 7/7/25 0659)   levothyroxine tablet 100 mcg (has no administration in time range)   atorvastatin tablet 80 mg (80 mg Oral Given 7/7/25 0829)   apixaban tablet 5 mg (5 mg Oral Given 7/7/25 0829)   mupirocin 2 % ointment ( Nasal Given 7/7/25 0829)   diltiaZEM (CARDIZEM) 125 mg in 0.9% NaCl 125 mL infusion (15 mg/hr Intravenous New Bag 7/7/25 0145)   diltiaZEM injection 25 mg (25 mg Intravenous Given 7/7/25 0156)   diltiaZEM injection 30 mg (30 mg Intravenous Given 7/7/25 0255)   potassium chloride CR capsule 30 mEq (30 mEq Oral Given 7/7/25 0829)     Medical Decision Making  Patient is a 59 year old male  with history as stated above.    Differential diagnosis includes, but is not limited to:    -a flutter  -atrial tachycardia  -electrolyte disturbance    Management:     Cardizem drip continued in ED. patient is hemodynamically stable and not complaining of any chest pain, back pain, shortness of breath.  Saturating well on room air.  Consulted cardiology recommends continuing Cardizem drip and recommended two IV pushes of Cardizem.  Continued tachycardia after administration of IV pushes of Cardizem.  Cardiology updated on patient's vitals status post cortisone pushes.  Patient admitted to CCU for further workup and management.    Amount and/or Complexity of Data Reviewed  External Data Reviewed: radiology.     Details: === Results for orders placed during the hospital encounter of 09/04/22 ===    Echo    - Interpretation Summary -  · The left ventricle is normal in size with concentric remodeling and normal systolic function.  · The estimated ejection  fraction is 60%.  · Normal left ventricular diastolic function.  · Normal right ventricular size with normal right ventricular systolic function.  · Mild aortic regurgitation.  · Mild mitral regurgitation.  · Mild pulmonic regurgitation.  · Normal central venous pressure (3 mmHg).     Labs: ordered.  Radiology: ordered. Decision-making details documented in ED Course.  ECG/medicine tests: ordered and independent interpretation performed. Decision-making details documented in ED Course.    Risk  Prescription drug management.  Decision regarding hospitalization.               ED Course as of 07/07/25 0907   Mon Jul 07, 2025 0124 Consulted cardiology.  Case discussed with cardiology. [AG]   0128    0142 EKG 12-lead  Atrial flutter likely two-to-one with incomplete right bundle branch block, no acute ischemic process [AC]   0152 Cardiology recommends 25 mg IV push of Cardizem.  Also recommending waiting 15-20 minutes after IV push and reassessment afterwards.  If there is failure of improvement of heart rate, they also recommend 30-35 mg IV push of Cardizem afterwards. [AG]   0211 , SBP 110s [AG]   0218 Repeat EKG:  Atrial fibrillation with RVR, incomplete right bundle [AC]   0218 Pulse: 82  Spurious, patient is still in 130s [AC]   0218 X-Ray Chest AP Portable  No acute findings [AC]   0237 Patient with HR in the 150s. Ordered 30 mg IV push of diltiazem per cardiology recommendations. [AG]   0302 Repeat EKG: sinus but possible aflutter, slower, HR 80 [AC]   0318 Heart rate currently in low 80s.  Status post 2nd IV push of diltiazem 23 minutes ago. [AG]   0401 Patient's heart rate in the 150s.  Called Cardiology. [AG]   0527 Though there was a brief period where the patient has a heart rate of sinus rhythm versus a flutter in the 80s, he has otherwise been persistently in the 150s. [AC]      ED Course User Index  [AC] He Navas, DO  [AG] Jason Garza MD                           Clinical  Impression:  Final diagnoses:  [R00.0] Tachycardia  [I48.92] Atrial flutter          ED Disposition Condition    Admit                           [1]   Social History  Tobacco Use    Smoking status: Never     Passive exposure: Never    Smokeless tobacco: Never   Substance Use Topics    Alcohol use: Yes     Comment: once a month or less    Drug use: Never        Jason Garza MD  Resident  07/07/25 0907

## 2025-07-07 NOTE — NURSING
Pt arrived from ED on Diltiazem gtt at 15mg. Pt connected to ICU monitor. EKG done and sent to muse. CHG bath completed. MD Teddy notified of pt's arrival.

## 2025-07-07 NOTE — HOSPITAL COURSE
Admitted to CICU after being placed on diltiazem gtt for aflutter with RVR. Continued on apixaban. He has been stable since admission. EP consulted for consideration of cardioversion versus re-do ablation.

## 2025-07-07 NOTE — H&P
[facility] - Cardiac Intensive [facility]  Cardiology  History and Physical     Patient Name: Walt Lopez  MRN: [id number]  Admission Date: 7/7/2025  Code Status: Full Code   Attending Provider: Sriram Devlin III, MD   Primary Care Physician: Love Carlisle NP  Principal Problem:<principal problem not specified>    Patient information was obtained from patient and ER records.     Subjective:        HPI:  59 yro M with paroxysmal AFIB s/p redo-PVI x 3 and mitral annular flutter ablation by Dr. Chaudhari in March of 2021, Hypothyroidism, hld who presents today for tachycardia.   He follows up with  EP services with [facility] services Dr. Almanza. He was last seen by Dr. Argueta in 2023.   Patient reports episodes of feeling extra heart beat, but denies lightheadedness or dizziness.   Patient denies CP, SOB, palpitations, orthopnea, PND, presyncope, LOC, swelling, or claudication. Patient monitors home BP and ranges 130s. Patient reports medication compliance without side effects  Patient does exercise regularly and cut his grass without limitations.   Ep history:  s/p redo-PVI x 3 and mitral annular flutter ablation by Dr. Chaudhari in March of 2021. At that ablation procedure his right veins required re-isolation. The patient presented in atrial flutter which per procedure report was consistent with mitral annular flutter. He underwent a lateral mitral isthmus ablation. He did well until he presented to the ER on 9/4/2022 with palpitations and was in AFL with RVR. Plan was to give as needed diltiazem and if it occurs again would be him back to the lab.        Past Medical History:   Diagnosis Date    Atrial fibrillation     History of prior ablation treatment     Hypothyroidism     S/P ablation of atrial fibrillation        Past Surgical History:   Procedure Laterality Date    CARDIAC ELECTROPHYSIOLOGY STUDY AND ABLATION      COLONOSCOPY  12/22/2017    internal hemorrhoids, otherwise normal = repeat in 10 years -   Cally Fuchs with [facility]    ESOPHAGOGASTRODUODENOSCOPY N/A 10/28/2021    Procedure: EGD (ESOPHAGOGASTRODUODENOSCOPY);  Surgeon: Patricia Soni MD;  [facility];  Service: Endoscopy;  Laterality: N/A;    KNEE SURGERY Right     NASAL SEPTOPLASTY N/A 07/29/2021    Procedure: SEPTOPLASTY, NOSE;  Surgeon: Josué Villafuerte MD;  [facility];  Service: ENT;  Laterality: N/A;    NASAL TURBINATE REDUCTION Bilateral 07/29/2021    Procedure: REDUCTION, NASAL TURBINATE;  Surgeon: Josué Villafuerte MD;  [facility];  Service: ENT;  Laterality: Bilateral;    SHOULDER ARTHROSCOPY Right        Review of patient's allergies indicates:  No Known Allergies    Current Facility-Administered Medications on File Prior to Encounter   Medication    [COMPLETED] diltiaZEM 125 mg in D5W 125 mL infusion    [COMPLETED] diltiaZEM injection 25.5 mg    [COMPLETED] diltiaZEM injection 35.5 mg    [COMPLETED] diltiaZEM tablet 30 mg    [COMPLETED] enoxaparin injection 100 mg    [COMPLETED] sodium chloride 0.9% bolus 1,000 mL 1,000 mL     Current Outpatient Medications on File Prior to Encounter   Medication Sig    apixaban (ELIQUIS) 5 mg Tab Take 1 tablet (5 mg total) by mouth 2 (two) times daily.    aspirin (ECOTRIN) 81 MG EC tablet Take 81 mg by mouth once daily.    azelastine (ASTELIN) 137 mcg (0.1 %) nasal spray 1 spray (137 mcg total) by Nasal route 2 (two) times daily.    cetirizine (ZYRTEC) 10 MG tablet Take 10 mg by mouth.    diltiaZEM (CARDIZEM) 30 MG tablet Take 1 tablet (30 mg total) by mouth every 12 (twelve) hours.    flecainide (TAMBOCOR) 150 MG Tab Take 150 mg by mouth as needed.    fluticasone propionate (FLONASE) 50 mcg/actuation nasal spray 2 sprays (100 mcg total) by Each Nostril route once daily.    levocetirizine (XYZAL) 5 MG tablet Take 1 tablet (5 mg total) by mouth every evening.    multivit-min/FA/lycopen/lutein (CENTRUM SILVER MEN ORAL) Take 1 tablet by mouth once daily.    omega-3  fatty acids-fish oil 684-1,200 mg CpDR Take 1,000 mg by mouth.    rosuvastatin (CRESTOR) 20 MG tablet Take 20 mg by mouth every evening.    SYNTHROID 200 mcg tablet Taking 1 tablet daily every day except for Sundays.    tirzepatide, weight loss, (ZEPBOUND) 10 mg/0.5 mL PnIj Inject 10 mg (one pen) into the skin every 7 days.     Family History       Problem Relation (Age of Onset)    Atrial fibrillation Mother    Cancer Father    Hypertension Mother    No Known Problems Brother          Tobacco Use    Smoking status: Never     Passive exposure: Never    Smokeless tobacco: Never   Substance and Sexual Activity    Alcohol use: Yes     Comment: once a month or less    Drug use: Never    Sexual activity: Yes     Partners: Female     Review of Systems   Constitutional: Negative for chills, decreased appetite, diaphoresis, fever, malaise/fatigue and weight gain.   HENT:  Negative for congestion and ear discharge.    Eyes:  Negative for blurred vision.   Cardiovascular:  Positive for palpitations. Negative for chest pain, dyspnea on exertion, irregular heartbeat, leg swelling, orthopnea and paroxysmal nocturnal dyspnea.   Respiratory:  Negative for cough, shortness of breath, snoring and sputum production.    Skin:  Negative for color change, dry skin and rash.   Musculoskeletal:  Negative for back pain, falls, joint pain and neck pain.   Gastrointestinal:  Negative for bloating, abdominal pain, constipation and diarrhea.   Genitourinary:  Negative for dysuria, flank pain, hematuria and hesitancy.   Neurological:  Negative for focal weakness, headaches, numbness and seizures.   Psychiatric/Behavioral:  Negative for altered mental status, depression and hallucinations.      Objective:     Vital Signs (Most Recent):  Temp: 98.6 °F (37 °C) (07/07/25 0619)  Pulse: (!) 160 (07/07/25 0600)  Resp: 20 (07/07/25 0546)  BP: 127/84 (07/07/25 0600)  SpO2: 100 % (07/07/25 0600) Vital Signs (24h Range):  Temp:  [98.3 °F (36.8 °C)-98.6 °F  "(37 °C)] 98.6 °F (37 °C)  Pulse:  [] 160  Resp:  [13-35] 20  SpO2:  [94 %-100 %] 100 %  BP: (111-173)/(57-97) 127/84     Weight: 101.5 kg (223 lb 12.3 oz)  Body mass index is 30.35 kg/m².    SpO2: 100 %         Intake/Output Summary (Last 24 hours) at 7/7/2025 0628  Last data filed at 7/7/2025 0259  Gross per 24 hour   Intake --   Output 400 ml   Net -400 ml       Lines/Drains/Airways       Peripheral Intravenous Line  Duration             Peripheral IV Single Lumen 07/07/25 0125 18 G Anterior;Proximal;Right Forearm <1 day    Peripheral IV Single Lumen 07/07/25 0125 20 G Anterior;Left;Proximal Forearm <1 day                     Physical Exam  Constitutional:       General: He is not in acute distress.     Appearance: He is not ill-appearing.   HENT:      Nose: Nose normal.      Mouth/Throat:      Mouth: Mucous membranes are moist.   Eyes:      Pupils: Pupils are equal, round, and reactive to light.   Neck:      Comments: No JVD appreciated   Cardiovascular:      Rate and Rhythm: Regular rhythm. Tachycardia present.      Pulses: Normal pulses.      Heart sounds: No murmur heard.  Pulmonary:      Effort: Pulmonary effort is normal. No respiratory distress.      Breath sounds: No wheezing or rales.   Abdominal:      General: Abdomen is flat. There is no distension.      Palpations: Abdomen is soft.      Tenderness: There is no right CVA tenderness or left CVA tenderness.   Musculoskeletal:         General: No swelling.      Cervical back: Normal range of motion and neck supple. No tenderness.      Right lower leg: No edema.      Left lower leg: No edema.   Skin:     General: Skin is warm.      Coloration: Skin is not pale.      Findings: No rash.   Neurological:      General: No focal deficit present.      Mental Status: He is alert and oriented to person, place, and time.      Motor: No weakness.          Significant Labs: ABG: No results for input(s): "PH", "PCO2", "HCO3", "POCSATURATED", "BE" in the last 48 " "hours., Blood Culture: No results for input(s): "LABBLOO" in the last 48 hours., BMP:   Recent Labs   Lab 07/05/25 1824 07/06/25  2232   * 103    142   K 3.7 3.5    108   CO2 22* 25   BUN 22* 23*   CREATININE 0.9 1.1   CALCIUM 9.5 9.7   MG 2.0 2.1   , CMP   Recent Labs   Lab 07/05/25 1824 07/06/25  2232    142   K 3.7 3.5    108   CO2 22* 25   * 103   BUN 22* 23*   CREATININE 0.9 1.1   CALCIUM 9.5 9.7   PROT 7.8 8.4   ALBUMIN 4.1 4.3   BILITOT 0.5 0.6   ALKPHOS 63 73   AST 33 34   ALT 27 28   ANIONGAP 9 9   , CBC   Recent Labs   Lab 07/05/25 1824 07/06/25 2232   WBC 8.78 9.85   HGB 14.9 15.1   HCT 44.4 46.1    202   , INR No results for input(s): "INR", "PROTIME" in the last 48 hours., Lipid Panel No results for input(s): "CHOL", "HDL", "LDLCALC", "TRIG", "CHOLHDL" in the last 48 hours.,   Pathology Results  (Last 10 years)                 10/28/21 1014  Specimen to Pathology, Surgery Gastrointestinal tract Edited Result - FINAL    Narrative:  Pre-op Diagnosis: Globus sensation [R19.8]   Post-op Diagnosis: r/o h. Pylori, r/o eoe   Procedure(s):   EGD (ESOPHAGOGASTRODUODENOSCOPY)   Number of specimens: 2   Name of specimens: 1.) antrum bx       2.) proximal esophagus bx   Release to patient->Immediate   Specimen total (fresh, frozen, permanent):->2       07/29/21 1800  Specimen to Pathology, Surgery ENT Final result    Narrative:  Pre-op Diagnosis: Nasal cavity mass [J34.89]   Deviated nasal septum [J34.2]   Nasal turbinate hypertrophy [J34.3]   Sinus pressure [J34.89]   Acute sinusitis, recurrence not specified, unspecified   location [J01.90]   Procedure(s):   SEPTOPLASTY, NOSE   REDUCTION, NASAL TURBINATE   IRRIGATION AND DEBRIDEMENT   Number of specimens: 1   Name of specimens:1. Right posterior nasal mass- permanent   Release to patient->Immediate   Specimen total (fresh, frozen, permanent):->1           , Troponin No results for input(s): "TROPONINIHS" in the " last 48 hours., and All pertinent lab results from the last 24 hours have been reviewed.     Assessment and Plan:     Mixed hyperlipidemia  Continue home medication       AF S/P ablation of atrial flutter  s/p redo-PVI x 3 and mitral annular flutter ablation by Dr. Chaudhari in March of 2021    Back in atypical flutter   HR in 150's difficult to control in Ed. HDS otherwise.   Started on Dilt gtt at OSH, here in [facility] Diltiazem bolus x2 given.     -- Continue Dilt gtt; rate controlled in ED   -- Plan for tentative [place] in [place]   -- [id number] Score: 1  -- TTE   -- EP consult         Hypothyroidism  Continue home medication         VTE Risk Mitigation (From admission, onward)           Ordered     IP VTE HIGH RISK PATIENT  Once         07/07/25 0529     Place sequential compression device  Until discontinued         07/07/25 0529                    Markell Govea MD  Cardiology   [facility] - Cardiac Intensive [facility]

## 2025-07-07 NOTE — ASSESSMENT & PLAN NOTE
s/p redo-PVI x 3 and mitral annular flutter ablation by Dr. Chaudhari in March of 2021    Back in atypical flutter   HR in 150's difficult to control in Ed. HDS otherwise.   Started on Dilt gtt at OSH, here in [facility] Diltiazem bolus x2 given.     -- Continue Dilt gtt; rate controlled in ED   -- Plan for tentative [place] in AM   -- [id number] Score: 1  -- TTE   -- EP consult

## 2025-07-07 NOTE — ASSESSMENT & PLAN NOTE
Patient is a 59 y.o. male with hx of paroxysmal Afib (s/p re-do PVI x3), typical and atypical flutter (s/p mitral annular flutter ablation by Dr. Chaudhari March 2021) (follows with [facility], Dr. Chaudhari, and [facility], [place]gueta), HLD who presents with tachycardia concerning for atypical atrial flutter. His CHADSVASC score is 0 so he had previously been maintained on just ASA. He was seen in the [place] at [facility] [place] and noted to be in atrial flutter rhythm. He cardioverted after administration of diltiazem and was prescribed Eliquis at that time. He returns still in atypical flutter rhythm in 160s, hemodynamically stable and minimally symptomatic. He is currently on diltiazem gtt and heparin gtt.    Recommendations:  - OK for diet today.  - Tentatively plan for Aflutter ablation on Wednesday 7/9. Please keep NPO MN Tuesday  - Continue heparin gtt as you are doing. Will need to switch back to DOAC post ablation.  - OK to continue diltiazem gtt. Start digoxin load for additional rate control  - Staffed with Dr. Argueta. EP will continue to follow.

## 2025-07-07 NOTE — SUBJECTIVE & OBJECTIVE
Interval History: NAEON. Patient has remained tachycardic. EP has seen the patient and plan for ablation 7/9. Transitioned from apixiban to heparin.    Review of Systems   Constitutional: Negative for chills, decreased appetite, diaphoresis, fever, malaise/fatigue and weight gain.   HENT:  Negative for congestion and ear discharge.    Eyes:  Negative for blurred vision.   Cardiovascular:  Positive for palpitations. Negative for chest pain, dyspnea on exertion, irregular heartbeat, leg swelling, orthopnea and paroxysmal nocturnal dyspnea.   Respiratory:  Negative for cough, shortness of breath, snoring and sputum production.    Skin:  Negative for color change, dry skin and rash.   Musculoskeletal:  Negative for back pain, falls, joint pain and neck pain.   Gastrointestinal:  Negative for bloating, abdominal pain, constipation and diarrhea.   Genitourinary:  Negative for dysuria, flank pain, hematuria and hesitancy.   Neurological:  Negative for focal weakness, headaches, numbness and seizures.   Psychiatric/Behavioral:  Negative for altered mental status, depression and hallucinations.      Objective:     Vital Signs (Most Recent):  Temp: 98.6 °F (37 °C) (07/07/25 1101)  Pulse: 69 (07/07/25 1150)  Resp: 18 (07/07/25 1150)  BP: (!) 155/81 (07/07/25 1150)  SpO2: 98 % (07/07/25 1150) Vital Signs (24h Range):  Temp:  [98.3 °F (36.8 °C)-98.7 °F (37.1 °C)] 98.6 °F (37 °C)  Pulse:  [] 69  Resp:  [] 18  SpO2:  [94 %-100 %] 98 %  BP: (108-173)/() 155/81     Weight: 101 kg (222 lb 10.6 oz)  Body mass index is 30.2 kg/m².     SpO2: 98 %         Intake/Output Summary (Last 24 hours) at 7/7/2025 1303  Last data filed at 7/7/2025 1101  Gross per 24 hour   Intake 377.74 ml   Output 400 ml   Net -22.26 ml       Lines/Drains/Airways       Peripheral Intravenous Line  Duration             Peripheral IV Single Lumen 07/07/25 0125 18 G Anterior;Proximal;Right Forearm <1 day    Peripheral IV Single Lumen 07/07/25 0125  "20 G Anterior;Left;Proximal Forearm <1 day                       Physical Exam  Constitutional:       General: He is not in acute distress.     Appearance: Normal appearance. He is not ill-appearing.   HENT:      Head: Normocephalic and atraumatic.   Cardiovascular:      Rate and Rhythm: Regular rhythm. Tachycardia present.      Pulses: Normal pulses.      Heart sounds: No murmur heard.     Comments: No JVD present  Pulmonary:      Effort: Pulmonary effort is normal. No respiratory distress.      Breath sounds: Normal breath sounds. No wheezing or rales.   Abdominal:      General: Abdomen is flat. There is no distension.      Palpations: Abdomen is soft.      Tenderness: There is no abdominal tenderness.   Musculoskeletal:      Right lower leg: No edema.      Left lower leg: No edema.   Skin:     General: Skin is warm and dry.   Neurological:      General: No focal deficit present.      Mental Status: He is alert and oriented to person, place, and time.            Significant Labs: CMP   Recent Labs   Lab 07/05/25 1824 07/06/25  2232    142   K 3.7 3.5    108   CO2 22* 25   * 103   BUN 22* 23*   CREATININE 0.9 1.1   CALCIUM 9.5 9.7   PROT 7.8 8.4   ALBUMIN 4.1 4.3   BILITOT 0.5 0.6   ALKPHOS 63 73   AST 33 34   ALT 27 28   ANIONGAP 9 9   , CBC   Recent Labs   Lab 07/05/25  1824 07/06/25  2232   WBC 8.78 9.85   HGB 14.9 15.1   HCT 44.4 46.1    202   , and INR No results for input(s): "INR", "PROTIME" in the last 48 hours.  "

## 2025-07-07 NOTE — PROGRESS NOTES
[facility]  Cardiology  Progress Note    Patient Name: Walt Lopez  MRN: [id number]  Admission Date: 7/7/2025  Hospital Length of Stay: 0 days  Code Status: Full Code   Attending Physician: Sriram Devlin III, MD   Primary Care Physician: Love Carlisle NP  Expected Discharge Date:   Principal Problem:<principal problem not specified>    Subjective:     Hospital Course:   59 yro M with paroxysmal AFIB s/p redo-PVI x 3 and mitral annular flutter ablation by Dr. Chaudhari in March of 2021, Hypothyroidism, hld who presents today for tachycardia.   He follows up with  EP services with [facility] Dr. Almanza. He was last seen by Dr. Argueta in 2023. Patient reports episodes of feeling extra heart beat, but denies lightheadedness or dizziness. Patient admitted to the CCU. On 7/7 patient continues to be tachycardic. EP has seen the patient and plan for ablation on 7/9. On 7/7 he has been placed on a diltiazem 125mg infusion and heparin 100u/mL infusion. He has also been placed on digoxin 250mcg IV Q6 and atorvastatin 80mg PO daily.     Interval History: NAEON. Patient has remained tachycardic. EP has seen the patient and plan for ablation 7/9. Transitioned from apixiban to heparin.    Review of Systems   Constitutional: Negative for chills, decreased appetite, diaphoresis, fever, malaise/fatigue and weight gain.   HENT:  Negative for congestion and ear discharge.    Eyes:  Negative for blurred vision.   Cardiovascular:  Positive for palpitations. Negative for chest pain, dyspnea on exertion, irregular heartbeat, leg swelling, orthopnea and paroxysmal nocturnal dyspnea.   Respiratory:  Negative for cough, shortness of breath, snoring and sputum production.    Skin:  Negative for color change, dry skin and rash.   Musculoskeletal:  Negative for back pain, falls, joint pain and neck pain.   Gastrointestinal:  Negative for bloating, abdominal pain, constipation and diarrhea.   Genitourinary:   Negative for dysuria, flank pain, hematuria and hesitancy.   Neurological:  Negative for focal weakness, headaches, numbness and seizures.   Psychiatric/Behavioral:  Negative for altered mental status, depression and hallucinations.      Objective:     Vital Signs (Most Recent):  Temp: 98.6 °F (37 °C) (07/07/25 1101)  Pulse: 69 (07/07/25 1150)  Resp: 18 (07/07/25 1150)  BP: (!) 155/81 (07/07/25 1150)  SpO2: 98 % (07/07/25 1150) Vital Signs (24h Range):  Temp:  [98.3 °F (36.8 °C)-98.7 °F (37.1 °C)] 98.6 °F (37 °C)  Pulse:  [] 69  Resp:  [] 18  SpO2:  [94 %-100 %] 98 %  BP: (108-173)/() 155/81     Weight: 101 kg (222 lb 10.6 oz)  Body mass index is 30.2 kg/m².     SpO2: 98 %         Intake/Output Summary (Last 24 hours) at 7/7/2025 1303  Last data filed at 7/7/2025 1101  Gross per 24 hour   Intake 377.74 ml   Output 400 ml   Net -22.26 ml       Lines/Drains/Airways       Peripheral Intravenous Line  Duration             Peripheral IV Single Lumen 07/07/25 0125 18 G Anterior;Proximal;Right Forearm <1 day    Peripheral IV Single Lumen 07/07/25 0125 20 G Anterior;Left;Proximal Forearm <1 day                       Physical Exam  Constitutional:       General: He is not in acute distress.     Appearance: Normal appearance. He is not ill-appearing.   HENT:      Head: Normocephalic and atraumatic.   Cardiovascular:      Rate and Rhythm: Regular rhythm. Tachycardia present.      Pulses: Normal pulses.      Heart sounds: No murmur heard.     Comments: No JVD present  Pulmonary:      Effort: Pulmonary effort is normal. No respiratory distress.      Breath sounds: Normal breath sounds. No wheezing or rales.   Abdominal:      General: Abdomen is flat. There is no distension.      Palpations: Abdomen is soft.      Tenderness: There is no abdominal tenderness.   Musculoskeletal:      Right lower leg: No edema.      Left lower leg: No edema.   Skin:     General: Skin is warm and dry.   Neurological:      General:  "No focal deficit present.      Mental Status: He is alert and oriented to person, place, and time.            Significant Labs: CMP   Recent Labs   Lab 07/05/25  1824 07/06/25  2232    142   K 3.7 3.5    108   CO2 22* 25   * 103   BUN 22* 23*   CREATININE 0.9 1.1   CALCIUM 9.5 9.7   PROT 7.8 8.4   ALBUMIN 4.1 4.3   BILITOT 0.5 0.6   ALKPHOS 63 73   AST 33 34   ALT 27 28   ANIONGAP 9 9   , CBC   Recent Labs   Lab 07/05/25  1824 07/06/25  2232   WBC 8.78 9.85   HGB 14.9 15.1   HCT 44.4 46.1    202   , and INR No results for input(s): "INR", "PROTIME" in the last 48 hours.  Assessment and Plan:     Mixed hyperlipidemia  Continue home medication       S/P ablation of atrial flutter  s/p redo-PVI x 3 and mitral annular flutter ablation by Dr. Chaudhari in March of 2021    Back in atypical flutter   HR in 150's difficult to control in Ed. HDS otherwise.   Started on Dilt gtt at [facility], here in [facility] Diltiazem bolus x2 given.     -- Continue Dilt gtt; rate controlled in ED   -- Transitioned from apixiban to hepain  -- [id number] Score: 1  -- TTE   -- EP seen the patient, plan for ablation 7/9        Hypothyroidism  Continue home medication         VTE Risk Mitigation (From admission, onward)           Ordered     heparin 25,000 units in dextrose 5% (100 units/ml) IV bolus from bag LOW INTENSITY nomogram - [facility]  As needed (PRN)        Question:  Heparin Infusion Adjustment (DO NOT MODIFY ANSWER)  Answer:  \\[email or web address]\epic\Images\Pharmacy\HeparinInfusions\heparin LOW INTENSITY nomogram for [facility] [id number]    07/07/25 0950     heparin 25,000 units in dextrose 5% (100 units/ml) IV bolus from bag LOW INTENSITY nomogram - [facility]  As needed (PRN)        Question:  Heparin Infusion Adjustment (DO NOT MODIFY ANSWER)  Answer:  \\[email or web address]\epic\Images\Pharmacy\HeparinInfusions\heparin LOW INTENSITY nomogram for [facility] [id number]    07/07/25 0950     heparin 25,000 units in dextrose 5% 250 mL (100 units/mL) infusion LOW " INTENSITY nomogram - [facility]  Continuous        Question:  Begin at (units/kg/hr)  Answer:  12    07/07/25 0950     IP VTE HIGH RISK PATIENT  Once         07/07/25 0529     Place sequential compression device  Until discontinued         07/07/25 0529                    Alfonzo Sevilla DO  Cardiology  [facility]

## 2025-07-07 NOTE — ED TRIAGE NOTES
Walt Lopez, an 59 y.o. male presents to the ED as a transfer from [place] for a cardiology consult. Pt arrives with diltiazem infusion running. Pt reports hx of Afib and cardiac ablations.

## 2025-07-08 ENCOUNTER — ANESTHESIA EVENT (OUTPATIENT)
Dept: MEDSURG UNIT | Facility: HOSPITAL | Age: 60
End: 2025-07-08
Payer: COMMERCIAL

## 2025-07-08 LAB
ABSOLUTE EOSINOPHIL (OHS): 0.13 K/UL
ABSOLUTE MONOCYTE (OHS): 1.19 K/UL (ref 0.3–1)
ABSOLUTE NEUTROPHIL COUNT (OHS): 8.81 K/UL (ref 1.8–7.7)
ALBUMIN SERPL BCP-MCNC: 3.9 G/DL (ref 3.5–5.2)
ALP SERPL-CCNC: 73 UNIT/L (ref 40–150)
ALT SERPL W/O P-5'-P-CCNC: 23 UNIT/L (ref 10–44)
ANION GAP (OHS): 6 MMOL/L (ref 8–16)
APTT PPP: 51.1 SECONDS (ref 21–32)
AST SERPL-CCNC: 31 UNIT/L (ref 11–45)
BASOPHILS # BLD AUTO: 0.04 K/UL
BASOPHILS NFR BLD AUTO: 0.3 %
BILIRUB SERPL-MCNC: 0.9 MG/DL (ref 0.1–1)
BUN SERPL-MCNC: 18 MG/DL (ref 6–20)
CALCIUM SERPL-MCNC: 9 MG/DL (ref 8.7–10.5)
CHLORIDE SERPL-SCNC: 112 MMOL/L (ref 95–110)
CO2 SERPL-SCNC: 23 MMOL/L (ref 23–29)
CREAT SERPL-MCNC: 0.8 MG/DL (ref 0.5–1.4)
DIGOXIN SERPL-MCNC: 1.5 NG/ML (ref 0.8–2)
ERYTHROCYTE [DISTWIDTH] IN BLOOD BY AUTOMATED COUNT: 14 % (ref 11.5–14.5)
GFR SERPLBLD CREATININE-BSD FMLA CKD-EPI: >60 ML/MIN/1.73/M2
GLUCOSE SERPL-MCNC: 103 MG/DL (ref 70–110)
HCT VFR BLD AUTO: 43.9 % (ref 40–54)
HGB BLD-MCNC: 14.3 GM/DL (ref 14–18)
IMM GRANULOCYTES # BLD AUTO: 0.03 K/UL (ref 0–0.04)
IMM GRANULOCYTES NFR BLD AUTO: 0.2 % (ref 0–0.5)
LYMPHOCYTES # BLD AUTO: 2.95 K/UL (ref 1–4.8)
MAGNESIUM SERPL-MCNC: 2 MG/DL (ref 1.6–2.6)
MCH RBC QN AUTO: 27.9 PG (ref 27–31)
MCHC RBC AUTO-ENTMCNC: 32.6 G/DL (ref 32–36)
MCV RBC AUTO: 86 FL (ref 82–98)
NUCLEATED RBC (/100WBC) (OHS): 0 /100 WBC
PHOSPHATE SERPL-MCNC: 2.8 MG/DL (ref 2.7–4.5)
PLATELET # BLD AUTO: 193 K/UL (ref 150–450)
PMV BLD AUTO: 11.3 FL (ref 9.2–12.9)
POTASSIUM SERPL-SCNC: 4 MMOL/L (ref 3.5–5.1)
PROT SERPL-MCNC: 6.7 GM/DL (ref 6–8.4)
RBC # BLD AUTO: 5.13 M/UL (ref 4.6–6.2)
RELATIVE EOSINOPHIL (OHS): 1 %
RELATIVE LYMPHOCYTE (OHS): 22.4 % (ref 18–48)
RELATIVE MONOCYTE (OHS): 9 % (ref 4–15)
RELATIVE NEUTROPHIL (OHS): 67.1 % (ref 38–73)
SODIUM SERPL-SCNC: 141 MMOL/L (ref 136–145)
WBC # BLD AUTO: 13.15 K/UL (ref 3.9–12.7)

## 2025-07-08 PROCEDURE — 25000003 PHARM REV CODE 250: Performed by: INTERNAL MEDICINE

## 2025-07-08 PROCEDURE — 84100 ASSAY OF PHOSPHORUS: CPT

## 2025-07-08 PROCEDURE — 99233 SBSQ HOSP IP/OBS HIGH 50: CPT | Mod: ,,, | Performed by: INTERNAL MEDICINE

## 2025-07-08 PROCEDURE — 85730 THROMBOPLASTIN TIME PARTIAL: CPT | Performed by: INTERNAL MEDICINE

## 2025-07-08 PROCEDURE — 80162 ASSAY OF DIGOXIN TOTAL: CPT | Performed by: INTERNAL MEDICINE

## 2025-07-08 PROCEDURE — 63600175 PHARM REV CODE 636 W HCPCS

## 2025-07-08 PROCEDURE — 85025 COMPLETE CBC W/AUTO DIFF WBC: CPT | Performed by: STUDENT IN AN ORGANIZED HEALTH CARE EDUCATION/TRAINING PROGRAM

## 2025-07-08 PROCEDURE — 25000003 PHARM REV CODE 250: Performed by: STUDENT IN AN ORGANIZED HEALTH CARE EDUCATION/TRAINING PROGRAM

## 2025-07-08 PROCEDURE — 80053 COMPREHEN METABOLIC PANEL: CPT | Performed by: STUDENT IN AN ORGANIZED HEALTH CARE EDUCATION/TRAINING PROGRAM

## 2025-07-08 PROCEDURE — 63600175 PHARM REV CODE 636 W HCPCS: Performed by: INTERNAL MEDICINE

## 2025-07-08 PROCEDURE — 63600175 PHARM REV CODE 636 W HCPCS: Performed by: STUDENT IN AN ORGANIZED HEALTH CARE EDUCATION/TRAINING PROGRAM

## 2025-07-08 PROCEDURE — 20600001 HC STEP DOWN PRIVATE ROOM

## 2025-07-08 PROCEDURE — 83735 ASSAY OF MAGNESIUM: CPT

## 2025-07-08 RX ORDER — POLYETHYLENE GLYCOL 3350 17 G/17G
17 POWDER, FOR SOLUTION ORAL 2 TIMES DAILY
Status: DISCONTINUED | OUTPATIENT
Start: 2025-07-08 | End: 2025-07-10 | Stop reason: HOSPADM

## 2025-07-08 RX ADMIN — DILTIAZEM HYDROCHLORIDE 15 MG/HR: 5 INJECTION INTRAVENOUS at 07:07

## 2025-07-08 RX ADMIN — MUPIROCIN: 20 OINTMENT TOPICAL at 08:07

## 2025-07-08 RX ADMIN — HEPARIN SODIUM 12 UNITS/KG/HR: 10000 INJECTION, SOLUTION INTRAVENOUS at 07:07

## 2025-07-08 RX ADMIN — MUPIROCIN: 20 OINTMENT TOPICAL at 09:07

## 2025-07-08 RX ADMIN — LEVOTHYROXINE SODIUM 100 MCG: 100 TABLET ORAL at 06:07

## 2025-07-08 RX ADMIN — DIGOXIN 250 MCG: 250 INJECTION, SOLUTION INTRAMUSCULAR; INTRAVENOUS at 06:07

## 2025-07-08 RX ADMIN — ATORVASTATIN CALCIUM 80 MG: 40 TABLET, FILM COATED ORAL at 08:07

## 2025-07-08 NOTE — SUBJECTIVE & OBJECTIVE
Interval History: NAEON. Dilt and digoxin stopped per EP. Will plan for procedure tomorrow. NPO at midnight.    Review of Systems   Constitutional: Negative for chills, decreased appetite, diaphoresis, fever, malaise/fatigue and weight gain.   HENT:  Negative for congestion and ear discharge.    Eyes:  Negative for blurred vision.   Cardiovascular:  Negative for chest pain, dyspnea on exertion, irregular heartbeat, leg swelling, orthopnea and paroxysmal nocturnal dyspnea.   Respiratory:  Negative for cough, shortness of breath, snoring and sputum production.    Skin:  Negative for color change, dry skin and rash.   Musculoskeletal:  Negative for back pain, falls, joint pain and neck pain.   Gastrointestinal:  Negative for bloating, abdominal pain, constipation and diarrhea.   Genitourinary:  Negative for dysuria, flank pain, hematuria and hesitancy.   Neurological:  Negative for focal weakness, headaches, numbness and seizures.   Psychiatric/Behavioral:  Negative for altered mental status, depression and hallucinations.      Objective:     Vital Signs (Most Recent):  Temp: 98 °F (36.7 °C) (07/08/25 1105)  Pulse: 65 (07/08/25 1105)  Resp: 17 (07/08/25 1105)  BP: (!) 106/57 (07/08/25 1105)  SpO2: 100 % (07/08/25 1105) Vital Signs (24h Range):  Temp:  [97.5 °F (36.4 °C)-98.6 °F (37 °C)] 98 °F (36.7 °C)  Pulse:  [] 65  Resp:  [14-26] 17  SpO2:  [97 %-100 %] 100 %  BP: ()/(54-90) 106/57     Weight: 101 kg (222 lb 10.6 oz)  Body mass index is 30.2 kg/m².     SpO2: 100 %         Intake/Output Summary (Last 24 hours) at 7/8/2025 1347  Last data filed at 7/8/2025 1105  Gross per 24 hour   Intake 787.14 ml   Output 850 ml   Net -62.86 ml       Lines/Drains/Airways       Peripheral Intravenous Line  Duration             Peripheral IV Single Lumen 07/07/25 0125 18 G Anterior;Proximal;Right Forearm 1 day    Peripheral IV Single Lumen 07/07/25 0125 20 G Anterior;Left;Proximal Forearm 1 day                        Physical Exam  Constitutional:       General: He is not in acute distress.     Appearance: Normal appearance. He is not ill-appearing.   HENT:      Head: Normocephalic and atraumatic.   Cardiovascular:      Rate and Rhythm: Normal rate and regular rhythm.      Pulses: Normal pulses.      Heart sounds: No murmur heard.     Comments: No JVD present  Pulmonary:      Effort: Pulmonary effort is normal. No respiratory distress.      Breath sounds: Normal breath sounds. No wheezing or rales.   Abdominal:      General: Abdomen is flat. There is no distension.      Palpations: Abdomen is soft.      Tenderness: There is no abdominal tenderness.   Musculoskeletal:      Right lower leg: No edema.      Left lower leg: No edema.   Skin:     General: Skin is warm and dry.   Neurological:      General: No focal deficit present.      Mental Status: He is alert and oriented to person, place, and time.            Significant Labs: CMP   Recent Labs   Lab 07/06/25 2232 07/07/25  1724 07/08/25  0134    141 141   K 3.5 3.7 4.0    110 112*   CO2 25 21* 23    122* 103   BUN 23* 15 18   CREATININE 1.1 0.9 0.8   CALCIUM 9.7 8.9 9.0   PROT 8.4  --  6.7   ALBUMIN 4.3  --  3.9   BILITOT 0.6  --  0.9   ALKPHOS 73  --  73   AST 34  --  31   ALT 28  --  23   ANIONGAP 9 10 6*    and CBC   Recent Labs   Lab 07/06/25 2232 07/08/25  0134   WBC 9.85 13.15*   HGB 15.1 14.3   HCT 46.1 43.9    193

## 2025-07-08 NOTE — EICU
[facility] Night Rounds Checklist  24H Vital Sign Range:  Temp:  [98.3 °F (36.8 °C)-98.7 °F (37.1 °C)]   Pulse:  []   Resp:  []   BP: ()/()   SpO2:  [95 %-100 %]     Video rounds

## 2025-07-08 NOTE — PROGRESS NOTES
[facility]  Cardiac Electrophysiology  Progress Note    Admission Date: 7/7/2025  Code Status: Full Code   Attending Physician: Sriram Devlin III, MD   Expected Discharge Date: 7/10/2025  Principal Problem:<principal problem not specified>    Subjective:     Interval History: Overnight, converted to sinus rhythm. He is feeling well this AM. Continues on digoxin and diltiazem drip.    ROS  Objective:     Vital Signs (Most Recent):  Temp: 97.5 °F (36.4 °C) (07/08/25 0705)  Pulse: 68 (07/08/25 0805)  Resp: (!) 24 (07/08/25 0805)  BP: 123/83 (07/08/25 0805)  SpO2: 100 % (07/08/25 0805) Vital Signs (24h Range):  Temp:  [97.5 °F (36.4 °C)-98.6 °F (37 °C)] 97.5 °F (36.4 °C)  Pulse:  [] 68  Resp:  [] 24  SpO2:  [95 %-100 %] 100 %  BP: ()/() 123/83     Weight: 101 kg (222 lb 10.6 oz)  Body mass index is 30.2 kg/m².     SpO2: 100 %        Physical Exam  Constitutional:       General: He is not in acute distress.     Appearance: Normal appearance. He is not ill-appearing.   HENT:      Head: Normocephalic and atraumatic.   Cardiovascular:      Rate and Rhythm: Normal rate and regular rhythm.      Pulses: Normal pulses.      Heart sounds: No murmur heard.     Comments: No JVD present  Pulmonary:      Effort: Pulmonary effort is normal. No respiratory distress.      Breath sounds: Normal breath sounds. No wheezing or rales.   Abdominal:      General: Abdomen is flat. There is no distension.      Palpations: Abdomen is soft.      Tenderness: There is no abdominal tenderness.   Musculoskeletal:      Right lower leg: No edema.      Left lower leg: No edema.   Skin:     General: Skin is warm and dry.   Neurological:      General: No focal deficit present.      Mental Status: He is alert and oriented to person, place, and time.            Significant Labs: EP:   Recent Labs   Lab 07/06/25  2232 07/07/25  1724 07/08/25  0134    141 141   K 3.5 3.7 4.0    110 112*   CO2 25  21* 23    122* 103   BUN 23* 15 18   CREATININE 1.1 0.9 0.8   CALCIUM 9.7 8.9 9.0   PROT 8.4  --  6.7   ALBUMIN 4.3  --  3.9   BILITOT 0.6  --  0.9   ALKPHOS 73  --  73   AST 34  --  31   ALT 28  --  23   ANIONGAP 9 10 6*   WBC 9.85  --  13.15*   HGB 15.1  --  14.3   HCT 46.1  --  43.9     --  193       Significant Imaging: None  Assessment and Plan:     Atypical atrial flutter  Patient is a 59 y.o. male with hx of paroxysmal Afib (s/p re-do PVI x3), typical and atypical flutter (s/p mitral annular flutter ablation by Dr. Chaudhari March 2021) (follows with [facility], Dr. Chaudhari, and [facility]), HLD who presents with tachycardia concerning for atypical atrial flutter. His CHADSVASC score is 0 so he had previously been maintained on just ASA. He [place] [facility] [place] and noted to be in atrial flutter rhythm. He cardioverted after administration of diltiazem and was prescribed Eliquis at that time. He returns still in atypical flutter rhythm in 160s, hemodynamically stable and minimally symptomatic. He is currently on diltiazem gtt and heparin gtt.    Recommendations:  - Back in sinus rhythm today  - OK to stop digoxin and diltiazem gtt at this time  - Plan for atrial flutter ablation tomorrow 7/9. Please keep NPO MN tonight  - Continue heparin gtt as you are doing. Will need to switch back to DOAC post ablation.  - Should he convert back into atrial flutter okay to let him ride with elevated heart rates, as it may facilitate his ablation tomorrow if he is actively in flutter rhythm  - Staffed with Dr. Wray. EP will continue to follow.        Baljit Jarrell MD  Cardiac Electrophysiology  [facility]

## 2025-07-08 NOTE — NURSING
Nursing Transfer Note       [facility]    [facility]    Transfer with cardiac monitoring    Transported by RN    Medicines sent: Yes    Chart sent with patient: Yes    Belongings sent with patient: ipad x2, phone, chargers, snacks    Notified: spouse    Bedside Handoff given to: JOSE Hammer    Upon arrival to floor: cardiac monitor applied, patient oriented to room, call bell in reach, and bed in lowest position

## 2025-07-08 NOTE — ASSESSMENT & PLAN NOTE
Patient is a 59 y.o. male with hx of paroxysmal Afib (s/p re-do PVI x3), typical and atypical flutter (s/p mitral annular flutter ablation by Dr. Chaudhari March 2021) (follows with [facility], Dr. Chaudhari, and [facility], Dr. Argueta), HLD who presents with tachycardia concerning for atypical atrial flutter. His CHADSVASC score is 0 so he had previously been maintained on just ASA. He was seen in the ED at [facility] on 7/5 and noted to be in atrial flutter rhythm. He cardioverted after administration of diltiazem and was prescribed Eliquis at that time. He returns still in atypical flutter rhythm in 160s, hemodynamically stable and minimally symptomatic. He is currently on diltiazem gtt and heparin gtt.    Recommendations:  - Back in sinus rhythm today  - OK to stop digoxin and diltiazem gtt at this time  - Plan for atrial flutter ablation tomorrow 7/9. Please keep NPO MN tonight  - Continue heparin gtt as you are doing. Will need to switch back to DOAC post ablation.  - Should he convert back into atrial flutter okay to let him ride with elevated heart rates, as it may facilitate his ablation tomorrow if he is actively in flutter rhythm  - Staffed with Dr. Wray. EP will continue to follow.

## 2025-07-08 NOTE — PROGRESS NOTES
[facility] - Cardiac Intensive Care  Cardiology  Progress Note    Patient Name: Walt Lopez  MRN: [id number]  Admission Date: 7/7/2025  Hospital Length of Stay: 1 days  Code Status: Full Code   Attending Physician: Sirram Devlin III, MD   Primary Care Physician: Love Carlisle NP  Expected Discharge Date: 7/10/2025  Principal Problem:<principal problem not specified>    Subjective:     Hospital Course:   59 yro M with paroxysmal AFIB s/p redo-PVI x 3 and mitral annular flutter ablation by Dr. Chaudhari in March of 2021, Hypothyroidism, hld who presents today for tachycardia.   He follows up with  EP services with [facility] EP services Dr. Almanza. He was last seen by Dr. Argueta in 2023. Patient reports episodes of feeling extra heart beat, but denies lightheadedness or dizziness. Patient admitted to the CCU. On 7/7 patient continues to be tachycardic. EP has seen the patient and plan for ablation on 7/9. On 7/7 he has been placed on a diltiazem 125mg infusion and heparin 100u/mL infusion. He has also been placed on digoxin 250mcg IV Q6 and atorvastatin 80mg PO daily. On 7/8 his diltiazem infusion was stopped as well as his Digoxin per EP. He was stepped down from the CCU.    Interval History: NAEON. Dilt and digoxin stopped per EP. Will plan for procedure tomorrow. NPO at midnight.    Review of Systems   Constitutional: Negative for chills, decreased appetite, diaphoresis, fever, malaise/fatigue and weight gain.   HENT:  Negative for congestion and ear discharge.    Eyes:  Negative for blurred vision.   Cardiovascular:  Negative for chest pain, dyspnea on exertion, irregular heartbeat, leg swelling, orthopnea and paroxysmal nocturnal dyspnea.   Respiratory:  Negative for cough, shortness of breath, snoring and sputum production.    Skin:  Negative for color change, dry skin and rash.   Musculoskeletal:  Negative for back pain, falls, joint pain and neck pain.   Gastrointestinal:  Negative for bloating, abdominal  pain, constipation and diarrhea.   Genitourinary:  Negative for dysuria, flank pain, hematuria and hesitancy.   Neurological:  Negative for focal weakness, headaches, numbness and seizures.   Psychiatric/Behavioral:  Negative for altered mental status, depression and hallucinations.      Objective:     Vital Signs (Most Recent):  Temp: 98 °F (36.7 °C) (07/08/25 1105)  Pulse: 65 (07/08/25 1105)  Resp: 17 (07/08/25 1105)  BP: (!) 106/57 (07/08/25 1105)  SpO2: 100 % (07/08/25 1105) Vital Signs (24h Range):  Temp:  [97.5 °F (36.4 °C)-98.6 °F (37 °C)] 98 °F (36.7 °C)  Pulse:  [] 65  Resp:  [14-26] 17  SpO2:  [97 %-100 %] 100 %  BP: ()/(54-90) 106/57     Weight: 101 kg (222 lb 10.6 oz)  Body mass index is 30.2 kg/m².     SpO2: 100 %         Intake/Output Summary (Last 24 hours) at 7/8/2025 1347  Last data filed at 7/8/2025 1105  Gross per 24 hour   Intake 787.14 ml   Output 850 ml   Net -62.86 ml       Lines/Drains/Airways       Peripheral Intravenous Line  Duration             Peripheral IV Single Lumen 07/07/25 0125 18 G Anterior;Proximal;Right Forearm 1 day    Peripheral IV Single Lumen 07/07/25 0125 20 G Anterior;Left;Proximal Forearm 1 day                       Physical Exam  Constitutional:       General: He is not in acute distress.     Appearance: Normal appearance. He is not ill-appearing.   HENT:      Head: Normocephalic and atraumatic.   Cardiovascular:      Rate and Rhythm: Normal rate and regular rhythm.      Pulses: Normal pulses.      Heart sounds: No murmur heard.     Comments: No JVD present  Pulmonary:      Effort: Pulmonary effort is normal. No respiratory distress.      Breath sounds: Normal breath sounds. No wheezing or rales.   Abdominal:      General: Abdomen is flat. There is no distension.      Palpations: Abdomen is soft.      Tenderness: There is no abdominal tenderness.   Musculoskeletal:      Right lower leg: No edema.      Left lower leg: No edema.   Skin:     General: Skin is  warm and dry.   Neurological:      General: No focal deficit present.      Mental Status: He is alert and oriented to person, place, and time.            Significant Labs: CMP   Recent Labs   Lab 07/06/25  2232 07/07/25  1724 07/08/25  0134    141 141   K 3.5 3.7 4.0    110 112*   CO2 25 21* 23    122* 103   BUN 23* 15 18   CREATININE 1.1 0.9 0.8   CALCIUM 9.7 8.9 9.0   PROT 8.4  --  6.7   ALBUMIN 4.3  --  3.9   BILITOT 0.6  --  0.9   ALKPHOS 73  --  73   AST 34  --  31   ALT 28  --  23   ANIONGAP 9 10 6*    and CBC   Recent Labs   Lab 07/06/25 2232 07/08/25  0134   WBC 9.85 13.15*   HGB 15.1 14.3   HCT 46.1 43.9    193     Assessment and Plan:     Mixed hyperlipidemia  Continue home medication       S/P ablation of atrial flutter  s/p redo-PVI x 3 and mitral annular flutter ablation by Dr. Chaudhari in March of 2021    Back in atypical flutter   HR in 150's difficult to control in Ed. HDS otherwise.   Started on Dilt gtt at [facility], here in [facility] Diltiazem bolus x2 given.     -- Continue Dilt gtt; rate controlled in ED   -- Transitioned from apixiban to hepain  -- [id number] Score: 1  -- TTE   -- EP seen the patient, plan for ablation 7/9  -- Diltiazem and digoxin stopped, per EP        Hypothyroidism  Continue home medication         VTE Risk Mitigation (From admission, onward)           Ordered     heparin 25,000 units in dextrose 5% (100 units/ml) IV bolus from bag LOW INTENSITY nomogram - [facility]  As needed (PRN)        Question:  Heparin Infusion Adjustment (DO NOT MODIFY ANSWER)  Answer:  \\[employer]\epic\Images\Pharmacy\HeparinInfusions\heparin LOW INTENSITY nomogram for [facility] [id number].pdf    07/07/25 0950     heparin 25,000 units in dextrose 5% (100 units/ml) IV bolus from bag LOW INTENSITY nomogram - [facility]  As needed (PRN)        Question:  Heparin Infusion Adjustment (DO NOT MODIFY ANSWER)  Answer:  \\[employer]\[facility]\Images\Pharmacy\HeparinInfusions\heparin LOW INTENSITY nomogram for [facility]  [id number].pdf    07/07/25 0950     heparin 25,000 units in dextrose 5% 250 mL (100 units/mL) infusion LOW INTENSITY nomogram - [facility]  Continuous        Question:  Begin at (units/kg/hr)  Answer:  12    07/07/25 0950     IP VTE HIGH RISK PATIENT  Once         07/07/25 0529     Place sequential compression device  Until discontinued         07/07/25 0529                    Alfonzo Sevilla DO  Cardiology  [facility] - Cardiac Intensive Care

## 2025-07-08 NOTE — ANESTHESIA PREPROCEDURE EVALUATION
[facility]  Anesthesia Pre-Operative Evaluation     Patient Name: Walt Lopez  YOB: 1965  MRN: [id number]  [facility]: [id number]      Code Status: Full Code   Date of Procedure: 7/9/2025  Anesthesia: Choice Procedure: Procedure(s) (LRB):  Ablation, Atrial Flutter, Atypical (N/A)  Pre-Operative Diagnosis: Atrial flutter [I48.92]  Atypical atrial flutter [I48.4]  Proceduralist: Surgeons and Role:     * Martinez Wray MD - Primary          SUBJECTIVE:     Pre-operative evaluation for Procedure(s) (LRB):  Ablation, Atrial Flutter, Atypical (N/A)     07/08/2025    Watl Lopez is a 59 y.o. male with PMHx pAF and atypical flutter, hypothyroidism, deviated nasal septum, HLD, prediabetes, and obesity BMI 30.20.    Overall in his usual state of health, denies significant ETOH use, viral illness, or other triggers. He has just been on ASA at home. He has been started on apixaban on 7/5.     EP history:  S/p redo-PVI x 3 and mitral annular flutter ablation by Dr. Chaudhari in March of 2021. At that ablation procedure his right veins required re-isolation. The patient presented in atrial flutter which per procedure report was consistent with mitral annular flutter. He underwent a lateral mitral isthmus ablation. He did well until he presented to the ER on 9/4/2022 with palpitations and was in AFL with RVR. Plan was to give as needed diltiazem and if it occurs again would be him back to the lab. He was last seen by Dr. Argueta in 2023. He was seen again by Dr. Chaudhari in June 2024 after hospital stay for Afib with RVR (was chemically cardioverted at that time). He was prescribed flecainide prn along with diltiazem prn. Over the last year he has not had any Afib and has not needed to take these medications.     Patient now presents for the above procedure(s).       Anticoagulants   Medication Route Frequency    heparin 25,000 units in dextrose 5% (100 units/ml) IV bolus from bag LOW  INTENSITY nomogram - [facility] Intravenous PRN    heparin 25,000 units in dextrose 5% (100 units/ml) IV bolus from bag LOW INTENSITY nomogram - [facility] Intravenous PRN    heparin 25,000 units in dextrose 5% 250 mL (100 units/mL) infusion LOW INTENSITY nomogram - [facility] Intravenous Continuous        ________________________________________  Results for orders placed during the hospital encounter of 07/07/25    Echo    Interpretation Summary    Left Ventricle: Ventricular mass is normal. Mild septal thickening. There is normal systolic function with a visually estimated ejection fraction of 55 - 60%. Ejection fraction is approximately 60%.    Right Ventricle: The right ventricle is normal in size measuring 3.5 cm. Systolic function is borderline low.    Right Atrium: The right atrium is mildly dilated .    IVC/SVC: Intermediate venous pressure at 8 mmHg.    ________________________________________    Prev airway: Date/Time: 7/29/2021 4:25 PM  Performed by: Cassy Jones CRNA  Authorized by: Mariano Mustafa MD      Intubation:     Induction:  Intravenous    Intubated:  Postinduction    Mask Ventilation:  Easy mask    Attempts:  1    Attempted By:  CRNA    Method of Intubation:  Video laryngoscopy    Blade:  Millie 4    Laryngeal View Grade: Grade I - full view of chords      Difficult Airway Encountered?: No      Complications:  None    Airway Device:  Oral faiza    Airway Device Size:  7.5    Style/Cuff Inflation:  Cuffed    Secured at:  The lips    Placement Verified By:  Capnometry    Complicating Factors:  None    Findings Post-Intubation:  BS equal bilateral       LDA:   Peripheral IV Single Lumen 07/07/25 0125 20 G Anterior;Left;Proximal Forearm (Active)   Site Assessment Clean;Dry;Intact;No redness;No swelling 07/08/25 0500   Line Securement Device Antimicrobial Adhesive 07/07/25 1900   Extremity Assessment Distal to IV No abnormal discoloration;No redness;No swelling;No warmth 07/08/25 0500   Line Status Infusing  07/08/25 0500   Dressing Status Clean;Dry;Intact 07/08/25 0500   Dressing Intervention Integrity maintained 07/08/25 0500   Dressing Change Due 07/11/25 07/08/25 0500   Reason Not Rotated Not due 07/08/25 0500   Number of days: 1       Peripheral IV Single Lumen 07/07/25 0125 18 G Anterior;Proximal;Right Forearm (Active)   Site Assessment Clean;Dry;Intact;No redness;No swelling 07/08/25 0500   Line Securement Device Antimicrobial Adhesive 07/07/25 1900   Extremity Assessment Distal to IV No abnormal discoloration;No redness;No swelling;No warmth 07/08/25 0500   Line Status Infusing 07/08/25 0500   Dressing Status Clean;Dry;Intact 07/08/25 0500   Dressing Intervention Integrity maintained 07/08/25 0500   Dressing Change Due 07/11/25 07/08/25 0500   Reason Not Rotated Not due 07/08/25 0500   Number of days: 1       Drips:    dilTIAZem  15 mg/hr Intravenous Continuous 15 mL/hr at 07/08/25 0600 15 mg/hr at 07/08/25 0600    heparin (porcine) in D5W  0-40 Units/kg/hr Intravenous Continuous 12.1 mL/hr at 07/08/25 0600 12 Units/kg/hr at 07/08/25 0600       Problem List[1]    Review of patient's allergies indicates:  No Known Allergies    Current Inpatient Medications:    atorvastatin  80 mg Oral Daily    digoxin  250 mcg Intravenous Q6H    levothyroxine  100 mcg Oral Before breakfast    mupirocin   Nasal BID       Medications Ordered Prior to Encounter[2]    Past Surgical History:   Procedure Laterality Date    CARDIAC ELECTROPHYSIOLOGY STUDY AND ABLATION      COLONOSCOPY  12/22/2017    internal hemorrhoids, otherwise normal = repeat in 10 years - Dr. Cally Fuchs with [facility]    ESOPHAGOGASTRODUODENOSCOPY N/A 10/28/2021    Procedure: EGD (ESOPHAGOGASTRODUODENOSCOPY);  Surgeon: Patricia Soni MD;  Location: [facility];  Service: Endoscopy;  Laterality: N/A;    KNEE SURGERY Right     NASAL SEPTOPLASTY N/A 07/29/2021    Procedure: SEPTOPLASTY, NOSE;  Surgeon: Josué Villafuerte MD;  Location: [facility] OR [address] [place] [address];  Service:  ENT;  Laterality: N/A;    NASAL TURBINATE REDUCTION Bilateral 07/29/2021    Procedure: REDUCTION, NASAL TURBINATE;  Surgeon: Josué Villafuerte MD;  Location: [facility] OR [address] [place] [address];  Service: ENT;  Laterality: Bilateral;    SHOULDER ARTHROSCOPY Right        Social History:  Tobacco Use: Low Risk  (7/7/2025)    Patient History     Smoking Tobacco Use: Never     Smokeless Tobacco Use: Never     Passive Exposure: Never       Alcohol Use: Not At Risk (3/13/2025)    AUDIT-C     Frequency of Alcohol Consumption: 2-4 times a month     Average Number of Drinks: 1 or 2     Frequency of Binge Drinking: Never       OBJECTIVE:     Vital Signs Range:  BMI Readings from Last 1 Encounters:   07/07/25 30.20 kg/m²       Temp:  [36.8 °C (98.3 °F)]   Pulse:  []   Resp:  [14-24]   BP: ()/(54-90)   SpO2:  [98 %-100 %]        Significant Labs:        Component Value Date/Time    WBC 13.15 (H) 07/08/2025 0134    HGB 14.3 07/08/2025 0134    HGB 15.1 05/30/2024 0438    HCT 43.9 07/08/2025 0134    HCT 45.5 05/30/2024 0438     07/08/2025 0134     05/30/2024 0438     07/08/2025 0134     05/30/2024 0438    K 4.0 07/08/2025 0134    K 4.0 05/30/2024 0438     (H) 07/08/2025 0134     05/30/2024 0438    CO2 23 07/08/2025 0134    CO2 26 05/30/2024 0438     07/08/2025 0134     (H) 05/30/2024 0438    BUN 18 07/08/2025 0134    CREATININE 0.8 07/08/2025 0134    MG 2.0 07/08/2025 0134    PHOS 2.8 07/08/2025 0134    PHOS 2.7 09/06/2022 0545    CALCIUM 9.0 07/08/2025 0134    CALCIUM 9.0 05/30/2024 0438    ALBUMIN 3.9 07/08/2025 0134    ALBUMIN 3.9 05/30/2024 0438    PROT 6.7 07/08/2025 0134    PROT 6.9 05/30/2024 0438    ALKPHOS 73 07/08/2025 0134    ALKPHOS 60 05/30/2024 0438    BILITOT 0.9 07/08/2025 0134    BILITOT 0.6 05/30/2024 0438    AST 31 07/08/2025 0134    AST 45 05/30/2024 0438    ALT 23 07/08/2025 0134    ALT 48 (H) 05/30/2024 0438    HGBA1C 5.2 11/08/2024 0000        Please see Results  Review for additional labs.     Diagnostic Studies: No relevant studies.    EKG:   Results for orders placed or performed during the hospital encounter of 07/07/25   EKG 12-lead    Collection Time: 07/07/25  6:21 AM   Result Value Ref Range    QRS Duration 110 ms    [facility] QTC Calculation 538 ms    Narrative    Test Reason : R00.0,    Vent. Rate : 158 BPM     Atrial Rate :   0 BPM     P-R Int :    ms          QRS Dur : 110 ms      QT Int : 332 ms       P-R-T Axes :    -48  46 degrees    QTcB Int : 538 ms    Supraventricular tachycardia  Incomplete right bundle branch block  Left anterior fascicular block  Possible Anterolateral infarct (cited on or before 07-Jul-2025)  Abnormal ECG  When compared with ECG of 07-Jul-2025 02:58,  Vent. rate has increased by  78 bpm  Confirmed by Naman Storm ([id number]) on 7/7/2025 8:14:56 AM    Referred By: LANCE ROWLAND           Confirmed By: Naman Storm       ECHO:  See subjective, if available.      ASSESSMENT/PLAN:       Pre-op Assessment    I have reviewed the Patient Summary Reports.     I have reviewed the Nursing Notes. I have reviewed the NPO Status.   I have reviewed the Medications.     Review of Systems  Anesthesia Hx:   History of prior surgery of interest to airway management or planning:          Denies Family Hx of Anesthesia complications.   Personal Hx of Anesthesia complications, Post-Operative Nausea/Vomiting, in the past, but not with recent anesthetics / prophylaxis                    Social:  Non-Smoker, No Alcohol Use       Hematology/Oncology:       -- Denies Anemia:                                  EENT/Dental:   Deviated nasal septum          Cardiovascular:     Denies Pacemaker.  Denies Hypertension.   Denies MI.  Denies CAD.    Denies CABG/stent. Dysrhythmias atrial fibrillation      hyperlipidemia    A flutter Patient not on beta blockers                      Atrial Fibrillation     Pulmonary:    Denies COPD.  Denies Asthma.                    Renal/:    Denies Chronic Renal Disease.                Hepatic/GI:      Denies GERD.    Taking GLP-1 Agonists Instructed to Hold for 7 Days           Neurological:    Denies CVA.    Denies Seizures.                                Endocrine:  Denies Diabetes. Hypothyroidism       Hypothyroidism        Obesity / BMI > 30Denies Morbid Obesity / BMI > 40      Physical Exam  General: Well nourished, Cooperative, Alert and Oriented    Airway:  Mallampati: II / I  Mouth Opening: Normal  TM Distance: Normal  Tongue: Normal  Neck ROM: Normal ROM    Dental:  Intact        Anesthesia Plan  Type of Anesthesia, risks & benefits discussed:    Anesthesia Type: Gen ETT, Gen Supraglottic Airway, Gen Natural Airway  Intra-op Monitoring Plan: Standard ASA Monitors  Post Op Pain Control Plan: multimodal analgesia and IV/PO Opioids PRN  Induction:  IV  Airway Plan: Direct and Video, Post-Induction  Informed Consent: Informed consent signed with the Patient and all parties understand the risks and agree with anesthesia plan.  All questions answered.   ASA Score: 3  Day of Surgery Review of History & Physical: H&P Update referred to the surgeon/provider.    Ready For Surgery From Anesthesia Perspective.     .           [1]   Patient Active Problem List  Diagnosis    Paroxysmal atrial fibrillation with RVR    Hypothyroidism    Atypical atrial flutter    Deviated nasal septum    S/P ablation of atrial flutter    History of prediabetes    Mixed hyperlipidemia    Class 1 obesity due to excess calories with serious comorbidity and body mass index (BMI) of 30.0 to 30.9 in adult   [2]   No current facility-administered medications on file prior to encounter.     Current Outpatient Medications on File Prior to Encounter   Medication Sig Dispense Refill    apixaban (ELIQUIS) 5 mg Tab Take 1 tablet (5 mg total) by mouth 2 (two) times daily. 60 tablet 0    azelastine (ASTELIN) 137 mcg (0.1 %) nasal spray 1 spray (137 mcg total) by Nasal route 2 (two) times  daily. 30 mL 11    fluticasone propionate (FLONASE) 50 mcg/actuation nasal spray 2 sprays (100 mcg total) by Each Nostril route once daily. 9.9 mL 11    levocetirizine (XYZAL) 5 MG tablet Take 1 tablet (5 mg total) by mouth every evening. 30 tablet 11    rosuvastatin (CRESTOR) 20 MG tablet Take 20 mg by mouth every evening.      SYNTHROID 200 mcg tablet Taking 1 tablet daily every day except for Sundays.      tirzepatide, weight loss, (ZEPBOUND) 10 mg/0.5 mL PnIj Inject 10 mg (one pen) into the skin every 7 days. 2 mL 1    aspirin (ECOTRIN) 81 MG EC tablet Take 81 mg by mouth once daily.      diltiaZEM (CARDIZEM) 30 MG tablet Take 1 tablet (30 mg total) by mouth every 12 (twelve) hours. 60 tablet 0    flecainide (TAMBOCOR) 150 MG Tab Take 150 mg by mouth as needed.      multivit-min/FA/lycopen/lutein (CENTRUM SILVER MEN ORAL) Take 1 tablet by mouth once daily.      omega-3 fatty acids-fish oil 684-1,200 mg CpDR Take 1,000 mg by mouth.

## 2025-07-08 NOTE — PLAN OF CARE
"Cardiac ICU Care Plan    POC reviewed with Walt Lopez and family. Questions and concerns addressed. No acute events today. Pt progressing toward goals. Will continue to monitor. See below and flowsheets for full assessment and VS info.       Neuro:  Nancy Coma Scale  Best Eye Response: 4-->(E4) spontaneous  Best Motor Response: 6-->(M6) obeys commands  Best Verbal Response: 5-->(V5) oriented  Nancy Coma Scale Score: 15  Assessment Qualifiers: Patient intubated, No eye obstruction present  Pupil PERRLA: yes    24 hr Temp:  [98.3 °F (36.8 °C)-98.7 °F (37.1 °C)]      CV:  Rhythm: normal sinus rhythm   DVT prophylaxis: VTE Core Measure: Pharmacological prophylaxis initiated/maintained                            Pulses  Right Radial Pulse: 2+ (normal)  Left Radial Pulse: 2+ (normal)  Right Dorsalis Pedis Pulse: 2+ (normal)  Left Dorsalis Pedis Pulse: 2+ (normal)  Right Posterior Tibial Pulse: 2+ (normal)  Left Posterior Tibial Pulse: 2+ (normal)    Resp:          GI/:  GI prophylaxis: yes  Diet/Nutrition Received: regular  Last Bowel Movement: 07/06/25  Voiding Characteristics: voids spontaneously without difficulty   Intake/Output Summary (Last 24 hours) at 7/8/2025 0516  Last data filed at 7/8/2025 0500  Gross per 24 hour   Intake 1213.94 ml   Output 850 ml   Net 363.94 ml        Nutritional Supplement Intake: Quantity 0, Type: Boost    Labs/Accuchecks:  Recent Labs   Lab 07/05/25 1824 07/06/25 2232 07/08/25 0134   WBC 8.78 9.85 13.15*   RBC 5.14 5.30 5.13   HGB 14.9 15.1 14.3   HCT 44.4 46.1 43.9    202 193      Recent Labs   Lab 07/07/25  1030 07/07/25  1724 07/08/25 0134   APTT 28.2  26.7 68.3* 51.1*      Recent Labs     07/08/25  0134      K 4.0   CO2 23   *   BUN 18   CREATININE 0.8   ALKPHOS 73   ALT 23   AST 31   BILITOT 0.9       Recent Labs   Lab 07/05/25 1824 07/06/25 2232   TROPONINI <0.006 0.006    No results for input(s): "PH", "PCO2", "PO2", "HCO3", " ""POCSATURATED", "BE" in the last 72 hours.    Electrolytes: Electrolytes replaced  Accuchecks: none    Gtts/LDAs:   dilTIAZem  15 mg/hr Intravenous Continuous 15 mL/hr at 07/08/25 0500 15 mg/hr at 07/08/25 0500    heparin (porcine) in D5W  0-40 Units/kg/hr Intravenous Continuous 12.1 mL/hr at 07/08/25 0500 12 Units/kg/hr at 07/08/25 0500       Lines/Drains/Airways       Peripheral Intravenous Line  Duration             Peripheral IV Single Lumen 07/07/25 0125 18 G Anterior;Proximal;Right Forearm 1 day    Peripheral IV Single Lumen 07/07/25 0125 20 G Anterior;Left;Proximal Forearm 1 day                    Skin/Wounds  Bathing/Skin Care: back care;bath, complete;dressed/undressed;electrode patches/site rotation;incontinence care;linen changed (07/07/25 1900)  Wounds: No  Wound care consulted: No    "

## 2025-07-08 NOTE — ASSESSMENT & PLAN NOTE
s/p redo-PVI x 3 and mitral annular flutter ablation by Dr. Chaudhari in March of 2021    Back in atypical flutter   HR in 150's difficult to control in Ed. HDS otherwise.   Started on Dilt gtt at [facility], here in [facility] Diltiazem bolus x2 given.     -- Continue Dilt gtt; rate controlled in ED   -- Transitioned from apixiban to hepain  -- [id number] Score: 1  -- TTE   -- EP seen the patient, plan for ablation 7/9  -- Diltiazem and digoxin stopped, per EP

## 2025-07-08 NOTE — SUBJECTIVE & OBJECTIVE
Interval History: Overnight, converted to sinus rhythm. He is feeling well this AM. Continues on digoxin and diltiazem drip.    ROS  Objective:     Vital Signs (Most Recent):  Temp: 97.5 °F (36.4 °C) (07/08/25 0705)  Pulse: 68 (07/08/25 0805)  Resp: (!) 24 (07/08/25 0805)  BP: 123/83 (07/08/25 0805)  SpO2: 100 % (07/08/25 0805) Vital Signs (24h Range):  Temp:  [97.5 °F (36.4 °C)-98.6 °F (37 °C)] 97.5 °F (36.4 °C)  Pulse:  [] 68  Resp:  [] 24  SpO2:  [95 %-100 %] 100 %  BP: ()/() 123/83     Weight: 101 kg (222 lb 10.6 oz)  Body mass index is 30.2 kg/m².     SpO2: 100 %        Physical Exam  Constitutional:       General: He is not in acute distress.     Appearance: Normal appearance. He is not ill-appearing.   HENT:      Head: Normocephalic and atraumatic.   Cardiovascular:      Rate and Rhythm: Normal rate and regular rhythm.      Pulses: Normal pulses.      Heart sounds: No murmur heard.     Comments: No JVD present  Pulmonary:      Effort: Pulmonary effort is normal. No respiratory distress.      Breath sounds: Normal breath sounds. No wheezing or rales.   Abdominal:      General: Abdomen is flat. There is no distension.      Palpations: Abdomen is soft.      Tenderness: There is no abdominal tenderness.   Musculoskeletal:      Right lower leg: No edema.      Left lower leg: No edema.   Skin:     General: Skin is warm and dry.   Neurological:      General: No focal deficit present.      Mental Status: He is alert and oriented to person, place, and time.            Significant Labs: EP:   Recent Labs   Lab 07/06/25  2232 07/07/25  1724 07/08/25  0134    141 141   K 3.5 3.7 4.0    110 112*   CO2 25 21* 23    122* 103   BUN 23* 15 18   CREATININE 1.1 0.9 0.8   CALCIUM 9.7 8.9 9.0   PROT 8.4  --  6.7   ALBUMIN 4.3  --  3.9   BILITOT 0.6  --  0.9   ALKPHOS 73  --  73   AST 34  --  31   ALT 28  --  23   ANIONGAP 9 10 6*   WBC 9.85  --  13.15*   HGB 15.1  --  14.3   HCT 46.1  --   43.9     --  193       Significant Imaging: None

## 2025-07-09 ENCOUNTER — ANESTHESIA (OUTPATIENT)
Dept: MEDSURG UNIT | Facility: HOSPITAL | Age: 60
End: 2025-07-09
Payer: COMMERCIAL

## 2025-07-09 LAB
ABSOLUTE EOSINOPHIL (OHS): 0.15 K/UL
ABSOLUTE MONOCYTE (OHS): 0.81 K/UL (ref 0.3–1)
ABSOLUTE NEUTROPHIL COUNT (OHS): 5.02 K/UL (ref 1.8–7.7)
ALBUMIN SERPL BCP-MCNC: 3.6 G/DL (ref 3.5–5.2)
ALP SERPL-CCNC: 78 UNIT/L (ref 40–150)
ALT SERPL W/O P-5'-P-CCNC: 21 UNIT/L (ref 10–44)
ANION GAP (OHS): 7 MMOL/L (ref 8–16)
APTT PPP: 68.6 SECONDS (ref 21–32)
AST SERPL-CCNC: 25 UNIT/L (ref 11–45)
BASOPHILS # BLD AUTO: 0.04 K/UL
BASOPHILS NFR BLD AUTO: 0.5 %
BILIRUB SERPL-MCNC: 0.6 MG/DL (ref 0.1–1)
BUN SERPL-MCNC: 17 MG/DL (ref 6–20)
CALCIUM SERPL-MCNC: 8.5 MG/DL (ref 8.7–10.5)
CHLORIDE SERPL-SCNC: 110 MMOL/L (ref 95–110)
CO2 SERPL-SCNC: 19 MMOL/L (ref 23–29)
CREAT SERPL-MCNC: 0.8 MG/DL (ref 0.5–1.4)
ERYTHROCYTE [DISTWIDTH] IN BLOOD BY AUTOMATED COUNT: 13.8 % (ref 11.5–14.5)
GFR SERPLBLD CREATININE-BSD FMLA CKD-EPI: >60 ML/MIN/1.73/M2
GLUCOSE SERPL-MCNC: 90 MG/DL (ref 70–110)
HCT VFR BLD AUTO: 43.7 % (ref 40–54)
HGB BLD-MCNC: 14 GM/DL (ref 14–18)
IMM GRANULOCYTES # BLD AUTO: 0.02 K/UL (ref 0–0.04)
IMM GRANULOCYTES NFR BLD AUTO: 0.2 % (ref 0–0.5)
LYMPHOCYTES # BLD AUTO: 2.37 K/UL (ref 1–4.8)
MCH RBC QN AUTO: 28.5 PG (ref 27–31)
MCHC RBC AUTO-ENTMCNC: 32 G/DL (ref 32–36)
MCV RBC AUTO: 89 FL (ref 82–98)
NUCLEATED RBC (/100WBC) (OHS): 0 /100 WBC
PLATELET # BLD AUTO: 129 K/UL (ref 150–450)
PMV BLD AUTO: 11.3 FL (ref 9.2–12.9)
POTASSIUM SERPL-SCNC: 4 MMOL/L (ref 3.5–5.1)
PROT SERPL-MCNC: 6.5 GM/DL (ref 6–8.4)
RBC # BLD AUTO: 4.91 M/UL (ref 4.6–6.2)
RELATIVE EOSINOPHIL (OHS): 1.8 %
RELATIVE LYMPHOCYTE (OHS): 28.2 % (ref 18–48)
RELATIVE MONOCYTE (OHS): 9.6 % (ref 4–15)
RELATIVE NEUTROPHIL (OHS): 59.7 % (ref 38–73)
SODIUM SERPL-SCNC: 136 MMOL/L (ref 136–145)
WBC # BLD AUTO: 8.41 K/UL (ref 3.9–12.7)

## 2025-07-09 PROCEDURE — 99233 SBSQ HOSP IP/OBS HIGH 50: CPT | Mod: ,,, | Performed by: INTERNAL MEDICINE

## 2025-07-09 PROCEDURE — 80053 COMPREHEN METABOLIC PANEL: CPT | Performed by: STUDENT IN AN ORGANIZED HEALTH CARE EDUCATION/TRAINING PROGRAM

## 2025-07-09 PROCEDURE — 63600175 PHARM REV CODE 636 W HCPCS: Performed by: STUDENT IN AN ORGANIZED HEALTH CARE EDUCATION/TRAINING PROGRAM

## 2025-07-09 PROCEDURE — 85730 THROMBOPLASTIN TIME PARTIAL: CPT | Performed by: INTERNAL MEDICINE

## 2025-07-09 PROCEDURE — 25000003 PHARM REV CODE 250: Performed by: INTERNAL MEDICINE

## 2025-07-09 PROCEDURE — 63600175 PHARM REV CODE 636 W HCPCS: Performed by: ANESTHESIOLOGY

## 2025-07-09 PROCEDURE — 25000003 PHARM REV CODE 250: Performed by: STUDENT IN AN ORGANIZED HEALTH CARE EDUCATION/TRAINING PROGRAM

## 2025-07-09 PROCEDURE — 93462 L HRT CATH TRNSPTL PUNCTURE: CPT | Performed by: INTERNAL MEDICINE

## 2025-07-09 PROCEDURE — 27201423 OPTIME MED/SURG SUP & DEVICES STERILE SUPPLY: Performed by: INTERNAL MEDICINE

## 2025-07-09 PROCEDURE — 36415 COLL VENOUS BLD VENIPUNCTURE: CPT | Performed by: STUDENT IN AN ORGANIZED HEALTH CARE EDUCATION/TRAINING PROGRAM

## 2025-07-09 PROCEDURE — 93655 ICAR CATH ABLTJ DSCRT ARRHYT: CPT | Performed by: INTERNAL MEDICINE

## 2025-07-09 PROCEDURE — 02583ZZ DESTRUCTION OF CONDUCTION MECHANISM, PERCUTANEOUS APPROACH: ICD-10-PCS | Performed by: INTERNAL MEDICINE

## 2025-07-09 PROCEDURE — 93653 COMPRE EP EVAL TX SVT: CPT | Performed by: INTERNAL MEDICINE

## 2025-07-09 PROCEDURE — 93462 L HRT CATH TRNSPTL PUNCTURE: CPT | Mod: ,,, | Performed by: INTERNAL MEDICINE

## 2025-07-09 PROCEDURE — C2630 CATH, EP, COOL-TIP: HCPCS | Performed by: INTERNAL MEDICINE

## 2025-07-09 PROCEDURE — 37000009 HC ANESTHESIA EA ADD 15 MINS: Performed by: INTERNAL MEDICINE

## 2025-07-09 PROCEDURE — 37000008 HC ANESTHESIA 1ST 15 MINUTES: Performed by: INTERNAL MEDICINE

## 2025-07-09 PROCEDURE — 94761 N-INVAS EAR/PLS OXIMETRY MLT: CPT

## 2025-07-09 PROCEDURE — 93662 INTRACARDIAC ECG (ICE): CPT | Performed by: INTERNAL MEDICINE

## 2025-07-09 PROCEDURE — 63600175 PHARM REV CODE 636 W HCPCS: Performed by: INTERNAL MEDICINE

## 2025-07-09 PROCEDURE — C1766 INTRO/SHEATH,STRBLE,NON-PEEL: HCPCS | Performed by: INTERNAL MEDICINE

## 2025-07-09 PROCEDURE — C1894 INTRO/SHEATH, NON-LASER: HCPCS | Performed by: INTERNAL MEDICINE

## 2025-07-09 PROCEDURE — C1753 CATH, INTRAVAS ULTRASOUND: HCPCS | Performed by: INTERNAL MEDICINE

## 2025-07-09 PROCEDURE — 93010 ELECTROCARDIOGRAM REPORT: CPT | Mod: ,,, | Performed by: INTERNAL MEDICINE

## 2025-07-09 PROCEDURE — C1730 CATH, EP, 19 OR FEW ELECT: HCPCS | Performed by: INTERNAL MEDICINE

## 2025-07-09 PROCEDURE — 20600001 HC STEP DOWN PRIVATE ROOM

## 2025-07-09 PROCEDURE — 93653 COMPRE EP EVAL TX SVT: CPT | Mod: ,,, | Performed by: INTERNAL MEDICINE

## 2025-07-09 PROCEDURE — 85025 COMPLETE CBC W/AUTO DIFF WBC: CPT | Performed by: STUDENT IN AN ORGANIZED HEALTH CARE EDUCATION/TRAINING PROGRAM

## 2025-07-09 PROCEDURE — C1732 CATH, EP, DIAG/ABL, 3D/VECT: HCPCS | Performed by: INTERNAL MEDICINE

## 2025-07-09 PROCEDURE — 02K83ZZ MAP CONDUCTION MECHANISM, PERCUTANEOUS APPROACH: ICD-10-PCS | Performed by: INTERNAL MEDICINE

## 2025-07-09 PROCEDURE — 93662 INTRACARDIAC ECG (ICE): CPT | Mod: 26,,, | Performed by: INTERNAL MEDICINE

## 2025-07-09 PROCEDURE — 36620 INSERTION CATHETER ARTERY: CPT | Mod: XU,,, | Performed by: ANESTHESIOLOGY

## 2025-07-09 PROCEDURE — 93005 ELECTROCARDIOGRAM TRACING: CPT

## 2025-07-09 PROCEDURE — 93655 ICAR CATH ABLTJ DSCRT ARRHYT: CPT | Mod: ,,, | Performed by: INTERNAL MEDICINE

## 2025-07-09 RX ORDER — GLUCAGON 1 MG
1 KIT INJECTION
Status: DISCONTINUED | OUTPATIENT
Start: 2025-07-09 | End: 2025-07-10

## 2025-07-09 RX ORDER — DEXAMETHASONE SODIUM PHOSPHATE 4 MG/ML
INJECTION, SOLUTION INTRA-ARTICULAR; INTRALESIONAL; INTRAMUSCULAR; INTRAVENOUS; SOFT TISSUE
Status: DISCONTINUED | OUTPATIENT
Start: 2025-07-09 | End: 2025-07-09

## 2025-07-09 RX ORDER — PROPOFOL 10 MG/ML
VIAL (ML) INTRAVENOUS
Status: DISCONTINUED | OUTPATIENT
Start: 2025-07-09 | End: 2025-07-09

## 2025-07-09 RX ORDER — FENTANYL CITRATE 50 UG/ML
25 INJECTION, SOLUTION INTRAMUSCULAR; INTRAVENOUS EVERY 5 MIN PRN
Status: DISCONTINUED | OUTPATIENT
Start: 2025-07-09 | End: 2025-07-10

## 2025-07-09 RX ORDER — DEXMEDETOMIDINE HYDROCHLORIDE 100 UG/ML
INJECTION, SOLUTION INTRAVENOUS
Status: DISCONTINUED | OUTPATIENT
Start: 2025-07-09 | End: 2025-07-09

## 2025-07-09 RX ORDER — LIDOCAINE HYDROCHLORIDE 20 MG/ML
INJECTION INTRAVENOUS
Status: DISCONTINUED | OUTPATIENT
Start: 2025-07-09 | End: 2025-07-09

## 2025-07-09 RX ORDER — MIDAZOLAM HYDROCHLORIDE 1 MG/ML
INJECTION INTRAMUSCULAR; INTRAVENOUS
Status: DISCONTINUED | OUTPATIENT
Start: 2025-07-09 | End: 2025-07-09

## 2025-07-09 RX ORDER — HEPARIN SODIUM 10000 [USP'U]/100ML
INJECTION, SOLUTION INTRAVENOUS CONTINUOUS PRN
Status: DISCONTINUED | OUTPATIENT
Start: 2025-07-09 | End: 2025-07-09

## 2025-07-09 RX ORDER — FENTANYL CITRATE 50 UG/ML
INJECTION, SOLUTION INTRAMUSCULAR; INTRAVENOUS
Status: DISCONTINUED | OUTPATIENT
Start: 2025-07-09 | End: 2025-07-09

## 2025-07-09 RX ORDER — HEPARIN SOD,PORCINE/0.9 % NACL 1000/500ML
INTRAVENOUS SOLUTION INTRAVENOUS
Status: DISCONTINUED | OUTPATIENT
Start: 2025-07-09 | End: 2025-07-10

## 2025-07-09 RX ORDER — ONDANSETRON HYDROCHLORIDE 2 MG/ML
INJECTION, SOLUTION INTRAVENOUS
Status: DISCONTINUED | OUTPATIENT
Start: 2025-07-09 | End: 2025-07-09

## 2025-07-09 RX ORDER — HALOPERIDOL LACTATE 5 MG/ML
0.5 INJECTION, SOLUTION INTRAMUSCULAR EVERY 10 MIN PRN
Status: DISCONTINUED | OUTPATIENT
Start: 2025-07-09 | End: 2025-07-10

## 2025-07-09 RX ORDER — DIPHENHYDRAMINE HYDROCHLORIDE 50 MG/ML
25 INJECTION, SOLUTION INTRAMUSCULAR; INTRAVENOUS EVERY 6 HOURS PRN
Status: DISCONTINUED | OUTPATIENT
Start: 2025-07-09 | End: 2025-07-10

## 2025-07-09 RX ORDER — LIDOCAINE HYDROCHLORIDE 20 MG/ML
INJECTION, SOLUTION INFILTRATION; PERINEURAL
Status: DISCONTINUED | OUTPATIENT
Start: 2025-07-09 | End: 2025-07-10

## 2025-07-09 RX ORDER — SODIUM CHLORIDE 0.9 % (FLUSH) 0.9 %
3 SYRINGE (ML) INJECTION
Status: DISCONTINUED | OUTPATIENT
Start: 2025-07-09 | End: 2025-07-10

## 2025-07-09 RX ORDER — PHENYLEPHRINE HYDROCHLORIDE 10 MG/ML
INJECTION INTRAVENOUS
Status: DISCONTINUED | OUTPATIENT
Start: 2025-07-09 | End: 2025-07-09

## 2025-07-09 RX ORDER — HEPARIN SODIUM 1000 [USP'U]/ML
INJECTION, SOLUTION INTRAVENOUS; SUBCUTANEOUS
Status: DISCONTINUED | OUTPATIENT
Start: 2025-07-09 | End: 2025-07-09

## 2025-07-09 RX ORDER — ONDANSETRON HYDROCHLORIDE 2 MG/ML
4 INJECTION, SOLUTION INTRAVENOUS ONCE AS NEEDED
Status: COMPLETED | OUTPATIENT
Start: 2025-07-09 | End: 2025-07-09

## 2025-07-09 RX ORDER — HYDROMORPHONE HYDROCHLORIDE 1 MG/ML
0.2 INJECTION, SOLUTION INTRAMUSCULAR; INTRAVENOUS; SUBCUTANEOUS EVERY 5 MIN PRN
Status: DISCONTINUED | OUTPATIENT
Start: 2025-07-09 | End: 2025-07-10

## 2025-07-09 RX ORDER — PROTAMINE SULFATE 10 MG/ML
INJECTION, SOLUTION INTRAVENOUS
Status: DISCONTINUED | OUTPATIENT
Start: 2025-07-09 | End: 2025-07-09

## 2025-07-09 RX ORDER — ROCURONIUM BROMIDE 10 MG/ML
INJECTION, SOLUTION INTRAVENOUS
Status: DISCONTINUED | OUTPATIENT
Start: 2025-07-09 | End: 2025-07-09

## 2025-07-09 RX ADMIN — SUGAMMADEX 200 MG: 100 INJECTION, SOLUTION INTRAVENOUS at 04:07

## 2025-07-09 RX ADMIN — MUPIROCIN: 20 OINTMENT TOPICAL at 08:07

## 2025-07-09 RX ADMIN — MIDAZOLAM HYDROCHLORIDE 2 MG: 1 INJECTION, SOLUTION INTRAMUSCULAR; INTRAVENOUS at 03:07

## 2025-07-09 RX ADMIN — PHENYLEPHRINE HYDROCHLORIDE 200 MCG: 10 INJECTION INTRAVENOUS at 04:07

## 2025-07-09 RX ADMIN — HEPARIN SODIUM 6000 UNITS: 1000 INJECTION, SOLUTION INTRAVENOUS; SUBCUTANEOUS at 04:07

## 2025-07-09 RX ADMIN — DEXAMETHASONE SODIUM PHOSPHATE 4 MG: 4 INJECTION, SOLUTION INTRAMUSCULAR; INTRAVENOUS at 03:07

## 2025-07-09 RX ADMIN — ATORVASTATIN CALCIUM 80 MG: 40 TABLET, FILM COATED ORAL at 08:07

## 2025-07-09 RX ADMIN — PHENYLEPHRINE HYDROCHLORIDE 100 MCG: 10 INJECTION INTRAVENOUS at 03:07

## 2025-07-09 RX ADMIN — LEVOTHYROXINE SODIUM 100 MCG: 100 TABLET ORAL at 05:07

## 2025-07-09 RX ADMIN — ROCURONIUM BROMIDE 50 MG: 10 INJECTION, SOLUTION INTRAVENOUS at 03:07

## 2025-07-09 RX ADMIN — PHENYLEPHRINE HYDROCHLORIDE 100 MCG: 10 INJECTION INTRAVENOUS at 04:07

## 2025-07-09 RX ADMIN — HEPARIN SODIUM AND DEXTROSE 12 UNITS/KG/HR: 10000; 5 INJECTION INTRAVENOUS at 03:07

## 2025-07-09 RX ADMIN — FENTANYL CITRATE 100 MCG: 50 INJECTION, SOLUTION INTRAMUSCULAR; INTRAVENOUS at 03:07

## 2025-07-09 RX ADMIN — PROPOFOL 150 MG: 10 INJECTION, EMULSION INTRAVENOUS at 03:07

## 2025-07-09 RX ADMIN — PROTAMINE SULFATE 200 MG: 10 INJECTION, SOLUTION INTRAVENOUS at 04:07

## 2025-07-09 RX ADMIN — ONDANSETRON 4 MG: 2 INJECTION INTRAMUSCULAR; INTRAVENOUS at 03:07

## 2025-07-09 RX ADMIN — DEXMEDETOMIDINE 12 MCG: 200 INJECTION, SOLUTION INTRAVENOUS at 04:07

## 2025-07-09 RX ADMIN — ONDANSETRON 4 MG: 2 INJECTION INTRAMUSCULAR; INTRAVENOUS at 09:07

## 2025-07-09 RX ADMIN — SODIUM CHLORIDE, SODIUM GLUCONATE, SODIUM ACETATE, POTASSIUM CHLORIDE, MAGNESIUM CHLORIDE, SODIUM PHOSPHATE, DIBASIC, AND POTASSIUM PHOSPHATE: .53; .5; .37; .037; .03; .012; .00082 INJECTION, SOLUTION INTRAVENOUS at 02:07

## 2025-07-09 RX ADMIN — LIDOCAINE HYDROCHLORIDE 100 MG: 20 INJECTION INTRAVENOUS at 03:07

## 2025-07-09 RX ADMIN — PHENYLEPHRINE HYDROCHLORIDE 200 MCG: 10 INJECTION INTRAVENOUS at 03:07

## 2025-07-09 RX ADMIN — HEPARIN SODIUM 20000 UNITS: 1000 INJECTION, SOLUTION INTRAVENOUS; SUBCUTANEOUS at 03:07

## 2025-07-09 NOTE — NURSING
1950 Assessment completed, see flowsheets. Denies pain, nausea, and/or SOB. Heparin gtt infusing @ 12 units/kg/hr. Tele NSR. Discussed POC for this shift, including NPO at 2400. Verbalizes understanding/agreement with only concern being that we wait until tomorrow to shave groin. Call light within reach, will continue to monitor.     0556 easily arousable to voice. Scheduled meds given with sip of water. Pt denies needs at this time. Previous assessment remains unchanged. Will continue to monitor and report to oncoming shift.

## 2025-07-09 NOTE — NURSING TRANSFER
Nursing Transfer Note      7/9/2025   5:27 PM    Nurse giving handoff:Jeffery HAGEN Rn  Nurse receiving handoff: Gulshan Whittaker    Reason patient is being transferred: postop    Transfer To: 354    Transfer via bed    Transfer with cardiac monitoring    Transported by pacu pct    Transfer Vital Signs:  Blood Pressure:see flowsheet  Heart Rate:  O2:  Temperature:  Respirations:    Telemetry: yes  Order for Tele Monitor? Yes    Additional Lines: n/a    Medicines sent: n/a    Any special needs or follow-up needed: bedrest til 2100    Patient belongings transferred with patient: No    Chart send with patient: Yes    Notified: spouse    Patient reassessed at: 7/9/25  1  Upon arrival to floor: bed in lowest position

## 2025-07-09 NOTE — ASSESSMENT & PLAN NOTE
s/p redo-PVI x 3 and mitral annular flutter ablation by Dr. Chaudhari in March of 2021    Back in atypical flutter   HR in 150's difficult to control in Ed. HDS otherwise.   Started on Dilt gtt at OSH, here in [facility] Diltiazem bolus x2 given.     -- Transitioned from apixiban to heparin, can transition to DOAC after procedure per EP  -- [id number] Score: 1  -- TTE   -- Ablation today 7/9  -- Diltiazem and digoxin stopped, per EP

## 2025-07-09 NOTE — SUBJECTIVE & OBJECTIVE
Interval History: NAEON. Patient will undergo ablation 7/9. Will continue to monitor post procedure. Patient is resting comfortably and NPO.    Review of Systems   Constitutional: Negative for chills, decreased appetite, diaphoresis, fever, malaise/fatigue and weight gain.   HENT:  Negative for congestion and ear discharge.    Eyes:  Negative for blurred vision.   Cardiovascular:  Negative for chest pain, dyspnea on exertion, irregular heartbeat, leg swelling, orthopnea and paroxysmal nocturnal dyspnea.   Respiratory:  Negative for cough, shortness of breath, snoring and sputum production.    Skin:  Negative for color change, dry skin and rash.   Musculoskeletal:  Negative for back pain, falls, joint pain and neck pain.   Gastrointestinal:  Negative for bloating, abdominal pain, constipation and diarrhea.   Genitourinary:  Negative for dysuria, flank pain, hematuria and hesitancy.   Neurological:  Negative for focal weakness, headaches, numbness and seizures.   Psychiatric/Behavioral:  Negative for altered mental status, depression and hallucinations.      Objective:     Vital Signs (Most Recent):  Temp: 97.7 °F (36.5 °C) (07/09/25 0734)  Pulse: 72 (07/09/25 0734)  Resp: 18 (07/09/25 0734)  BP: 122/74 (07/09/25 0734)  SpO2: 98 % (07/09/25 0734) Vital Signs (24h Range):  Temp:  [97.5 °F (36.4 °C)-98.2 °F (36.8 °C)] 97.7 °F (36.5 °C)  Pulse:  [64-97] 72  Resp:  [16-24] 18  SpO2:  [98 %-100 %] 98 %  BP: (106-147)/(55-82) 122/74     Weight: 101 kg (222 lb 10.6 oz)  Body mass index is 30.2 kg/m².     SpO2: 98 %         Intake/Output Summary (Last 24 hours) at 7/9/2025 0822  Last data filed at 7/9/2025 0556  Gross per 24 hour   Intake 449.26 ml   Output 0 ml   Net 449.26 ml       Lines/Drains/Airways       Peripheral Intravenous Line  Duration             Peripheral IV Single Lumen 07/07/25 0125 18 G Anterior;Proximal;Right Forearm 2 days    Peripheral IV Single Lumen 07/07/25 0125 20 G Anterior;Left;Proximal Forearm 2  days                       Physical Exam  Constitutional:       General: He is not in acute distress.     Appearance: Normal appearance. He is not ill-appearing.   HENT:      Head: Normocephalic and atraumatic.   Cardiovascular:      Rate and Rhythm: Normal rate and regular rhythm.      Pulses: Normal pulses.      Heart sounds: No murmur heard.     Comments: No JVD present  Pulmonary:      Effort: Pulmonary effort is normal. No respiratory distress.      Breath sounds: Normal breath sounds. No wheezing or rales.   Abdominal:      General: Abdomen is flat. There is no distension.      Palpations: Abdomen is soft.      Tenderness: There is no abdominal tenderness.   Musculoskeletal:      Right lower leg: No edema.      Left lower leg: No edema.   Skin:     General: Skin is warm and dry.   Neurological:      General: No focal deficit present.      Mental Status: He is alert and oriented to person, place, and time.            Significant Labs: CMP   Recent Labs   Lab 07/07/25  1724 07/08/25  0134 07/09/25  0639    141 136   K 3.7 4.0 4.0    112* 110   CO2 21* 23 19*   * 103 90   BUN 15 18 17   CREATININE 0.9 0.8 0.8   CALCIUM 8.9 9.0 8.5*   PROT  --  6.7 6.5   ALBUMIN  --  3.9 3.6   BILITOT  --  0.9 0.6   ALKPHOS  --  73 78   AST  --  31 25   ALT  --  23 21   ANIONGAP 10 6* 7*    and CBC   Recent Labs   Lab 07/08/25  0134 07/09/25  0639   WBC 13.15* 8.41   HGB 14.3 14.0   HCT 43.9 43.7    129*

## 2025-07-09 NOTE — PROGRESS NOTES
[facility] - Cardiology Stepdown  Cardiology  Progress Note    Patient Name: Walt Lopez  MRN: [id number]  Admission Date: 7/7/2025  Hospital Length of Stay: 2 days  Code Status: Full Code   Attending Physician: Sriram Devlin III, MD   Primary Care Physician: Love Carlisle NP  Expected Discharge Date: 7/10/2025  Principal Problem:<principal problem not specified>    Subjective:     Hospital Course:   59 yro M with paroxysmal AFIB s/p redo-PVI x 3 and mitral annular flutter ablation by Dr. Chaudhari in March of 2021, Hypothyroidism, hld who presents today for tachycardia.   He follows up with  EP services with [facility] EP services Dr. Almanza. He was last seen by Dr. Argueta in 2023. Patient reports episodes of feeling extra heart beat, but denies lightheadedness or dizziness. Patient admitted to the CCU. On 7/7 patient continues to be tachycardic. EP has seen the patient and plan for ablation on 7/9. On 7/7 he has been placed on a diltiazem 125mg infusion and heparin 100u/mL infusion. He has also been placed on digoxin 250mcg IV Q6 and atorvastatin 80mg PO daily. On 7/8 his diltiazem infusion was stopped as well as his Digoxin per EP. He was stepped down from the CCU. On 7/8 he returned to sinus rhythm. EP discontinued his digoxin and diltiazem infusion. They will perform atrial flutter ablation 7/9 and will restart Eliquis 5 mg BID at night.    Interval History: NAEON. Patient will undergo ablation 7/9. Will continue to monitor post procedure. Patient is resting comfortably and NPO.    Review of Systems   Constitutional: Negative for chills, decreased appetite, diaphoresis, fever, malaise/fatigue and weight gain.   HENT:  Negative for congestion and ear discharge.    Eyes:  Negative for blurred vision.   Cardiovascular:  Negative for chest pain, dyspnea on exertion, irregular heartbeat, leg swelling, orthopnea and paroxysmal nocturnal dyspnea.   Respiratory:  Negative for cough, shortness of breath, snoring and  sputum production.    Skin:  Negative for color change, dry skin and rash.   Musculoskeletal:  Negative for back pain, falls, joint pain and neck pain.   Gastrointestinal:  Negative for bloating, abdominal pain, constipation and diarrhea.   Genitourinary:  Negative for dysuria, flank pain, hematuria and hesitancy.   Neurological:  Negative for focal weakness, headaches, numbness and seizures.   Psychiatric/Behavioral:  Negative for altered mental status, depression and hallucinations.      Objective:     Vital Signs (Most Recent):  Temp: 97.7 °F (36.5 °C) (07/09/25 0734)  Pulse: 72 (07/09/25 0734)  Resp: 18 (07/09/25 0734)  BP: 122/74 (07/09/25 0734)  SpO2: 98 % (07/09/25 0734) Vital Signs (24h Range):  Temp:  [97.5 °F (36.4 °C)-98.2 °F (36.8 °C)] 97.7 °F (36.5 °C)  Pulse:  [64-97] 72  Resp:  [16-24] 18  SpO2:  [98 %-100 %] 98 %  BP: (106-147)/(55-82) 122/74     Weight: 101 kg (222 lb 10.6 oz)  Body mass index is 30.2 kg/m².     SpO2: 98 %         Intake/Output Summary (Last 24 hours) at 7/9/2025 0822  Last data filed at 7/9/2025 0556  Gross per 24 hour   Intake 449.26 ml   Output 0 ml   Net 449.26 ml       Lines/Drains/Airways       Peripheral Intravenous Line  Duration             Peripheral IV Single Lumen 07/07/25 0125 18 G Anterior;Proximal;Right Forearm 2 days    Peripheral IV Single Lumen 07/07/25 0125 20 G Anterior;Left;Proximal Forearm 2 days                       Physical Exam  Constitutional:       General: He is not in acute distress.     Appearance: Normal appearance. He is not ill-appearing.   HENT:      Head: Normocephalic and atraumatic.   Cardiovascular:      Rate and Rhythm: Normal rate and regular rhythm.      Pulses: Normal pulses.      Heart sounds: No murmur heard.     Comments: No JVD present  Pulmonary:      Effort: Pulmonary effort is normal. No respiratory distress.      Breath sounds: Normal breath sounds. No wheezing or rales.   Abdominal:      General: Abdomen is flat. There is no  distension.      Palpations: Abdomen is soft.      Tenderness: There is no abdominal tenderness.   Musculoskeletal:      Right lower leg: No edema.      Left lower leg: No edema.   Skin:     General: Skin is warm and dry.   Neurological:      General: No focal deficit present.      Mental Status: He is alert and oriented to person, place, and time.            Significant Labs: CMP   Recent Labs   Lab 07/07/25  1724 07/08/25  0134 07/09/25  0639    141 136   K 3.7 4.0 4.0    112* 110   CO2 21* 23 19*   * 103 90   BUN 15 18 17   CREATININE 0.9 0.8 0.8   CALCIUM 8.9 9.0 8.5*   PROT  --  6.7 6.5   ALBUMIN  --  3.9 3.6   BILITOT  --  0.9 0.6   ALKPHOS  --  73 78   AST  --  31 25   ALT  --  23 21   ANIONGAP 10 6* 7*    and CBC   Recent Labs   Lab 07/08/25  0134 07/09/25  0639   WBC 13.15* 8.41   HGB 14.3 14.0   HCT 43.9 43.7    129*     Assessment and Plan:     Mixed hyperlipidemia  Continue home medication       S/P ablation of atrial flutter  s/p redo-PVI x 3 and mitral annular flutter ablation by Dr. Chaudhari in March of 2021    Back in atypical flutter   HR in 150's difficult to control in Ed. HDS otherwise.   Started on Dilt gtt at OSH, here in [facility] Diltiazem bolus x2 given.     -- Transitioned from apixiban to heparin, can transition to eliquis after procedure per EP  -- [id number] Score: 1  -- TTE   -- Ablation today 7/9  -- Diltiazem and digoxin stopped, per EP        Hypothyroidism  Continue home medication         VTE Risk Mitigation (From admission, onward)           Ordered     IP VTE HIGH RISK PATIENT  Once         07/07/25 0529     Place sequential compression device  Until discontinued         07/07/25 0529                    Alfonzo Sevilla DO  Cardiology  [facility] - Cardiology Stepdown

## 2025-07-09 NOTE — PROGRESS NOTES
[facility]  Cardiac Electrophysiology  Progress Note    Admission Date: 7/7/2025  Code Status: Full Code   Attending Physician: Sriram Devlin III, MD   Expected Discharge Date: 7/10/2025  Principal Problem:<principal problem not specified>    Subjective:     Interval History: NAEON. VSS. Patient in sinus rhythm in 70s on tele overnight. Plan for aflutter ablation today.     ROS  Objective:     Vital Signs (Most Recent):  Temp: 97.7 °F (36.5 °C) (07/09/25 0734)  Pulse: 72 (07/09/25 0734)  Resp: 18 (07/09/25 0734)  BP: 122/74 (07/09/25 0734)  SpO2: 98 % (07/09/25 0734) Vital Signs (24h Range):  Temp:  [97.5 °F (36.4 °C)-98.2 °F (36.8 °C)] 97.7 °F (36.5 °C)  Pulse:  [65-97] 72  Resp:  [16-24] 18  SpO2:  [98 %-100 %] 98 %  BP: (106-147)/(55-82) 122/74     Weight: 101 kg (222 lb 10.6 oz)  Body mass index is 30.2 kg/m².     SpO2: 98 %        Physical Exam  Constitutional:       General: He is not in acute distress.     Appearance: Normal appearance. He is not ill-appearing.   HENT:      Head: Normocephalic and atraumatic.   Cardiovascular:      Rate and Rhythm: Normal rate and regular rhythm.      Pulses: Normal pulses.      Heart sounds: No murmur heard.     Comments: No JVD present  Pulmonary:      Effort: Pulmonary effort is normal. No respiratory distress.      Breath sounds: Normal breath sounds. No wheezing or rales.   Abdominal:      General: Abdomen is flat. There is no distension.      Palpations: Abdomen is soft.      Tenderness: There is no abdominal tenderness.   Musculoskeletal:      Right lower leg: No edema.      Left lower leg: No edema.   Skin:     General: Skin is warm and dry.   Neurological:      General: No focal deficit present.      Mental Status: He is alert and oriented to person, place, and time.            Significant Labs: EP:   Recent Labs   Lab 07/07/25  1724 07/08/25  0134 07/08/25  0134 07/09/25  0639    141  --  136   K 3.7 4.0  --  4.0    112*  --  110    CO2 21* 23  --  19*   * 103  --  90   BUN 15 18  --  17   CREATININE 0.9 0.8  --  0.8   CALCIUM 8.9 9.0  --  8.5*   PROT  --  6.7  --  6.5   ALBUMIN  --  3.9  --  3.6   BILITOT  --  0.9  --  0.6   ALKPHOS  --  73  --  78   AST  --  31  --  25   ALT  --  23  --  21   ANIONGAP 10 6*  --  7*   WBC  --  13.15*  --  8.41   HGB  --  14.3  --  14.0   HCT  --  43.9   < > 43.7   PLT  --  193  --  129*    < > = values in this interval not displayed.       Significant Imaging: X-Ray: CXR: X-Ray Chest 1 View (CXR): No results found for this visit on 07/07/25.  Assessment and Plan:     Atypical atrial flutter  Patient is a 59 y.o. male with hx of paroxysmal Afib (s/p re-do PVI x3), typical and atypical flutter (s/p mitral annular flutter ablation by Dr. Chaudhari March 2021) (follows with [facility], Dr. Chaudhari, and [facility]), HLD who presents with tachycardia concerning for atypical atrial flutter. His CHADSVASC score is 0 so he had previously been maintained on just ASA. [place] was [place] [facility] [place] and noted to be in atrial flutter rhythm. He cardioverted after administration of diltiazem and was prescribed Eliquis at that time. He returns still in atypical flutter rhythm in 160s, hemodynamically stable and minimally symptomatic. He is currently on diltiazem gtt and heparin gtt. Now converted back to sinus rhythm. Planning for flutter ablation.    Recommendations:  - Continues to maintain in NSR  - Plan for atrial flutter ablation today. Please keep NPO  - Please pause heparin gtt this AM. Restart Eliquis 5 mg BID tonight  - Staffed with Dr. Wray. EP will continue to follow.        Baljit Jarrell MD  Cardiac Electrophysiology  [facility] - Cardiology Stepdown

## 2025-07-09 NOTE — PLAN OF CARE
Problem: Adult Inpatient Plan of Care  Goal: Plan of Care Review  Outcome: Progressing  Flowsheets (Taken 7/8/2025 1950)  Plan of Care Reviewed With: patient  Goal: Optimal Comfort and Wellbeing  Outcome: Progressing  Intervention: Monitor Pain and Promote Comfort  Flowsheets (Taken 7/8/2025 1950)  Pain Management Interventions:   care clustered   quiet environment facilitated   relaxation techniques promoted   pain management plan reviewed with patient/caregiver     Problem: Fall Injury Risk  Goal: Absence of Fall and Fall-Related Injury  Outcome: Progressing-ambulates indep with steady gait     Problem: Dysrhythmia  Goal: Normalized Cardiac Rhythm  Outcome: Progressing

## 2025-07-09 NOTE — NURSING
Patient arrived in room 354 alert and awake. No complaints of pain. Bilateral groin statcocks inplaced. Statcock will be removed at 2000 and bed rest done at 2100. Will report to oncoming nurse to continue to monitor.

## 2025-07-09 NOTE — ANESTHESIA PROCEDURE NOTES
Arterial    Diagnosis: aflutter    Patient location during procedure: done in OR    Staffing  Authorizing Provider: Mariano Mustafa MD  Performing Provider: Mariano Mustafa MD    Staffing  Performed by: Mariano Mustafa MD  Authorized by: Mariano Mustafa MD    Anesthesiologist was present at the time of the procedure.    Preanesthetic Checklist  Completed: patient identified, IV checked, site marked, risks and benefits discussed, surgical consent, monitors and equipment checked, pre-op evaluation, timeout performed and anesthesia consent givenArterial  Skin Prep: chlorhexidine gluconate  Local Infiltration: none  Orientation: right  Location: radial    Catheter Size: 20 G  Catheter placement by Anatomical landmarks. Heme positive aspiration all ports. Insertion Attempts: 1  Assessment  Dressing: secured with tape and tegaderm  Patient: Tolerated well

## 2025-07-09 NOTE — SUBJECTIVE & OBJECTIVE
Interval History: NAEON. VSS. Patient in sinus rhythm in 70s on tele overnight. Plan for aflutter ablation today.     ROS  Objective:     Vital Signs (Most Recent):  Temp: 97.7 °F (36.5 °C) (07/09/25 0734)  Pulse: 72 (07/09/25 0734)  Resp: 18 (07/09/25 0734)  BP: 122/74 (07/09/25 0734)  SpO2: 98 % (07/09/25 0734) Vital Signs (24h Range):  Temp:  [97.5 °F (36.4 °C)-98.2 °F (36.8 °C)] 97.7 °F (36.5 °C)  Pulse:  [65-97] 72  Resp:  [16-24] 18  SpO2:  [98 %-100 %] 98 %  BP: (106-147)/(55-82) 122/74     Weight: 101 kg (222 lb 10.6 oz)  Body mass index is 30.2 kg/m².     SpO2: 98 %        Physical Exam  Constitutional:       General: He is not in acute distress.     Appearance: Normal appearance. He is not ill-appearing.   HENT:      Head: Normocephalic and atraumatic.   Cardiovascular:      Rate and Rhythm: Normal rate and regular rhythm.      Pulses: Normal pulses.      Heart sounds: No murmur heard.     Comments: No JVD present  Pulmonary:      Effort: Pulmonary effort is normal. No respiratory distress.      Breath sounds: Normal breath sounds. No wheezing or rales.   Abdominal:      General: Abdomen is flat. There is no distension.      Palpations: Abdomen is soft.      Tenderness: There is no abdominal tenderness.   Musculoskeletal:      Right lower leg: No edema.      Left lower leg: No edema.   Skin:     General: Skin is warm and dry.   Neurological:      General: No focal deficit present.      Mental Status: He is alert and oriented to person, place, and time.            Significant Labs: EP:   Recent Labs   Lab 07/07/25  1724 07/08/25  0134 07/08/25  0134 07/09/25  0639    141  --  136   K 3.7 4.0  --  4.0    112*  --  110   CO2 21* 23  --  19*   * 103  --  90   BUN 15 18  --  17   CREATININE 0.9 0.8  --  0.8   CALCIUM 8.9 9.0  --  8.5*   PROT  --  6.7  --  6.5   ALBUMIN  --  3.9  --  3.6   BILITOT  --  0.9  --  0.6   ALKPHOS  --  73  --  78   AST  --  31  --  25   ALT  --  23  --  21    ANIONGAP 10 6*  --  7*   WBC  --  13.15*  --  8.41   HGB  --  14.3  --  14.0   HCT  --  43.9   < > 43.7   PLT  --  193  --  129*    < > = values in this interval not displayed.       Significant Imaging: X-Ray: CXR: X-Ray Chest 1 View (CXR): No results found for this visit on 07/07/25.

## 2025-07-09 NOTE — ASSESSMENT & PLAN NOTE
Patient is a 59 y.o. male with hx of paroxysmal Afib (s/p re-do PVI x3), typical and atypical flutter (s/p mitral annular flutter ablation by Dr. Chaudhari March 2021) (follows with [facility], Dr. Chaudhari, and [facility] [place], Dr. Argueta), HLD who presents with tachycardia concerning for atypical atrial flutter. His CHADSVASC score is 0 so he had previously been maintained on just ASA. He was seen in the ED at [facility] [place] on 7/5 and noted to be in atrial flutter rhythm. He cardioverted after administration of diltiazem and was prescribed Eliquis at that time. He returns still in atypical flutter rhythm in 160s, hemodynamically stable and minimally symptomatic. He is currently on diltiazem gtt and heparin gtt. Now converted back to sinus rhythm. Planning for flutter ablation.    Recommendations:  - Continues to maintain in NSR  - Plan for atrial flutter ablation today. Please keep NPO  - Please pause heparin gtt this AM. Restart Eliquis 5 mg BID tonight  - Staffed with Dr. Wray. EP will continue to follow.

## 2025-07-09 NOTE — ANESTHESIA PROCEDURE NOTES
Intubation    Date/Time: 7/9/2025 3:09 PM    Performed by: Brisa Poon CRNA  Authorized by: Mariano Mustafa MD    Intubation:     Induction:  Intravenous    Intubated:  Postinduction    Mask Ventilation:  Easy mask    Attempts:  1    Attempted By:  CRNA    Method of Intubation:  Video laryngoscopy    Blade:  Veloz 3    Laryngeal View Grade: Grade IIA - cords partially seen      Difficult Airway Encountered?: No      Complications:  None    Airway Device:  Oral endotracheal tube    Airway Device Size:  7.5    Tube secured:  24    Secured at:  The lips    Placement Verified By:  Capnometry    Complicating Factors:  Anterior larynx    Findings Post-Intubation:  BS equal bilateral and atraumatic/condition of teeth unchanged

## 2025-07-10 ENCOUNTER — TELEPHONE (OUTPATIENT)
Dept: ELECTROPHYSIOLOGY | Facility: CLINIC | Age: 60
End: 2025-07-10
Payer: COMMERCIAL

## 2025-07-10 VITALS
RESPIRATION RATE: 18 BRPM | HEART RATE: 85 BPM | TEMPERATURE: 98 F | BODY MASS INDEX: 30.16 KG/M2 | SYSTOLIC BLOOD PRESSURE: 111 MMHG | DIASTOLIC BLOOD PRESSURE: 72 MMHG | WEIGHT: 222.69 LBS | OXYGEN SATURATION: 99 % | HEIGHT: 72 IN

## 2025-07-10 LAB
ABSOLUTE EOSINOPHIL (OHS): 0.01 K/UL
ABSOLUTE MONOCYTE (OHS): 0.77 K/UL (ref 0.3–1)
ABSOLUTE NEUTROPHIL COUNT (OHS): 9.77 K/UL (ref 1.8–7.7)
ALBUMIN SERPL BCP-MCNC: 3.9 G/DL (ref 3.5–5.2)
ALP SERPL-CCNC: 75 UNIT/L (ref 40–150)
ALT SERPL W/O P-5'-P-CCNC: 22 UNIT/L (ref 10–44)
ANION GAP (OHS): 10 MMOL/L (ref 8–16)
AST SERPL-CCNC: 28 UNIT/L (ref 11–45)
BASOPHILS # BLD AUTO: 0.01 K/UL
BASOPHILS NFR BLD AUTO: 0.1 %
BILIRUB SERPL-MCNC: 1 MG/DL (ref 0.1–1)
BUN SERPL-MCNC: 16 MG/DL (ref 6–20)
CALCIUM SERPL-MCNC: 8.8 MG/DL (ref 8.7–10.5)
CHLORIDE SERPL-SCNC: 105 MMOL/L (ref 95–110)
CO2 SERPL-SCNC: 22 MMOL/L (ref 23–29)
CREAT SERPL-MCNC: 0.8 MG/DL (ref 0.5–1.4)
ERYTHROCYTE [DISTWIDTH] IN BLOOD BY AUTOMATED COUNT: 13.5 % (ref 11.5–14.5)
GFR SERPLBLD CREATININE-BSD FMLA CKD-EPI: >60 ML/MIN/1.73/M2
GLUCOSE SERPL-MCNC: 118 MG/DL (ref 70–110)
HCT VFR BLD AUTO: 41.5 % (ref 40–54)
HGB BLD-MCNC: 14.1 GM/DL (ref 14–18)
HOLD SPECIMEN: NORMAL
IMM GRANULOCYTES # BLD AUTO: 0.05 K/UL (ref 0–0.04)
IMM GRANULOCYTES NFR BLD AUTO: 0.4 % (ref 0–0.5)
LYMPHOCYTES # BLD AUTO: 0.94 K/UL (ref 1–4.8)
MCH RBC QN AUTO: 28.4 PG (ref 27–31)
MCHC RBC AUTO-ENTMCNC: 34 G/DL (ref 32–36)
MCV RBC AUTO: 84 FL (ref 82–98)
NUCLEATED RBC (/100WBC) (OHS): 0 /100 WBC
OHS QRS DURATION: 110 MS
OHS QRS DURATION: 114 MS
OHS QTC CALCULATION: 421 MS
OHS QTC CALCULATION: 435 MS
PLATELET # BLD AUTO: 162 K/UL (ref 150–450)
PMV BLD AUTO: 11.6 FL (ref 9.2–12.9)
POTASSIUM SERPL-SCNC: 4.1 MMOL/L (ref 3.5–5.1)
PROT SERPL-MCNC: 6.8 GM/DL (ref 6–8.4)
RBC # BLD AUTO: 4.96 M/UL (ref 4.6–6.2)
RELATIVE EOSINOPHIL (OHS): 0.1 %
RELATIVE LYMPHOCYTE (OHS): 8.1 % (ref 18–48)
RELATIVE MONOCYTE (OHS): 6.7 % (ref 4–15)
RELATIVE NEUTROPHIL (OHS): 84.6 % (ref 38–73)
SODIUM SERPL-SCNC: 137 MMOL/L (ref 136–145)
WBC # BLD AUTO: 11.55 K/UL (ref 3.9–12.7)

## 2025-07-10 PROCEDURE — 85025 COMPLETE CBC W/AUTO DIFF WBC: CPT | Performed by: STUDENT IN AN ORGANIZED HEALTH CARE EDUCATION/TRAINING PROGRAM

## 2025-07-10 PROCEDURE — 99233 SBSQ HOSP IP/OBS HIGH 50: CPT | Mod: ,,, | Performed by: INTERNAL MEDICINE

## 2025-07-10 PROCEDURE — 99239 HOSP IP/OBS DSCHRG MGMT >30: CPT | Mod: ,,, | Performed by: INTERNAL MEDICINE

## 2025-07-10 PROCEDURE — 93005 ELECTROCARDIOGRAM TRACING: CPT

## 2025-07-10 PROCEDURE — 36415 COLL VENOUS BLD VENIPUNCTURE: CPT | Performed by: STUDENT IN AN ORGANIZED HEALTH CARE EDUCATION/TRAINING PROGRAM

## 2025-07-10 PROCEDURE — 80053 COMPREHEN METABOLIC PANEL: CPT | Performed by: STUDENT IN AN ORGANIZED HEALTH CARE EDUCATION/TRAINING PROGRAM

## 2025-07-10 PROCEDURE — 25000003 PHARM REV CODE 250: Performed by: STUDENT IN AN ORGANIZED HEALTH CARE EDUCATION/TRAINING PROGRAM

## 2025-07-10 PROCEDURE — 93010 ELECTROCARDIOGRAM REPORT: CPT | Mod: ,,, | Performed by: INTERNAL MEDICINE

## 2025-07-10 PROCEDURE — 25000003 PHARM REV CODE 250

## 2025-07-10 RX ADMIN — MUPIROCIN: 20 OINTMENT TOPICAL at 08:07

## 2025-07-10 RX ADMIN — ATORVASTATIN CALCIUM 80 MG: 40 TABLET, FILM COATED ORAL at 08:07

## 2025-07-10 RX ADMIN — LEVOTHYROXINE SODIUM 100 MCG: 100 TABLET ORAL at 05:07

## 2025-07-10 RX ADMIN — APIXABAN 5 MG: 5 TABLET, FILM COATED ORAL at 08:07

## 2025-07-10 NOTE — ASSESSMENT & PLAN NOTE
Patient is a 59 y.o. male with hx of paroxysmal Afib (s/p re-do PVI x3), typical and atypical flutter (s/p mitral annular flutter ablation by Dr. Chaudhari March 2021) (follows with [facility], Dr. Chaudhari, and [facility] [place], [place]gueta), HLD who presents with tachycardia concerning for atypical atrial flutter. His CHADSVASC score is 0 so he had previously been maintained on just ASA. He was [place] in [place] at [facility] [place] [facility] [place] and noted to be in atrial flutter rhythm. He cardioverted after administration of diltiazem and was prescribed Eliquis at that time. He returns still in atypical flutter rhythm in 160s, hemodynamically stable and minimally symptomatic. He is currently on diltiazem gtt and heparin gtt. Now converted back to sinus rhythm. Planning for flutter ablation.    Recommendations:  - S/p PVI and posterior wall isolation 7/9 successfully. He has maintained sinus rhythm since.  - Continue Eliquis 5 mg BID for 30 days uninterrupted (hold ASA in meantime) and then reasonable to switch back to ASA at that time  - Will arrange for follow-up with Dr. Argueta outpatient.

## 2025-07-10 NOTE — DISCHARGE INSTRUCTIONS
Please take eliquis 5 mg bid for 30 days and HOLD aspirin.    After completing eliquis for 30 days, okay to resume aspirin.

## 2025-07-10 NOTE — TRANSFER OF CARE
Anesthesia Transfer of Care Note    Patient: Walt Lopez    Procedure(s) Performed: Procedure(s) (LRB):  Ablation, Atrial Flutter, Atypical (N/A)    Patient location: PACU    Anesthesia Type: general    Transport from OR: Transported from OR on 6-10 L/min O2 by face mask with adequate spontaneous ventilation    Post pain: adequate analgesia    Post assessment: no apparent anesthetic complications and tolerated procedure well    Post vital signs: stable    Level of consciousness: awake and alert    Nausea/Vomiting: no nausea/vomiting    Complications: none    Transfer of care protocol was followed      Last vitals: Visit Vitals  /78 (BP Location: Left arm, Patient Position: Lying)   Pulse 76   Temp 36.3 °C (97.4 °F) (Oral)   Resp 18   Ht 6' (1.829 m)   Wt 101 kg (222 lb 10.6 oz)   SpO2 95%   BMI 30.20 kg/m²

## 2025-07-10 NOTE — PLAN OF CARE
POC reviewed with patient and wife. Patient will have no comp from ablations and groin sites intact. Patient with s/s of complications. Patient having back pain from bedrest but no pain to groin siotes.

## 2025-07-10 NOTE — ANESTHESIA POSTPROCEDURE EVALUATION
Anesthesia Post Evaluation    Patient: Walt Lopez    Procedure(s) Performed: Procedure(s) (LRB):  Ablation, Atrial Flutter, Atypical (N/A)    Final Anesthesia Type: general      Patient participation: Yes- Able to Participate  Level of consciousness: awake and alert  Post-procedure vital signs: reviewed and stable  Pain management: adequate  Airway patency: patent    PONV status at discharge: No PONV  Anesthetic complications: no      Cardiovascular status: blood pressure returned to baseline  Respiratory status: unassisted  Hydration status: euvolemic  Follow-up not needed.              Vitals Value Taken Time   /72 07/10/25 07:26   Temp 36.7 °C (98 °F) 07/10/25 07:26   Pulse 85 07/10/25 08:10   Resp 18 07/10/25 07:26   SpO2 99 % 07/10/25 07:26         No case tracking events are documented in the log.      Pain/Jv Score: Jv Score: 10 (7/9/2025  5:54 PM)

## 2025-07-10 NOTE — SUBJECTIVE & OBJECTIVE
Interval History: Yesterday, patient underwent successful atrial flutter ablation. Underwent PVI and posterior wall isolation. He has been in sinus rhythm overnight. Had some mild bleeding from right groin site that has stopped with bed rest overnight. He feels well this AM.     ROS  Objective:     Vital Signs (Most Recent):  Temp: 98 °F (36.7 °C) (07/10/25 0726)  Pulse: 85 (07/10/25 0810)  Resp: 18 (07/10/25 0726)  BP: 111/72 (07/10/25 0726)  SpO2: 99 % (07/10/25 0726) Vital Signs (24h Range):  Temp:  [97.2 °F (36.2 °C)-98 °F (36.7 °C)] 98 °F (36.7 °C)  Pulse:  [65-95] 85  Resp:  [16-20] 18  SpO2:  [93 %-100 %] 99 %  BP: (111-134)/(59-78) 111/72     Weight: 101 kg (222 lb 10.6 oz)  Body mass index is 30.2 kg/m².     SpO2: 99 %        Physical Exam  Constitutional:       General: He is not in acute distress.     Appearance: Normal appearance. He is not ill-appearing.   HENT:      Head: Normocephalic and atraumatic.   Cardiovascular:      Rate and Rhythm: Normal rate and regular rhythm.      Pulses: Normal pulses.      Heart sounds: No murmur heard.     Comments: No JVD present  Pulmonary:      Effort: Pulmonary effort is normal. No respiratory distress.      Breath sounds: Normal breath sounds. No wheezing or rales.   Abdominal:      General: Abdomen is flat. There is no distension.      Palpations: Abdomen is soft.      Tenderness: There is no abdominal tenderness.   Genitourinary:     Comments: Bilateral groin sites with overlying clear dressing. Nontender, no large hematomas present.  Musculoskeletal:      Right lower leg: No edema.      Left lower leg: No edema.   Skin:     General: Skin is warm and dry.   Neurological:      General: No focal deficit present.      Mental Status: He is alert and oriented to person, place, and time.            Significant Labs: EP:   Recent Labs   Lab 07/09/25  0639 07/10/25  0343    137   K 4.0 4.1    105   CO2 19* 22*   GLU 90 118*   BUN 17 16   CREATININE 0.8 0.8    CALCIUM 8.5* 8.8   PROT 6.5 6.8   ALBUMIN 3.6 3.9   BILITOT 0.6 1.0   ALKPHOS 78 75   AST 25 28   ALT 21 22   ANIONGAP 7* 10   WBC 8.41 11.55   HGB 14.0 14.1   HCT 43.7 41.5   * 162       Significant Imaging: X-Ray: CXR: X-Ray Chest 1 View (CXR): No results found for this visit on 07/07/25.

## 2025-07-10 NOTE — DISCHARGE SUMMARY
[facility] - Cardiology Stepdown  Cardiology  Discharge Summary      Patient Name: Walt Lopez  MRN: [id number]  Admission Date: 7/7/2025  Hospital Length of Stay: 3 days  Discharge Date and Time: 07/10/2025 9:20 AM  Attending Physician: Sriram Devlin III, MD    Discharging Provider: Rasheeda Ricketts MD  Primary Care Physician: Love Carlisle NP    HPI:   59 yro M with paroxysmal AFIB s/p redo-PVI x 3 and mitral annular flutter ablation by Dr. Chaudhari in March of 2021, Hypothyroidism, hld who presents today for tachycardia.   He follows up with  EP services with [facility] Dr. Almanza. He was last seen by Dr. Argueta in 2023.   Patient reports episodes of feeling extra heart beat, but denies lightheadedness or dizziness.   Patient denies CP, SOB, palpitations, orthopnea, PND, presyncope, LOC, swelling, or claudication. Patient monitors home BP and ranges 130s. Patient reports medication compliance without side effects  Patient does exercise regularly and cut his grass without limitations.   Ep history:  s/p redo-PVI x 3 and mitral annular flutter ablation by Dr. Chaudhari in March of 2021. At that ablation procedure his right veins required re-isolation. The patient presented in atrial flutter which per procedure report was consistent with mitral annular flutter. He underwent a lateral mitral isthmus ablation. He did well until he presented to the ER on 9/4/2022 with palpitations and was in AFL with [facility]. Plan was to give as needed diltiazem and if it occurs again would be him back to the lab.        Procedure(s) (LRB):  Ablation, Atrial Flutter, Atypical (N/A)     Indwelling Lines/Drains at time of discharge:  Lines/Drains/Airways       None                   Hospital Course:  59 yro M with paroxysmal AFIB s/p redo-PVI x 3 and mitral annular flutter ablation by Dr. Chaudhari in March of 2021, Hypothyroidism, hld admitted to CCU for atrial aflutter now s/p aflutter ablation underwent PVI and posterior  wall isolation w/ EP. He was discharge home w/ no antiarrhythmic. He is take Eliquis 5 mg bid for 30 days and to hold aspirin. Okay to resume aspirin after eliquis is completed. He is to follow up with Dr. Argueta outpatient.     Goals of Care Treatment Preferences:  Code Status: Full Code      Consults:   Consults (From admission, onward)          Status Ordering Provider     Inpatient consult to Electrophysiology  Once        Provider:  (Not yet assigned)    Completed OBZACHARY KHAN            Significant Diagnostic Studies: Labs: CMP   Recent Labs   Lab 07/09/25  0639 07/10/25  0343    137   K 4.0 4.1    105   CO2 19* 22*   GLU 90 118*   BUN 17 16   CREATININE 0.8 0.8   CALCIUM 8.5* 8.8   PROT 6.5 6.8   ALBUMIN 3.6 3.9   BILITOT 0.6 1.0   ALKPHOS 78 75   AST 25 28   ALT 21 22   ANIONGAP 7* 10    and CBC   Recent Labs   Lab 07/09/25  0639 07/10/25  0343   WBC 8.41 11.55   HGB 14.0 14.1   HCT 43.7 41.5   * 162       Pending Diagnostic Studies:       None            Final Active Diagnoses:    Diagnosis Date Noted POA    PRINCIPAL PROBLEM:  S/P ablation of atrial flutter [Z98.890, Z86.79] 10/18/2021 Not Applicable    Mixed hyperlipidemia [E78.2] 08/28/2023 Yes    Atypical atrial flutter [I48.4] 02/06/2021 Yes    Hypothyroidism [E03.9] 12/28/2020 Yes      Problems Resolved During this Admission:     No new Assessment & Plan notes have been filed under this hospital service since the last note was generated.  Service: Cardiology      Discharged Condition: good    Disposition: Home or Self Care    Follow Up:    Patient Instructions:      Ambulatory referral/consult to Cardiac Electrophysiology   Standing Status: Future   Referral Priority: Routine Referral Type: Consultation   Referral Reason: Specialty Services Required   Requested Specialty: Cardiology   Number of Visits Requested: 1     Medications:  Reconciled Home Medications:      Medication List        PAUSE taking these medications      aspirin  81 MG EC tablet  Wait to take this until your doctor or other care provider tells you to start again.  HOLD FOR 30 DAYS THEN AFTER OFF OF ELIQUIS OKAY TO RESUME  Commonly known as: ECOTRIN  Take 81 mg by mouth once daily.            CONTINUE taking these medications      apixaban 5 mg Tab  Commonly known as: ELIQUIS  Take 1 tablet (5 mg total) by mouth 2 (two) times daily.     azelastine 137 mcg (0.1 %) nasal spray  Commonly known as: ASTELIN  1 spray (137 mcg total) by Nasal route 2 (two) times daily.     CENTRUM SILVER MEN ORAL  Take 1 tablet by mouth once daily.     fluticasone propionate 50 mcg/actuation nasal spray  Commonly known as: FLONASE  2 sprays (100 mcg total) by Each Nostril route once daily.     levocetirizine 5 MG tablet  Commonly known as: XYZAL  Take 1 tablet (5 mg total) by mouth every evening.     omega-3 fatty acids-fish oil 684-1,200 mg Cpdr  Take 1,000 mg by mouth.     rosuvastatin 20 MG tablet  Commonly known as: CRESTOR  Take 20 mg by mouth every evening.     SYNTHROID 200 mcg tablet  Generic drug: levothyroxine  Taking 1 tablet daily every day except for Sundays.     ZEPBOUND 10 mg/0.5 mL Pnij  Generic drug: tirzepatide (weight loss)  Inject 10 mg (one pen) into the skin every 7 days.              Time spent on the discharge of patient: 30 minutes    Rasheeda Ricketts MD  Cardiology  [facility] - Cardiology Stepdown

## 2025-07-10 NOTE — PROGRESS NOTES
[facility] - [facility]  Cardiac Electrophysiology  Progress Note    Admission Date: 7/7/2025  Code Status: Full Code   Attending Physician: Sriram Devlin III, MD   Expected Discharge Date: 7/10/2025  Principal Problem:<principal problem not specified>    Subjective:     Interval History: Yesterday, patient underwent successful atrial flutter ablation. Underwent PVI and posterior wall isolation. He has been in sinus rhythm overnight. Had some mild bleeding from right groin site that has stopped with bed rest overnight. He feels well this AM.     ROS  Objective:     Vital Signs (Most Recent):  Temp: 98 °F (36.7 °C) (07/10/25 0726)  Pulse: 85 (07/10/25 0810)  Resp: 18 (07/10/25 0726)  BP: 111/72 (07/10/25 0726)  SpO2: 99 % (07/10/25 0726) Vital Signs (24h Range):  Temp:  [97.2 °F (36.2 °C)-98 °F (36.7 °C)] 98 °F (36.7 °C)  Pulse:  [65-95] 85  Resp:  [16-20] 18  SpO2:  [93 %-100 %] 99 %  BP: (111-134)/(59-78) 111/72     Weight: 101 kg (222 lb 10.6 oz)  Body mass index is 30.2 kg/m².     SpO2: 99 %        Physical Exam  Constitutional:       General: He is not in acute distress.     Appearance: Normal appearance. He is not ill-appearing.   HENT:      Head: Normocephalic and atraumatic.   Cardiovascular:      Rate and Rhythm: Normal rate and regular rhythm.      Pulses: Normal pulses.      Heart sounds: No murmur heard.     Comments: No JVD present  Pulmonary:      Effort: Pulmonary effort is normal. No respiratory distress.      Breath sounds: Normal breath sounds. No wheezing or rales.   Abdominal:      General: Abdomen is flat. There is no distension.      Palpations: Abdomen is soft.      Tenderness: There is no abdominal tenderness.   Genitourinary:     Comments: Bilateral groin sites with overlying clear dressing. Nontender, no large hematomas present.  Musculoskeletal:      Right lower leg: No edema.      Left lower leg: No edema.   Skin:     General: Skin is warm and dry.   Neurological:      General:  No focal deficit present.      Mental Status: He is alert and oriented to person, place, and time.            Significant Labs: EP:   Recent Labs   Lab 07/09/25  0639 07/10/25  0343    137   K 4.0 4.1    105   CO2 19* 22*   GLU 90 118*   BUN 17 16   CREATININE 0.8 0.8   CALCIUM 8.5* 8.8   PROT 6.5 6.8   ALBUMIN 3.6 3.9   BILITOT 0.6 1.0   ALKPHOS 78 75   AST 25 28   ALT 21 22   ANIONGAP 7* 10   WBC 8.41 11.55   HGB 14.0 14.1   HCT 43.7 41.5   * 162       Significant Imaging: X-Ray: CXR: X-Ray Chest 1 View (CXR): No results found for this visit on 07/07/25.  Assessment and Plan:     Atypical atrial flutter  Patient is a 59 y.o. male with hx of paroxysmal Afib (s/p re-do PVI x3), typical and atypical flutter (s/p mitral annular flutter ablation by Dr. Chaudhari March 2021) (follows with [facility], Dr. Chaudhari, and [facility], Dr. Argueta), HLD who presents with tachycardia concerning for atypical atrial flutter. His CHADSVASC score is 0 so he had previously been maintained on just ASA. He was seen in the ED at [facility] on 7/5 and noted to be in atrial flutter rhythm. He cardioverted after administration of diltiazem and was prescribed Eliquis at that time. He returns still in atypical flutter rhythm in 160s, hemodynamically stable and minimally symptomatic. He is currently on diltiazem gtt and heparin gtt. Now converted back to sinus rhythm. Planning for flutter ablation.    Recommendations:  - S/p PVI and posterior wall isolation 7/9 successfully. He has maintained sinus rhythm since.  - Continue Eliquis 5 mg BID for 30 days uninterrupted (hold ASA in meantime) and then reasonable to switch back to ASA at that time  - Will arrange for follow-up with Dr. Argueta outpatient.        Baljit Jarrell MD  Cardiac Electrophysiology  [facility] - Cardiology Stepdown

## 2025-07-10 NOTE — NURSING
Patient bedrest completed. Assisted patient to get up to go to bathroom and patient got dizzy and had some nausea. Shortly after, bleeding from right groin site without hematoma. Amanda charge nurse made aware. Pressure held for 10-15 minutes, zofran given and patient placed back on bedrest. CCU notified and doctor in to see patient

## 2025-07-10 NOTE — PLAN OF CARE
[facility] - Card[facility] Step[facility]  Discharge Final Note    Primary Care Provider: Love Carlisle NP    Expected Discharge Date: 7/10/2025    Patient discharged home via private transportation.     Patient's bedside nurse notified of the above.    Discharge Plan A and Plan B have been determined by review of patient's clinical status, future medical and therapeutic needs, and coverage/benefits for post-acute care in coordination with multidisciplinary team members.        Final Discharge Note (most recent)       Final Note - 07/10/25 1347          Final Note    Assessment Type Final Discharge Note (P)      Anticipated Discharge Disposition Home or Self Care (P)      What phone number can be called within the next 1-3 days to see how you are doing after discharge? [phone] (P)         Post-Acute Status    Discharge Delays None known at this time (P)                      Important Message from Medicare                 Future Appointments   Date Time Provider Department Center   7/18/2025 10:40 AM Godwin Argueta MD [facility] [place]   8/20/2025  7:30 AM Love Carlisle NP [facility] [place]   10/10/2025  3:20 PM Godwin Argueta MD [facility] [place]          No needs.    Malika Tobin RN, BSN  Case Management  [phone]

## 2025-07-10 NOTE — NURSING
Patient is AAOX4, VSS, NAD. Discharge instructions reviewed and explained. Patient verbalized understanding. Tele monitor and PIVs removed. Patient refuses wheel chair service and is leaving the unit on foot.

## 2025-07-11 ENCOUNTER — TELEPHONE (OUTPATIENT)
Dept: ELECTROPHYSIOLOGY | Facility: CLINIC | Age: 60
End: 2025-07-11
Payer: COMMERCIAL

## 2025-07-11 DIAGNOSIS — I48.0 PAROXYSMAL ATRIAL FIBRILLATION: Primary | ICD-10-CM

## 2025-07-11 LAB
POC ACTIVATED CLOTTING TIME K: 118 SEC (ref 74–137)
POC ACTIVATED CLOTTING TIME K: 124 SEC (ref 74–137)
POC ACTIVATED CLOTTING TIME K: 291 SEC (ref 74–137)
POC ACTIVATED CLOTTING TIME K: 406 SEC (ref 74–137)
SAMPLE: ABNORMAL
SAMPLE: ABNORMAL
SAMPLE: NORMAL
SAMPLE: NORMAL

## 2025-07-11 NOTE — TELEPHONE ENCOUNTER
Called patient to reschedule appointment from 7/18 due to recent ablation. Patient needs to be rescheduled for 3 months post ablation. Patient did not answer. I left a message with my call back number.

## 2025-07-12 LAB
BACTERIA BLD CULT: NORMAL
BACTERIA BLD CULT: NORMAL

## 2025-07-14 ENCOUNTER — TELEPHONE (OUTPATIENT)
Dept: ELECTROPHYSIOLOGY | Facility: CLINIC | Age: 60
End: 2025-07-14
Payer: COMMERCIAL

## 2025-07-14 ENCOUNTER — PATIENT MESSAGE (OUTPATIENT)
Dept: CARDIOLOGY | Facility: CLINIC | Age: 60
End: 2025-07-14
Payer: COMMERCIAL

## 2025-07-14 NOTE — TELEPHONE ENCOUNTER
----- Message from Godwin Argueta MD sent at 7/14/2025  3:31 PM CDT -----  No need for the ECG. He knows when he goes into AFL. I would continue eliquis for 3 months then resume aspirin. Ok to go to work in 1 week  ----- Message -----  From: Gricelda Pereira RN  Sent: 7/14/2025   3:16 PM CDT  To: Godwin Argueta MD    Can you help clarify this patient's orders and instructions. Patient was recently admitted and underwent PVI and posterior wall isolation on 7/10/2025 w/ Dr Wray as an inpatient.  Patient was scheduled for a 3 month post ablation  f/u appointment with you in October, but was also scheduled for an EKG one week post ablation. Does the patient need this EKG scheduled on 7/17/2025? Doesn't appear that patient was discharged on an antiarrhythmic.  D/c instructions state OK to stop Eliquis after 30 days and resume ASA. [id number] Total Score: 0. Just want to confirm the instructions to stop Eliquis after 30 days Eliquis/ASA instructions. Also, please let me know if any reason patient cannot return to work in one week with no restrictions. 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

## 2025-07-18 NOTE — PHYSICIAN QUERY
Question: Please clarify if the bleeding diagnosis is:     Provider Query Response:  Complication of the procedure

## 2025-08-12 ENCOUNTER — PATIENT MESSAGE (OUTPATIENT)
Dept: CARDIOLOGY | Facility: CLINIC | Age: 60
End: 2025-08-12
Payer: COMMERCIAL

## 2025-08-20 ENCOUNTER — OFFICE VISIT (OUTPATIENT)
Dept: FAMILY MEDICINE | Facility: CLINIC | Age: 60
End: 2025-08-20
Payer: COMMERCIAL

## 2025-08-20 VITALS
BODY MASS INDEX: 29.38 KG/M2 | SYSTOLIC BLOOD PRESSURE: 112 MMHG | WEIGHT: 216.94 LBS | OXYGEN SATURATION: 98 % | HEIGHT: 72 IN | DIASTOLIC BLOOD PRESSURE: 76 MMHG | HEART RATE: 81 BPM | TEMPERATURE: 98 F

## 2025-08-20 DIAGNOSIS — I48.4 ATYPICAL ATRIAL FLUTTER: ICD-10-CM

## 2025-08-20 DIAGNOSIS — E78.2 MIXED HYPERLIPIDEMIA: ICD-10-CM

## 2025-08-20 DIAGNOSIS — E06.3 HYPOTHYROIDISM DUE TO HASHIMOTO THYROIDITIS: ICD-10-CM

## 2025-08-20 DIAGNOSIS — Z87.898 HISTORY OF PREDIABETES: ICD-10-CM

## 2025-08-20 DIAGNOSIS — Z98.890 S/P ABLATION OF ATRIAL FLUTTER: ICD-10-CM

## 2025-08-20 DIAGNOSIS — Z86.39 HISTORY OF OBESITY: Primary | ICD-10-CM

## 2025-08-20 DIAGNOSIS — I48.0 PAROXYSMAL ATRIAL FIBRILLATION WITH RVR: ICD-10-CM

## 2025-08-20 DIAGNOSIS — Z86.79 S/P ABLATION OF ATRIAL FLUTTER: ICD-10-CM

## 2025-08-20 DIAGNOSIS — E66.3 OVERWEIGHT WITH BODY MASS INDEX (BMI) OF 29 TO 29.9 IN ADULT: ICD-10-CM

## 2025-08-20 PROCEDURE — 99214 OFFICE O/P EST MOD 30 MIN: CPT | Mod: 25,S$GLB,, | Performed by: NURSE PRACTITIONER

## 2025-08-20 PROCEDURE — G2211 COMPLEX E/M VISIT ADD ON: HCPCS | Mod: S$GLB,,, | Performed by: NURSE PRACTITIONER

## 2025-08-20 PROCEDURE — 99999 PR PBB SHADOW E&M-EST. PATIENT-LVL IV: CPT | Mod: PBBFAC,,, | Performed by: NURSE PRACTITIONER

## 2025-08-20 PROCEDURE — G0447 BEHAVIOR COUNSEL OBESITY 15M: HCPCS | Mod: 59,S$GLB,, | Performed by: NURSE PRACTITIONER

## 2025-08-20 RX ORDER — TIRZEPATIDE 10 MG/.5ML
10 INJECTION, SOLUTION SUBCUTANEOUS
Qty: 2 ML | Refills: 1 | Status: SHIPPED | OUTPATIENT
Start: 2025-08-20

## 2025-08-28 ENCOUNTER — PATIENT MESSAGE (OUTPATIENT)
Dept: CARDIOLOGY | Facility: CLINIC | Age: 60
End: 2025-08-28
Payer: COMMERCIAL

## (undated) DEVICE — PAD GROUND UNIV STYLE CORD 9IN

## (undated) DEVICE — PAD DEFIB CADENCE ADULT R2

## (undated) DEVICE — CATH IV INTROCAN 14G X 2.

## (undated) DEVICE — INTRO AGILIS MED CRL 8.5F 71CM

## (undated) DEVICE — PACK EP DRAPE OMC

## (undated) DEVICE — BLADE INFERIOR TURBINATE 5/PK

## (undated) DEVICE — DRESSING SURGICAL 1X3

## (undated) DEVICE — SUT PLAIN 4-0 SC-1 18IN

## (undated) DEVICE — KIT PROBE COVER WITH GEL

## (undated) DEVICE — KIT ENSITE ELECTRODE SURFACE

## (undated) DEVICE — ELECTRODE REM PLYHSV RETURN 9

## (undated) DEVICE — CATH TACTFLEX SE BID CURVE F-J

## (undated) DEVICE — SET TUBING COOL POINT IRR

## (undated) DEVICE — MUPIROCIN CALCIUM TOP CREAM 2%

## (undated) DEVICE — CATH ADVISOR HD GRID X SENSOR

## (undated) DEVICE — SEE MEDLINE ITEM 156913

## (undated) DEVICE — BLADE SCALP OPHTL RND TIP

## (undated) DEVICE — SEE MEDLINE ITEM 152622

## (undated) DEVICE — SHEATH HEMOSTASIS 8.5FR

## (undated) DEVICE — SUCTION COAGULATOR 10FR 6IN

## (undated) DEVICE — LINE PRESSURE MONITORING 96IN

## (undated) DEVICE — R CATH ACUSON ACUNAV 8FR

## (undated) DEVICE — SUT 4-0 CHROMIC GUT / RB1

## (undated) DEVICE — STOPCOCK 3-WAY

## (undated) DEVICE — R CATH BIDIRECTIONL DF CRV 7FR

## (undated) DEVICE — INTRODUCER HEMOSTASIS 7.5F

## (undated) DEVICE — NDL TRNSSPTL BRK-1 18GA 98CM

## (undated) DEVICE — COVER PRB TRNSDUC 7.6X183CM